# Patient Record
Sex: MALE | Race: WHITE | Employment: OTHER | URBAN - METROPOLITAN AREA
[De-identification: names, ages, dates, MRNs, and addresses within clinical notes are randomized per-mention and may not be internally consistent; named-entity substitution may affect disease eponyms.]

---

## 2017-01-20 DIAGNOSIS — B35.3 TINEA PEDIS OF BOTH FEET: Primary | ICD-10-CM

## 2017-01-20 DIAGNOSIS — B35.6 TINEA CRURIS: ICD-10-CM

## 2017-01-20 RX ORDER — CLOTRIMAZOLE 1 %
CREAM (GRAM) TOPICAL 2 TIMES DAILY
Qty: 60 G | Refills: 0 | Status: SHIPPED | OUTPATIENT
Start: 2017-01-20 | End: 2017-02-14

## 2017-01-20 NOTE — TELEPHONE ENCOUNTER
clotrimazole (LOTRIMIN) 1 % cream   Last Written Prescription Date: 12/26/2016  Last Fill Quantity: 60g,  # refills: 0   Last Office Visit with [facility], [facility] or [facility] prescribing provider: 10/29/2016

## 2017-01-20 NOTE — TELEPHONE ENCOUNTER
Prescription approved per [facility] Refill Protocol  Briseida Palacio RN BS

## 2017-02-06 ENCOUNTER — MYC MEDICAL ADVICE (OUTPATIENT)
Dept: FAMILY MEDICINE | Facility: CLINIC | Age: 52
End: 2017-02-06

## 2017-02-06 DIAGNOSIS — E11.8 CONTROLLED TYPE 2 DIABETES MELLITUS WITH COMPLICATION, WITHOUT LONG-TERM CURRENT USE OF INSULIN (H): ICD-10-CM

## 2017-02-06 DIAGNOSIS — Z11.59 NEED FOR HEPATITIS C SCREENING TEST: Primary | ICD-10-CM

## 2017-02-06 NOTE — TELEPHONE ENCOUNTER
Dr Marcial  Please order hepatitis screening lab that you prefer  Briseida Palacio RN, BS  Clinical Nurse Triage .

## 2017-02-10 DIAGNOSIS — E11.8 CONTROLLED TYPE 2 DIABETES MELLITUS WITH COMPLICATION, WITHOUT LONG-TERM CURRENT USE OF INSULIN (H): ICD-10-CM

## 2017-02-10 DIAGNOSIS — E78.5 HYPERLIPIDEMIA LDL GOAL <100: ICD-10-CM

## 2017-02-10 DIAGNOSIS — Z11.59 NEED FOR HEPATITIS C SCREENING TEST: ICD-10-CM

## 2017-02-10 LAB
HBA1C MFR BLD: 6.2 % (ref 4.3–6)
HCV AB SERPL QL IA: NORMAL

## 2017-02-10 PROCEDURE — 80061 LIPID PANEL: CPT | Performed by: FAMILY MEDICINE

## 2017-02-10 PROCEDURE — 86803 HEPATITIS C AB TEST: CPT | Performed by: FAMILY MEDICINE

## 2017-02-10 PROCEDURE — 36415 COLL VENOUS BLD VENIPUNCTURE: CPT | Performed by: FAMILY MEDICINE

## 2017-02-10 PROCEDURE — 83036 HEMOGLOBIN GLYCOSYLATED A1C: CPT | Performed by: FAMILY MEDICINE

## 2017-02-10 PROCEDURE — 80053 COMPREHEN METABOLIC PANEL: CPT | Performed by: FAMILY MEDICINE

## 2017-02-11 LAB
ALBUMIN SERPL-MCNC: 4.4 G/DL (ref 3.4–5)
ALP SERPL-CCNC: 58 U/L (ref 40–150)
ALT SERPL W P-5'-P-CCNC: 39 U/L (ref 0–70)
ANION GAP SERPL CALCULATED.3IONS-SCNC: 7 MMOL/L (ref 3–14)
AST SERPL W P-5'-P-CCNC: 25 U/L (ref 0–45)
BILIRUB SERPL-MCNC: 0.6 MG/DL (ref 0.2–1.3)
BUN SERPL-MCNC: 17 MG/DL (ref 7–30)
CALCIUM SERPL-MCNC: 9.1 MG/DL (ref 8.5–10.1)
CHLORIDE SERPL-SCNC: 104 MMOL/L (ref 94–109)
CHOLEST SERPL-MCNC: 178 MG/DL
CO2 SERPL-SCNC: 27 MMOL/L (ref 20–32)
CREAT SERPL-MCNC: 1.1 MG/DL (ref 0.66–1.25)
GFR SERPL CREATININE-BSD FRML MDRD: 70 ML/MIN/1.7M2
GLUCOSE SERPL-MCNC: 106 MG/DL (ref 70–99)
HDLC SERPL-MCNC: 35 MG/DL
LDLC SERPL CALC-MCNC: 119 MG/DL
NONHDLC SERPL-MCNC: 143 MG/DL
POTASSIUM SERPL-SCNC: 4.1 MMOL/L (ref 3.4–5.3)
PROT SERPL-MCNC: 7.3 G/DL (ref 6.8–8.8)
SODIUM SERPL-SCNC: 138 MMOL/L (ref 133–144)
TRIGL SERPL-MCNC: 122 MG/DL

## 2017-02-13 ENCOUNTER — TRANSFERRED RECORDS (OUTPATIENT)
Dept: HEALTH INFORMATION MANAGEMENT | Facility: CLINIC | Age: 52
End: 2017-02-13

## 2017-02-14 DIAGNOSIS — B35.6 TINEA CRURIS: ICD-10-CM

## 2017-02-14 DIAGNOSIS — B35.3 TINEA PEDIS OF BOTH FEET: ICD-10-CM

## 2017-02-14 RX ORDER — CLOTRIMAZOLE 1 %
CREAM (GRAM) TOPICAL 2 TIMES DAILY
Qty: 60 G | Refills: 1 | Status: SHIPPED | OUTPATIENT
Start: 2017-02-14 | End: 2017-04-06

## 2017-02-14 NOTE — TELEPHONE ENCOUNTER
Prescription approved per [facility] Refill Protocol.  Yissel Mcdonald RN

## 2017-02-14 NOTE — TELEPHONE ENCOUNTER
clotrimazole (LOTRIMIN) 1 % cream   Last Written Prescription Date: 01/20/17  Last Fill Quantity: 60g,  # refills: 0   Last Office Visit with [facility], [facility] or [facility] prescribing provider: 10/29/16

## 2017-02-24 DIAGNOSIS — B35.6 TINEA CRURIS: ICD-10-CM

## 2017-02-24 RX ORDER — NYSTATIN AND TRIAMCINOLONE ACETONIDE 100000; 1 [USP'U]/G; MG/G
CREAM TOPICAL 2 TIMES DAILY
Qty: 60 G | Refills: 1 | Status: SHIPPED | OUTPATIENT
Start: 2017-02-24 | End: 2017-04-12

## 2017-02-24 NOTE — TELEPHONE ENCOUNTER
nystatin-triamcinolone (MYCOLOG II) cream      Last Written Prescription Date: 12/30/16  Last Fill Quantity: 60g,  # refills: 1   Last Office Visit with [facility], [facility] or [facility] prescribing provider: 10/29/2016                                               LOLA Mathew

## 2017-02-24 NOTE — TELEPHONE ENCOUNTER
Prescription approved per [facility] Refill Protocol.  Yissel Mcdonald RN

## 2017-03-06 DIAGNOSIS — E78.5 HYPERLIPIDEMIA LDL GOAL <100: ICD-10-CM

## 2017-03-07 RX ORDER — FENOFIBRATE 160 MG/1
160 TABLET ORAL DAILY
Qty: 90 TABLET | Refills: 1 | Status: SHIPPED | OUTPATIENT
Start: 2017-03-07 | End: 2017-07-21

## 2017-03-07 NOTE — TELEPHONE ENCOUNTER
Prescription [place] per [facility] [place].  Yissel Mcdonald RN

## 2017-03-07 NOTE — TELEPHONE ENCOUNTER
fenofibrate 160 MG tablet       Last Written Prescription Date: 12/30/16  Last Fill Quantity: 90, # refills: 0    Last Office Visit with [facility], [facility] or [facility] prescribing provider:  10/29/2016     Future Office Visit:      Cholesterol   Date Value Ref Range Status   02/10/2017 178 <200 mg/dL Final     HDL Cholesterol   Date Value Ref Range Status   02/10/2017 35 (L) >39 mg/dL Final     LDL Cholesterol Calculated   Date Value Ref Range Status   02/10/2017 119 (H) <100 mg/dL Final     Comment:     Above desirable:  100-129 mg/dl   Borderline High:  130-159 mg/dL   High:             160-189 mg/dL   Very high:       >189 mg/dl       LDL Cholesterol Direct   Date Value Ref Range Status   09/19/2014 77 0 - 129 mg/dL Final     Comment:     Optimal:         <100 mg/dL   Near Optimal:     100-129 mg/dL   Borderline High:  130-159 mg/dL   High:             160-189 mg/dL   Very high:  greater than or equal to 190 mg/dL   Cannot estimate LDL when triglyceride exceeds 400 mg/dL       Triglycerides   Date Value Ref Range Status   02/10/2017 122 <150 mg/dL Final     Comment:     Fasting specimen     Cholesterol/HDL Ratio   Date Value Ref Range Status   09/10/2015 4.7 0.0 - 5.0 Final     ALT   Date Value Ref Range Status   02/10/2017 39 0 - 70 U/L Final      LOLA Mathew

## 2017-04-06 DIAGNOSIS — B35.3 TINEA PEDIS OF BOTH FEET: ICD-10-CM

## 2017-04-06 DIAGNOSIS — B35.6 TINEA CRURIS: ICD-10-CM

## 2017-04-06 RX ORDER — CLOTRIMAZOLE 1 %
CREAM (GRAM) TOPICAL
Qty: 60 G | Refills: 3 | Status: SHIPPED | OUTPATIENT
Start: 2017-04-06 | End: 2017-08-17

## 2017-04-06 NOTE — TELEPHONE ENCOUNTER
Prescription approved per [facility] Refill Protocol.  Yissel Mcdonald RN

## 2017-04-06 NOTE — TELEPHONE ENCOUNTER
clotrimazole (LOTRIMIN) 1 % cream  Sig: Apply topically 2 times daily      Last Written Prescription Date: 2/14/17  Last Fill Quantity: 60g ,  # refills: 1   Last Office Visit with G, P or [facility] prescribing provider:  10/29/2016                                              Associated Diagnoses   Tinea pedis of both feet [B35.3]       Tinea cruris [B35.6]           LOLA Mathew  April 6, 2017  3:46 PM

## 2017-04-12 DIAGNOSIS — B35.6 TINEA CRURIS: ICD-10-CM

## 2017-04-13 RX ORDER — NYSTATIN AND TRIAMCINOLONE ACETONIDE 100000; 1 [USP'U]/G; MG/G
CREAM TOPICAL
Qty: 60 G | Refills: 2 | Status: SHIPPED | OUTPATIENT
Start: 2017-04-13 | End: 2020-04-04

## 2017-04-13 NOTE — TELEPHONE ENCOUNTER
Nystatin-Triamcinolone Cream      Last Written Prescription Date: 2/24/2017  Last Fill Quantity: 60g,  # refills: 1   Last Office Visit with [facility], [facility] or [facility] prescribing provider: 10/29/2016    Prescription approved per [facility] Refill Protocol.    Sarah Moreno RN, BSN, PHN

## 2017-04-21 ENCOUNTER — MYC REFILL (OUTPATIENT)
Dept: FAMILY MEDICINE | Facility: CLINIC | Age: 52
End: 2017-04-21

## 2017-04-21 DIAGNOSIS — E78.5 HYPERLIPIDEMIA LDL GOAL <100: ICD-10-CM

## 2017-04-22 RX ORDER — ATORVASTATIN CALCIUM 80 MG/1
80 TABLET, FILM COATED ORAL DAILY
Qty: 90 TABLET | Refills: 1 | Status: SHIPPED | OUTPATIENT
Start: 2017-04-22 | End: 2017-08-17

## 2017-04-22 NOTE — TELEPHONE ENCOUNTER
Message from [employer]:  Original authorizing provider: MD Edd Jay would like a refill of the following medications:  atorvastatin (LIPITOR) 80 MG tablet [Janis Marcial MD]    Preferred pharmacy: [facility] - [place] [address]    Comment:

## 2017-04-22 NOTE — TELEPHONE ENCOUNTER
Prescription approved per [facility] [place]ill Protocol. See MyChart reply to patient.   Du Branham RN

## 2017-05-25 DIAGNOSIS — K21.9 GASTROESOPHAGEAL REFLUX DISEASE WITHOUT ESOPHAGITIS: ICD-10-CM

## 2017-05-25 RX ORDER — OMEPRAZOLE 40 MG/1
CAPSULE, DELAYED RELEASE ORAL
Qty: 90 CAPSULE | Refills: 1 | Status: SHIPPED | OUTPATIENT
Start: 2017-05-25 | End: 2017-08-17

## 2017-05-25 NOTE — TELEPHONE ENCOUNTER
omeprazole (PRILOSEC) 40 MG capsule  Last Written Prescription Date: 12/30/16  Last Fill Quantity: 90,  # refills: 1   Last Office Visit with [facility], [facility] or [facility] prescribing provider:  10/29/2016                                              LOLA Mathew  May 25, 2017  8:22 AM

## 2017-05-25 NOTE — TELEPHONE ENCOUNTER
Prescription approved per [facility] Refill Protocol.  Yissel Mcdonald RN

## 2017-05-26 ENCOUNTER — TRANSFERRED RECORDS (OUTPATIENT)
Dept: HEALTH INFORMATION MANAGEMENT | Facility: CLINIC | Age: 52
End: 2017-05-26

## 2017-06-03 DIAGNOSIS — I10 ESSENTIAL HYPERTENSION, BENIGN: ICD-10-CM

## 2017-06-05 NOTE — TELEPHONE ENCOUNTER
Please disregard, RX was sent with a [facility] refill request.    LOLA Mathew  June 5, 2017  11:44 AM

## 2017-06-06 RX ORDER — METOPROLOL SUCCINATE 100 MG/1
TABLET, EXTENDED RELEASE ORAL
Qty: 90 TABLET | Refills: 1 | OUTPATIENT
Start: 2017-06-06

## 2017-06-07 DIAGNOSIS — K57.30 DIVERTICULOSIS OF LARGE INTESTINE: ICD-10-CM

## 2017-06-07 NOTE — TELEPHONE ENCOUNTER
polyethylene glycol (MIRALAX/GLYCOLAX) powder  Last Written Prescription Date: 12/30/16  Last Fill Quantity: 527g  ,  # refills: 3   Last Office Visit with [facility], [facility] or [facility] prescribing provider:  10/29/2016                                              LOLA Mathew  June 7, 2017  3:53 PM

## 2017-06-08 RX ORDER — POLYETHYLENE GLYCOL 3350 17 G/17G
POWDER, FOR SOLUTION ORAL
Qty: 1054 G | Refills: 3 | Status: SHIPPED | OUTPATIENT
Start: 2017-06-08 | End: 2018-03-05

## 2017-06-08 NOTE — TELEPHONE ENCOUNTER
Prescription approved per [facility] Refill Protocol.  Yissel Mcdonald RN

## 2017-07-21 DIAGNOSIS — E78.5 HYPERLIPIDEMIA LDL GOAL <100: ICD-10-CM

## 2017-07-21 DIAGNOSIS — E11.9 TYPE 2 DIABETES MELLITUS WITHOUT COMPLICATION (H): ICD-10-CM

## 2017-07-21 NOTE — TELEPHONE ENCOUNTER
Request is from mail pharmacy    fenofibrate 160 MG tablet       Last Written Prescription Date: 3/7/17  Last Fill Quantity: 90, # refills: 1    Last Office Visit with [facility], [facility] or [facility] prescribing provider:  10/29/2016     Future Office Visit:      Cholesterol   Date Value Ref Range Status   02/10/2017 178 <200 mg/dL Final     HDL Cholesterol   Date Value Ref Range Status   02/10/2017 35 (L) >39 mg/dL Final     LDL Cholesterol Calculated   Date Value Ref Range Status   02/10/2017 119 (H) <100 mg/dL Final     Comment:     Above desirable:  100-129 mg/dl   Borderline High:  130-159 mg/dL   High:             160-189 mg/dL   Very high:       >189 mg/dl       Triglycerides   Date Value Ref Range Status   02/10/2017 122 <150 mg/dL Final     Comment:     Fasting specimen     Cholesterol/HDL Ratio   Date Value Ref Range Status   09/10/2015 4.7 0.0 - 5.0 Final     ALT   Date Value Ref Range Status   02/10/2017 39 0 - 70 U/L Final      LOLA Mathew  July 21, 2017  11:52 AM

## 2017-07-21 NOTE — TELEPHONE ENCOUNTER
ONE TOUCH ULTRA test strip  Last Written Prescription Date: 5/16/16  Last Fill Quantity: 1 box,  # refills: 11   Last Office Visit with G, P or [facility] prescribing provider:  10/29/2016                                              LOLA Mathew  July 21, 2017  2:30 PM

## 2017-07-21 NOTE — LETTER
[facility]  [address], [address]  [place] [address]  Phone: [phone]  Fax: [phone]    07/25/17    Edd Fong  [address]  [place] [address]      Dear Kim:     We recently received a call from your pharmacy requesting a refill of your medication.    A review of your chart indicates that an appointment is required with your provider for office visit and labs. Please call the clinic at [phone] to schedule your appointment.    We have authorized one refill of your medication to allow time for you to schedule your appointment.    Taking care of your health is important to us, and ongoing visits with your provider are vital to your care.  We look forward to seeing you in the near future.          Sincerely,      Janis Marcial MD/Briseida VILLATORO RN

## 2017-07-25 ENCOUNTER — MYC REFILL (OUTPATIENT)
Dept: FAMILY MEDICINE | Facility: CLINIC | Age: 52
End: 2017-07-25

## 2017-07-25 DIAGNOSIS — E78.5 HYPERLIPIDEMIA LDL GOAL <100: ICD-10-CM

## 2017-07-25 DIAGNOSIS — I10 ESSENTIAL HYPERTENSION, BENIGN: ICD-10-CM

## 2017-07-25 RX ORDER — FENOFIBRATE 160 MG/1
160 TABLET ORAL DAILY
Qty: 90 TABLET | Refills: 1 | Status: CANCELLED | OUTPATIENT
Start: 2017-07-25

## 2017-07-25 RX ORDER — METOPROLOL SUCCINATE 100 MG/1
100 TABLET, EXTENDED RELEASE ORAL DAILY
Qty: 90 TABLET | Refills: 1 | Status: CANCELLED | OUTPATIENT
Start: 2017-07-25

## 2017-07-25 RX ORDER — FENOFIBRATE 160 MG/1
160 TABLET ORAL DAILY
Qty: 90 TABLET | Refills: 0 | Status: SHIPPED | OUTPATIENT
Start: 2017-07-25 | End: 2017-08-17

## 2017-07-25 NOTE — TELEPHONE ENCOUNTER
Prescription approved per [facility] Refill Protocol  Briseida Palacio RN BS

## 2017-07-25 NOTE — TELEPHONE ENCOUNTER
Message from [employer]:  Original authorizing provider: MD Edd Jay would like a refill of the following medications:  fenofibrate 160 MG tablet [Janis Marcial MD]  metoprolol (TOPROL-XL) 100 MG 24 hr tablet [Janis Marcial MD]    Preferred pharmacy: [facility] - [facility] [address]    Comment:

## 2017-07-25 NOTE — TELEPHONE ENCOUNTER
Medication is being filled for 1 time refill only due to:  due for labsw and visit, letter sent to make appt     Briseida Palacio RN, BS  Clinical Nurse Triage.

## 2017-08-02 ENCOUNTER — DOCUMENTATION ONLY (OUTPATIENT)
Dept: LAB | Facility: CLINIC | Age: 52
End: 2017-08-02

## 2017-08-02 DIAGNOSIS — E11.8 CONTROLLED TYPE 2 DIABETES MELLITUS WITH COMPLICATION, WITHOUT LONG-TERM CURRENT USE OF INSULIN (H): ICD-10-CM

## 2017-08-02 DIAGNOSIS — E11.8 CONTROLLED TYPE 2 DIABETES MELLITUS WITH COMPLICATION, WITHOUT LONG-TERM CURRENT USE OF INSULIN (H): Primary | ICD-10-CM

## 2017-08-02 PROCEDURE — 80061 LIPID PANEL: CPT | Performed by: FAMILY MEDICINE

## 2017-08-02 PROCEDURE — 83036 HEMOGLOBIN GLYCOSYLATED A1C: CPT | Performed by: FAMILY MEDICINE

## 2017-08-02 PROCEDURE — 36415 COLL VENOUS BLD VENIPUNCTURE: CPT | Performed by: FAMILY MEDICINE

## 2017-08-03 LAB
CHOLEST SERPL-MCNC: 178 MG/DL
HBA1C MFR BLD: 6.3 % (ref 4.3–6)
HDLC SERPL-MCNC: 33 MG/DL
LDLC SERPL CALC-MCNC: 105 MG/DL
NONHDLC SERPL-MCNC: 145 MG/DL
TRIGL SERPL-MCNC: 199 MG/DL

## 2017-08-05 ENCOUNTER — MYC MEDICAL ADVICE (OUTPATIENT)
Dept: FAMILY MEDICINE | Facility: CLINIC | Age: 52
End: 2017-08-05

## 2017-08-17 ENCOUNTER — OFFICE VISIT (OUTPATIENT)
Dept: FAMILY MEDICINE | Facility: CLINIC | Age: 52
End: 2017-08-17
Payer: COMMERCIAL

## 2017-08-17 VITALS
OXYGEN SATURATION: 98 % | BODY MASS INDEX: 28.85 KG/M2 | HEART RATE: 52 BPM | DIASTOLIC BLOOD PRESSURE: 86 MMHG | HEIGHT: 76 IN | RESPIRATION RATE: 16 BRPM | TEMPERATURE: 98 F | WEIGHT: 236.9 LBS | SYSTOLIC BLOOD PRESSURE: 156 MMHG

## 2017-08-17 DIAGNOSIS — E78.5 HYPERLIPIDEMIA LDL GOAL <100: ICD-10-CM

## 2017-08-17 DIAGNOSIS — Z00.00 ROUTINE GENERAL MEDICAL EXAMINATION AT A HEALTH CARE FACILITY: Primary | ICD-10-CM

## 2017-08-17 DIAGNOSIS — Z13.89 SCREENING FOR DIABETIC PERIPHERAL NEUROPATHY: ICD-10-CM

## 2017-08-17 DIAGNOSIS — I10 ESSENTIAL HYPERTENSION, BENIGN: ICD-10-CM

## 2017-08-17 DIAGNOSIS — E11.8 CONTROLLED TYPE 2 DIABETES MELLITUS WITH COMPLICATION, WITHOUT LONG-TERM CURRENT USE OF INSULIN (H): ICD-10-CM

## 2017-08-17 DIAGNOSIS — K21.9 GASTROESOPHAGEAL REFLUX DISEASE WITHOUT ESOPHAGITIS: ICD-10-CM

## 2017-08-17 DIAGNOSIS — F33.42 MAJOR DEPRESSIVE DISORDER, RECURRENT EPISODE, IN FULL REMISSION (H): ICD-10-CM

## 2017-08-17 DIAGNOSIS — B35.3 TINEA PEDIS OF BOTH FEET: ICD-10-CM

## 2017-08-17 LAB
ABO + RH BLD: NORMAL
ABO + RH BLD: NORMAL
ERYTHROCYTE [DISTWIDTH] IN BLOOD BY AUTOMATED COUNT: 12.7 % (ref 10–15)
HCT VFR BLD AUTO: 39 % (ref 40–53)
HGB BLD-MCNC: 13.1 G/DL (ref 13.3–17.7)
MCH RBC QN AUTO: 30.7 PG (ref 26.5–33)
MCHC RBC AUTO-ENTMCNC: 33.6 G/DL (ref 31.5–36.5)
MCV RBC AUTO: 91 FL (ref 78–100)
PLATELET # BLD AUTO: 198 10E9/L (ref 150–450)
RBC # BLD AUTO: 4.27 10E12/L (ref 4.4–5.9)
SPECIMEN EXP DATE BLD: NORMAL
WBC # BLD AUTO: 4.6 10E9/L (ref 4–11)

## 2017-08-17 PROCEDURE — 36415 COLL VENOUS BLD VENIPUNCTURE: CPT | Performed by: FAMILY MEDICINE

## 2017-08-17 PROCEDURE — 86901 BLOOD TYPING SEROLOGIC RH(D): CPT | Performed by: FAMILY MEDICINE

## 2017-08-17 PROCEDURE — 99396 PREV VISIT EST AGE 40-64: CPT | Performed by: FAMILY MEDICINE

## 2017-08-17 PROCEDURE — 84443 ASSAY THYROID STIM HORMONE: CPT | Performed by: FAMILY MEDICINE

## 2017-08-17 PROCEDURE — 86900 BLOOD TYPING SEROLOGIC ABO: CPT | Performed by: FAMILY MEDICINE

## 2017-08-17 PROCEDURE — 82043 UR ALBUMIN QUANTITATIVE: CPT | Performed by: FAMILY MEDICINE

## 2017-08-17 PROCEDURE — 99213 OFFICE O/P EST LOW 20 MIN: CPT | Mod: 25 | Performed by: FAMILY MEDICINE

## 2017-08-17 PROCEDURE — 99207 C FOOT EXAM  NO CHARGE: CPT | Mod: 25 | Performed by: FAMILY MEDICINE

## 2017-08-17 PROCEDURE — 80053 COMPREHEN METABOLIC PANEL: CPT | Performed by: FAMILY MEDICINE

## 2017-08-17 PROCEDURE — 85027 COMPLETE CBC AUTOMATED: CPT | Performed by: FAMILY MEDICINE

## 2017-08-17 RX ORDER — PAROXETINE 20 MG/1
20 TABLET, FILM COATED ORAL AT BEDTIME
Qty: 90 TABLET | Refills: 1 | Status: SHIPPED | OUTPATIENT
Start: 2017-08-17 | End: 2018-03-05

## 2017-08-17 RX ORDER — CLOTRIMAZOLE 1 %
CREAM (GRAM) TOPICAL
Qty: 60 G | Refills: 3 | Status: SHIPPED | OUTPATIENT
Start: 2017-08-17 | End: 2018-04-28

## 2017-08-17 RX ORDER — OMEPRAZOLE 40 MG/1
CAPSULE, DELAYED RELEASE ORAL
Qty: 90 CAPSULE | Refills: 1 | Status: SHIPPED | OUTPATIENT
Start: 2017-08-17 | End: 2018-07-17

## 2017-08-17 RX ORDER — METOPROLOL SUCCINATE 100 MG/1
100 TABLET, EXTENDED RELEASE ORAL DAILY
Qty: 90 TABLET | Refills: 1 | Status: SHIPPED | OUTPATIENT
Start: 2017-08-17 | End: 2018-03-05

## 2017-08-17 RX ORDER — LISINOPRIL 5 MG/1
5 TABLET ORAL DAILY
Qty: 90 TABLET | Refills: 1 | Status: SHIPPED | OUTPATIENT
Start: 2017-08-17 | End: 2017-12-11 | Stop reason: DRUGHIGH

## 2017-08-17 RX ORDER — FENOFIBRATE 160 MG/1
160 TABLET ORAL DAILY
Qty: 90 TABLET | Refills: 1 | Status: SHIPPED | OUTPATIENT
Start: 2017-08-17 | End: 2018-03-05

## 2017-08-17 RX ORDER — ATORVASTATIN CALCIUM 80 MG/1
80 TABLET, FILM COATED ORAL DAILY
Qty: 90 TABLET | Refills: 1 | Status: SHIPPED | OUTPATIENT
Start: 2017-08-17 | End: 2018-07-18

## 2017-08-17 ASSESSMENT — ANXIETY QUESTIONNAIRES
7. FEELING AFRAID AS IF SOMETHING AWFUL MIGHT HAPPEN: NOT AT ALL
2. NOT BEING ABLE TO STOP OR CONTROL WORRYING: NOT AT ALL
5. BEING SO RESTLESS THAT IT IS HARD TO SIT STILL: NOT AT ALL
GAD7 TOTAL SCORE: 0
3. WORRYING TOO MUCH ABOUT DIFFERENT THINGS: NOT AT ALL
6. BECOMING EASILY ANNOYED OR IRRITABLE: NOT AT ALL
1. FEELING NERVOUS, ANXIOUS, OR ON EDGE: NOT AT ALL

## 2017-08-17 ASSESSMENT — PAIN SCALES - GENERAL: PAINLEVEL: NO PAIN (0)

## 2017-08-17 ASSESSMENT — PATIENT HEALTH QUESTIONNAIRE - PHQ9
5. POOR APPETITE OR OVEREATING: NOT AT ALL
SUM OF ALL RESPONSES TO PHQ QUESTIONS 1-9: 0

## 2017-08-17 NOTE — PATIENT INSTRUCTIONS
Preventive Health Recommendations  Male Ages 50   64    Yearly exam:             See your health care provider every year in order to  o   Review health changes.   o   Discuss preventive care.    o   Review your medicines if your doctor has prescribed any.     Have a cholesterol test every 5 years, or more frequently if you are at risk for high cholesterol/heart disease.     Have a diabetes test (fasting glucose) every three years. If you are at risk for diabetes, you should have this test more often.     Have a colonoscopy at age 50, or have a yearly FIT test (stool test). These exams will check for colon cancer.      Talk with your health care provider about whether or not a prostate cancer screening test (PSA) is right for you.    You should be tested each year for STDs (sexually transmitted diseases), if you re at risk.     Shots: Get a flu shot each year. Get a tetanus shot every 10 years.     Nutrition:    Eat at least 5 servings of fruits and vegetables daily.     Eat whole-grain bread, whole-wheat pasta and brown rice instead of white grains and rice.     Talk to your provider about Calcium and Vitamin D.     Lifestyle    Exercise for at least 150 minutes a week (30 minutes a day, 5 days a week). This will help you control your weight and prevent disease.     Limit alcohol to one drink per day.     No smoking.     Wear sunscreen to prevent skin cancer.     See your dentist every six months for an exam and cleaning.     See your eye doctor every 1 to 2 years.      A handful or two of raw unsalted almonds, walnuts, etc.    Eating plant fats such as avocados  Fish once a week  3 servings of fruits a day  2 servings of vegetables a day  More beans such as garbanzo beans   Less meat and dairy- recommend skim milk. A low sugar yogurt a day is fine

## 2017-08-17 NOTE — PROGRESS NOTES
SUBJECTIVE:   CC: Edd Fong is an 52 year old male who presents for preventative health visit.     Physical   Annual:     Getting at least 3 servings of Calcium per day::  Yes    Bi-annual eye exam::  Yes    Dental care twice a year::  Yes    Sleep apnea or symptoms of sleep apnea::  None    Diet::  Low salt, Low fat/cholesterol and Diabetic    Frequency of exercise::  4-5 days/week    Duration of exercise::  45-60 minutes    Taking medications regularly::  Yes    Medication side effects::  None    Additional concerns today::  No    Diabetes Follow-up    Patient is checking blood sugars: once daily.  Results are as follows:       am - 120        Diabetic concerns: None     Symptoms of hypoglycemia (low blood sugar): none     Paresthesias (numbness or burning in feet) or sores: No     Date of last diabetic eye exam: 5/26/2017, rosemount and normal    Patient's recent A1c was 6.3. He checks his blood glucose levels at home and states that they have been fine. His blood glucose levels this morning while fasting was 120 mg/dL. He had a diabetic eye exam 5/26/2017.      Hypertension Follow-up      [facility].  Results are 122/62.    Low Salt Diet: no added salt    Patient [facility] [place]. Readings average 122/62. He states that he hardly ever gets any high readings.     He has been exercising 3-4 times a week [facility]. He has been going here for the past 4 years. He will normally do 30 minutes on the elliptical, 15 minutes on the treadmill and will also do weight training and free weights. No chest pains, no concerns    Patient reports that the insomnia, constipation and energy are fine and controlled.     He thought he felt something pull in the lower right back region. Pain has gone away and he is not currently affected by it.      Today's PHQ-2 Score: PHQ-2 ( 1999 Pfizer) 8/12/2017   Q1:  Little interest or pleasure in doing things 0   Q2: Feeling down, depressed or hopeless 0   PHQ-2 Score 0   Q1: Little interest or pleasure in doing things Not at all   Q2: Feeling down, depressed or hopeless Not at all   PHQ-2 Score 0     Abuse: Current or Past(Physical, Sexual or Emotional)- NO  Do you feel safe in your environment - YES    Social History   Substance Use Topics     Smoking status: Never Smoker     Smokeless tobacco: Never Used     Alcohol use 0.0 oz/week      Comment: 1 beer a day     The patient does not drink >3 drinks per day nor >7 drinks per week.    Last PSA:   PSA   Date Value Ref Range Status   02/08/2011 0.38 0 - 4 ug/L Final     Reviewed orders with patient. Reviewed health maintenance and updated orders accordingly - Yes  Labs reviewed in EPIC  BP Readings from Last 3 Encounters:   08/17/17 150/80   10/29/16 138/72   08/29/16 126/72    Wt Readings from Last 3 Encounters:   08/17/17 107.5 kg (236 lb 14.4 oz)   10/29/16 108.9 kg (240 lb)   08/29/16 109.5 kg (241 lb 8 oz)          Recent Labs   Lab Test  08/02/17   0830  02/10/17   0839  08/17/16   0849  03/15/16   0931   09/28/15   1157   02/13/15   1053   A1C  6.3*  6.2*  7.2*   --    < >   --    < >  7.0*   LDL  105*  119*  117*  109*   --    --    < >   --    HDL  33*  35*  32*  33*   --    --    < >   --    TRIG  199*  122  147  195*   --    --    < >   --    ALT   --   39   --   34   --   37   --    --    CR   --   1.10   --   1.17   --   1.20   --   1.11   GFRESTIMATED   --   70   --   65   --   64   --   70   GFRESTBLACK   --   85   --   79   --   77   --   84   POTASSIUM   --   4.1   --   4.4   --   4.2   --   4.2   TSH   --    --    --   2.99   --    --    --   2.42    < > = values in this interval not displayed.      Reviewed and updated as needed this visit by clinical staffTobacco  Allergies  Meds  Problems  Med Hx  Surg Hx  Fam Hx  Soc Hx          Reviewed and updated as needed this visit by Provider       "      ROS:  C: NEGATIVE for fever, chills, change in weight  I: NEGATIVE for worrisome rashes, moles or lesions  E: NEGATIVE for vision changes or irritation  ENT: NEGATIVE for ear, mouth and throat problems  R: NEGATIVE for significant cough or SOB  CV: NEGATIVE for chest pain, palpitations or peripheral edema  GI: NEGATIVE for nausea, abdominal pain, heartburn, or change in bowel habits   male: negative for dysuria, hematuria, decreased urinary stream, erectile dysfunction, urethral discharge  M: NEGATIVE for significant arthralgias or myalgia  N: NEGATIVE for weakness, dizziness or paresthesias  P: NEGATIVE for changes in mood or affect    This document serves as a record of the services and decisions personally performed and made by Jansi Marcial MD. It was created on her behalf by Mireya Banks, a trained medical scribe. The creation of this document is based on the provider's statements to the medical scribe.  Mireya Banks August 17, 2017 10:01 AM       OBJECTIVE:   /80 (BP Location: Right arm, Patient Position: Chair, Cuff Size: Adult Regular)  Pulse 52  Temp 98  F (36.7  C) (Oral)  Resp 16  Ht 6' 4\" (1.93 m)  Wt 236 lb 14.4 oz (107.5 kg)  SpO2 98%  BMI 28.84 kg/m2    EXAM:  GENERAL: healthy, alert and no distress  EYES: Eyes grossly normal to inspection, PERRL and conjunctivae and sclerae normal  HENT: ear canals and TM's normal, nose and mouth without ulcers or lesions  NECK: no adenopathy, no asymmetry, masses, or scars and thyroid normal to palpation  RESP: lungs clear to auscultation - no rales, rhonchi or wheezes  CV: regular rate and rhythm, normal S1 S2, no S3 or S4, no murmur, click or rub, no peripheral edema and peripheral pulses strong  ABDOMEN: soft, nontender, no hepatosplenomegaly, no masses and bowel sounds normal  MS: no gross musculoskeletal defects noted, no edema  SKIN: no suspicious lesions or rashes. Slight athletes foot noted on the bottoms and sides of both feet, " mild   NEURO: Normal strength and tone, mentation intact and speech normal  PSYCH: mentation appears normal, affect normal/bright  Diabetic foot exam: normal DP and PT pulses, no trophic changes or ulcerative lesions and normal sensory examnormal DP and PT pulses, no trophic changes or ulcerative lesions and normal sensory exam    ASSESSMENT/PLAN:   (Z00.00) Routine general medical examination at a health care facility  (primary encounter diagnosis)  Comment: Patient is overall doing well. Normal exam, other then overweight-but stable    (E11.8) Controlled type 2 diabetes mellitus with complication, without long-term current use of insulin (H)  Comment: Patient's last A1c was within normal limits. Will preform labs today. I recommended improving diet and wrote down the recommendations for the patient to take home. Will recheck A1c in 6 months.   Plan: Albumin Random Urine Quantitative, TSH with         free T4 reflex, CBC with platelets, blood         glucose monitoring (ONE TOUCH ULTRA) test         strip, metFORMIN (GLUCOPHAGE) 500 MG tablet,         blood glucose monitoring (ONE TOUCH ULTRA) test        strip, DISCONTINUED: blood glucose monitoring         (ONE TOUCH ULTRA) test strip, CANCELED:         Comprehensive metabolic panel          (Z13.89) Screening for diabetic peripheral neuropathy  Comment: Normal.   Plan: FOOT EXAM  NO CHARGE [63584.114]          (E78.5) Hyperlipidemia LDL goal <100  Comment: LDL slightly above normal limits. I recommended improving diet and decreasing meat and dairy consumption. Continue current meds. No side effects to meds  Plan: fenofibrate 160 MG tablet, atorvastatin         (LIPITOR) 80 MG tablet          (I10) Essential hypertension, benign  Comment: Patient's blood pressure was slightly above normal limits today. Continue current meds. Will recheck blood pressure in 6 months.  Recheck was normal  Plan: metoprolol (TOPROL-XL) 100 MG 24 hr tablet,         lisinopril  "(PRINIVIL/ZESTRIL) 5 MG tablet          (K21.9) Gastroesophageal reflux disease without esophagitis  Comment: Controlled. Continue current meds.   Plan: omeprazole (PRILOSEC) 40 MG capsule          (B35.3) Tinea pedis of both feet  Comment: I advised him to make sure he uses the cream daily and that he spread the cream all over the foot and especially the back of the heel.   Plan: clotrimazole (LOTRIMIN) 1 % cream             (F33.42) Major depressive disorder, recurrent episode, in full remission (H)  Comment: Stable. Continue current meds.   Plan: PARoxetine (PAXIL) 20 MG tablet          COUNSELING:   Reviewed preventive health counseling, as reflected in patient instructions       Regular exercise       Healthy diet/nutrition     reports that he has never smoked. He has never used smokeless tobacco.  Estimated body mass index is 28.84 kg/(m^2) as calculated from the following:    Height as of this encounter: 6' 4\" (1.93 m).    Weight as of this encounter: 236 lb 14.4 oz (107.5 kg).   Weight management plan: Discussed healthy diet and exercise guidelines and patient will follow up in 12 months in clinic to re-evaluate.    Counseling Resources:  ATP IV Guidelines  Pooled Cohorts Equation Calculator  FRAX Risk Assessment  ICSI Preventive Guidelines  Dietary Guidelines for Americans, 2010  USDA's MyPlate  ASA Prophylaxis  Lung CA Screening    The information in this document, created by the medical scribe for me, accurately reflects the services I personally performed and the decisions made by me. I have reviewed and approved this document for accuracy prior to leaving the patient care area.  August 17, 2017 10:01 AM    Janis Marcial MD  [facility]  "

## 2017-08-17 NOTE — PROGRESS NOTES
"  SUBJECTIVE:   CC: Edd Fong is an 52 year old male who presents for preventative health visit.     Healthy Habits:    Do you get at least three servings of calcium containing foods daily (dairy, green leafy vegetables, etc.)? {YES/NO, DAIRY INTAKE:359413::\"yes\"}    Amount of exercise or daily activities, outside of work: {AMOUNT EXERCISE:119279}    Problems taking medications regularly {Yes /No default:344698::\"No\"}    Medication side effects: {Yes /No default.:494600::\"No\"}    Have you had an eye exam in the past two years? {YESNOBLANK:450761}    Do you see a dentist twice per year? {YESNOBLANK:907806}    Do you have sleep apnea, excessive snoring or daytime drowsiness?{YESNOBLANK:445556}    {Outside tests to abstract? :877142}    {additional problems to add (Optional):294180}    Today's PHQ-2 Score:   PHQ-2 ( 1999 Pfizer) 8/12/2017 8/29/2016   Q1: Little interest or pleasure in doing things 0 0   Q2: Feeling down, depressed or hopeless 0 0   PHQ-2 Score 0 0   Q1: Little interest or pleasure in doing things Not at all -   Q2: Feeling down, depressed or hopeless Not at all -   PHQ-2 Score 0 -     {PHQ-2 LOOK IN ASSESSMENTS :179567}  Abuse: Current or Past(Physical, Sexual or Emotional)- {YES/NO/NA:092282}  Do you feel safe in your environment - {YES/NO/NA:971407}    Social History   Substance Use Topics     Smoking status: Never Smoker     Smokeless tobacco: Never Used     Alcohol use 0.0 oz/week      Comment: 1 beer a day     {ETOH AUDIT:257008}    Last PSA:   PSA   Date Value Ref Range Status   02/08/2011 0.38 0 - 4 ug/L Final       Reviewed orders with patient. Reviewed health maintenance and updated orders accordingly - {Yes/No:406640::\"Yes\"}  {Chronicprobdata (Optional):668102}    Reviewed and updated as needed this visit by clinical staff  Tobacco  Allergies  Meds  Problems  Med Hx  Surg Hx  Fam Hx  Soc Hx          Reviewed and updated as needed this visit by Provider        {HISTORY OPTIONS " "(Optional):411413}    ROS:  {MALE ROS-adult preventive care package:270907::\"C: NEGATIVE for fever, chills, change in weight\",\"I: NEGATIVE for worrisome rashes, moles or lesions\",\"E: NEGATIVE for vision changes or irritation\",\"ENT: NEGATIVE for ear, mouth and throat problems\",\"R: NEGATIVE for significant cough or SOB\",\"CV: NEGATIVE for chest pain, palpitations or peripheral edema\",\"GI: NEGATIVE for nausea, abdominal pain, heartburn, or change in bowel habits\",\" male: negative for dysuria, hematuria, decreased urinary stream, erectile dysfunction, urethral discharge\",\"M: NEGATIVE for significant arthralgias or myalgia\",\"N: NEGATIVE for weakness, dizziness or paresthesias\",\"P: NEGATIVE for changes in mood or affect\"}    OBJECTIVE:   There were no vitals taken for this visit.  EXAM:  {Exam Choices:206728}    ASSESSMENT/PLAN:   {Diag Picklist:511263}    COUNSELING:  {MALE COUNSELING MESSAGES:597664::\"Reviewed preventive health counseling, as reflected in patient instructions\"}    {BP Counseling- Complete if BP >= 120/80  (Optional):201218}     reports that he has never smoked. He has never used smokeless tobacco.  {Tobacco Cessation -- Complete if patient is a smoker (Optional):257387}  Estimated body mass index is 29.6 kg/(m^2) as calculated from the following:    Height as of 10/29/16: 6' 3.5\" (1.918 m).    Weight as of 10/29/16: 240 lb (108.9 kg).   {Weight Management Plan (ACO) Complete if BMI is abnormal-  Ages 18-64  BMI >24.9.  Age 65+ with BMI <23 or >30 (Optional):083064}    Counseling Resources:  ATP IV Guidelines  Pooled Cohorts Equation Calculator  FRAX Risk Assessment  ICSI Preventive Guidelines  Dietary Guidelines for Americans, 2010  USDA's MyPlate  ASA Prophylaxis  Lung CA Screening    Janis Marcial MD  [facility]  "

## 2017-08-17 NOTE — MR AVS SNAPSHOT
After Visit Summary   8/17/2017    Edd Fong    MRN: [id number]           Patient Information     Date Of Birth          1965        Visit Information        Provider Department      8/17/2017 9:40 AM Janis Marcial MD [facility]        Today's Diagnoses     Routine general medical examination at a health care facility    -  1    Controlled type 2 diabetes mellitus with complication, without long-term current use of insulin (H)        Screening for diabetic peripheral neuropathy        Hyperlipidemia LDL goal <100        Essential hypertension, benign        Gastroesophageal reflux disease without esophagitis        Tinea pedis of both feet        Tinea cruris        Major depressive disorder, recurrent episode, in full remission (H)          Care Instructions      Preventive Health Recommendations  Male Ages 50 - 64    Yearly exam:             See your health care provider every year in order to  o   Review health changes.   o   Discuss preventive care.    o   Review your medicines if your doctor has prescribed any.     Have a cholesterol test every 5 years, or more frequently if you are at risk for high cholesterol/heart disease.     Have a diabetes test (fasting glucose) every three years. If you are at risk for diabetes, you should have this test more often.     Have a colonoscopy at age 50, or have a yearly FIT test (stool test). These exams will check for colon cancer.      Talk with your health care provider about whether or not a prostate cancer screening test (PSA) is right for you.    You should be tested each year for STDs (sexually transmitted diseases), if you re at risk.     Shots: Get a flu shot each year. Get a tetanus shot every 10 years.     Nutrition:    Eat at least 5 servings of fruits and vegetables daily.     Eat whole-grain bread, whole-wheat pasta and brown rice instead of white grains and rice.     Talk to your provider about Calcium and  Vitamin D.     Lifestyle    Exercise for at least 150 minutes a week (30 minutes a day, 5 days a week). This will help you control your weight and prevent disease.     Limit alcohol to one drink per day.     No smoking.     Wear sunscreen to prevent skin cancer.     See your dentist every six months for an exam and cleaning.     See your eye doctor every 1 to 2 years.      A handful or two of raw unsalted almonds, walnuts, etc.    Eating plant fats such as avocados  Fish once a week  3 servings of fruits a day  2 servings of vegetables a day  More beans such as garbanzo beans   Less meat and dairy- recommend skim milk. A low sugar yogurt a day is fine          Follow-ups after your visit        Follow-up notes from your care team     Return in about 6 months (around 2/17/2018).      Who to contact     If you have questions or need follow up information about today's clinic visit or your schedule please contact [facility] directly at [phone].  Normal or non-critical lab and imaging results will be communicated to you by Spokehart, letter or phone within 4 business days after the clinic has received the results. If you do not hear from us within 7 days, please contact the clinic through Bensata or phone. If you have a critical or abnormal lab result, we will notify you by phone as soon as possible.  Submit refill requests through Bensata or call your pharmacy and they will forward the refill request to us. Please allow 3 business days for your refill to be completed.          Additional Information About Your Visit        Bensata Information     Bensata gives you secure access to your electronic health record. If you see a primary care provider, you can also send messages to your care team and make appointments. If you have questions, please call your primary care clinic.  If you do not have a primary care provider, please call [phone] and they will assist you.        Care EveryWhere ID      "This is your Care EveryWhere ID. This could be used by other organizations to access your [place] medical records  [id number]        Your Vitals Were     Pulse Temperature Respirations Height Pulse Oximetry BMI (Body Mass Index)    52 98  F (36.7  C) (Oral) 16 6' 4\" (1.93 m) 98% 28.84 kg/m2       Blood Pressure from Last 3 Encounters:   08/17/17 150/80   10/29/16 138/72   08/29/16 126/72    Weight from Last 3 Encounters:   08/17/17 236 lb 14.4 oz (107.5 kg)   10/29/16 240 lb (108.9 kg)   08/29/16 241 lb 8 oz (109.5 kg)              We Performed the Following     **Comprehensive metabolic panel FUTURE 1yr     ABO and Rh     Albumin Random Urine Quantitative     CBC with platelets     Comprehensive metabolic panel     FOOT EXAM  NO CHARGE [[id number]]     TSH with free T4 reflex          Today's Medication Changes          These changes are accurate as of: 8/17/17 10:27 AM.  If you have any questions, ask your nurse or doctor.               These medicines have changed or have updated prescriptions.        Dose/Directions    * blood glucose monitoring test strip   Commonly known as:  ONE TOUCH ULTRA   This may have changed:  See the new instructions.   Used for:  Controlled type 2 diabetes mellitus with complication, without long-term current use of insulin (H)   Changed by:  Janis Marcial MD        USE TO TEST BLOOD SUGARS ONCE DAILY OR AS DIRECTED   Quantity:  50 each   Refills:  3       * blood glucose monitoring test strip   Commonly known as:  ONE TOUCH ULTRA   This may have changed:  Another medication with the same name was changed. Make sure you understand how and when to take each.   Used for:  Controlled type 2 diabetes mellitus with complication, without long-term current use of insulin (H)   Changed by:  Janis Marcial MD        Use to test blood sugars 1 times daily or as directed.   Quantity:  3 Box   Refills:  1       clotrimazole 1 % cream   Commonly known as:  LOTRIMIN   This may " have changed:  See the new instructions.   Used for:  Tinea pedis of both feet, Tinea cruris   Changed by:  Janis Marcial MD        APPLY TOPICALLY 2 TIMES DAILY   Quantity:  60 g   Refills:  3       omeprazole 40 MG capsule   Commonly known as:  priLOSEC   This may have changed:  See the new instructions.   Used for:  Gastroesophageal reflux disease without esophagitis   Changed by:  Janis Marcial MD        TAKE 1 CAPSULE EVERY DAY 30 - 60 MINUTES BEFORE A MEAL   Quantity:  90 capsule   Refills:  1       * Notice:  This list has 2 medication(s) that are the same as other medications prescribed for you. Read the directions carefully, and ask your doctor or other care provider to review them with you.         Where to get your medicines      These medications were sent to [facility] - [facility] [address]  [address], [facility] [address]     Phone:  [phone]     atorvastatin 80 MG tablet    blood glucose monitoring test strip    clotrimazole 1 % cream    fenofibrate 160 MG tablet    lisinopril 5 MG tablet    metFORMIN 500 MG tablet    metoprolol 100 MG 24 hr tablet    omeprazole 40 MG capsule    PARoxetine 20 MG tablet         Some of these will need a paper prescription and others can be bought over the counter.  Ask your nurse if you have questions.     Bring a paper prescription for each of these medications     blood glucose monitoring test strip                Primary Care Provider Office Phone # Fax #    Janis Marcial [phone]       [address]   [facility] [address]        Equal Access to Services     [facility] AH: Hadii jasbir servin hadasho Soomaali, waaxda luqadaha, qaybta kaalmada adeegyada, gray salas . So [facility] [phone].    ATENCIÓN: Si habla español, tiene a hough disposición servicios gratuitos de asistencia lingüística. Llame al [phone].    We comply with applicable  federal civil rights laws and [place] laws. We do not discriminate on the basis of race, color, national origin, age, disability sex, sexual orientation or gender identity.            Thank you!     Thank you for choosing [facility]  for your care. Our goal is always to provide you with excellent care. Hearing back from our patients is one way we can continue to improve our services. Please take a few minutes to complete the written survey that you may receive in the mail after your visit with us. Thank you!             Your Updated Medication List - Protect others around you: Learn how to safely use, store and throw away your medicines at www.disposemymeds.org.          This list is accurate as of: 8/17/17 10:27 AM.  Always use your most recent med list.                   Brand Name Dispense Instructions for use Diagnosis    aspirin 81 MG chewable tablet     180 tablet    Take 2 tablets (162 mg) by mouth daily    Ischemic heart disease due to coronary artery obstruction (H)       atorvastatin 80 MG tablet    LIPITOR    90 tablet    Take 1 tablet (80 mg) by mouth daily    Hyperlipidemia LDL goal <100       blood glucose monitoring lancets     1 Box    Use to test blood sugars 1 times daily or as directed.    Type 2 diabetes mellitus without complication (H)       blood glucose monitoring meter device kit    no brand specified    1 kit    1 kit continuous.    Type 2 diabetes, HbA1C goal < 8% (H)       * blood glucose monitoring test strip    ONE TOUCH ULTRA    50 each    USE TO TEST BLOOD SUGARS ONCE DAILY OR AS DIRECTED    Controlled type 2 diabetes mellitus with complication, without long-term current use of insulin (H)       * blood glucose monitoring test strip    ONE TOUCH ULTRA    3 Box    Use to test blood sugars 1 times daily or as directed.    Controlled type 2 diabetes mellitus with complication, without long-term current use of insulin (H)       clotrimazole 1 % cream    LOTRIMIN    60 g     APPLY TOPICALLY 2 TIMES DAILY    Tinea pedis of both feet, Tinea cruris       fenofibrate 160 MG tablet     90 tablet    Take 1 tablet (160 mg) by mouth daily    Hyperlipidemia LDL goal <100       lisinopril 5 MG tablet    PRINIVIL/ZESTRIL    90 tablet    Take 1 tablet (5 mg) by mouth daily    Essential hypertension, benign       metFORMIN 500 MG tablet    GLUCOPHAGE    180 tablet    Take 1 tablet (500 mg) by mouth 2 times daily (with meals)    Controlled type 2 diabetes mellitus with complication, without long-term current use of insulin (H)       metoprolol 100 MG 24 hr tablet    TOPROL-XL    90 tablet    Take 1 tablet (100 mg) by mouth daily    Essential hypertension, benign       nitroGLYcerin 0.4 MG sublingual tablet    NITROSTAT    25 tablet    For chest pain place 1 tablet under the tongue every 5 minutes for 3 doses. If symptoms persist 5 minutes after 1st dose call 911.    Ischemic heart disease due to coronary artery obstruction (H)       nystatin 813617 UNIT/GM Powd    MYCOSTATIN    60 g    Apply 1 g topically daily    Tinea cruris       nystatin-triamcinolone cream    MYCOLOG II    60 g    APPLY TOPICALLY TWO TIMES DAILY, DO NOT USE FOR MORE THAN 14 DAYS WITHOUT BREAK    Tinea cruris       omeprazole 40 MG capsule    priLOSEC    90 capsule    TAKE 1 CAPSULE EVERY DAY 30 - 60 MINUTES BEFORE A MEAL    Gastroesophageal reflux disease without esophagitis       PARoxetine 20 MG tablet    PAXIL    90 tablet    Take 1 tablet (20 mg) by mouth At Bedtime    Major depressive disorder, recurrent episode, in full remission (H)       polyethylene glycol powder    MIRALAX/GLYCOLAX    1054 g    MIX 1/2 CAPFUL IN 4 OUNCES OF LIQUID AND DRINK DAILY AS NEEDED    Diverticulosis of large intestine       * Notice:  This list has 2 medication(s) that are the same as other medications prescribed for you. Read the directions carefully, and ask your doctor or other care provider to review them with you.

## 2017-08-17 NOTE — NURSING NOTE
"Chief Complaint   Patient presents with     Physical     Initial /80 (BP Location: Right arm, Patient Position: Chair, Cuff Size: Adult Regular)  Pulse 52  Temp 98  F (36.7  C) (Oral)  Resp 16  Ht 6' 4\" (1.93 m)  Wt 236 lb 14.4 oz (107.5 kg)  SpO2 98%  BMI 28.84 kg/m2 Estimated body mass index is 28.84 kg/(m^2) as calculated from the following:    Height as of this encounter: 6' 4\" (1.93 m).    Weight as of this encounter: 236 lb 14.4 oz (107.5 kg).  BP completed using cuff size regular right arm.    Lisa Magill, CMA    "

## 2017-08-18 LAB
ALBUMIN SERPL-MCNC: 4.1 G/DL (ref 3.4–5)
ALP SERPL-CCNC: 60 U/L (ref 40–150)
ALT SERPL W P-5'-P-CCNC: 29 U/L (ref 0–70)
ANION GAP SERPL CALCULATED.3IONS-SCNC: 6 MMOL/L (ref 3–14)
AST SERPL W P-5'-P-CCNC: 21 U/L (ref 0–45)
BILIRUB SERPL-MCNC: 0.4 MG/DL (ref 0.2–1.3)
BUN SERPL-MCNC: 20 MG/DL (ref 7–30)
CALCIUM SERPL-MCNC: 9.1 MG/DL (ref 8.5–10.1)
CHLORIDE SERPL-SCNC: 108 MMOL/L (ref 94–109)
CO2 SERPL-SCNC: 27 MMOL/L (ref 20–32)
CREAT SERPL-MCNC: 1.08 MG/DL (ref 0.66–1.25)
CREAT UR-MCNC: 196 MG/DL
GFR SERPL CREATININE-BSD FRML MDRD: 71 ML/MIN/1.7M2
GLUCOSE SERPL-MCNC: 147 MG/DL (ref 70–99)
MICROALBUMIN UR-MCNC: 8 MG/L
MICROALBUMIN/CREAT UR: 3.84 MG/G CR (ref 0–17)
POTASSIUM SERPL-SCNC: 4.3 MMOL/L (ref 3.4–5.3)
PROT SERPL-MCNC: 6.7 G/DL (ref 6.8–8.8)
SODIUM SERPL-SCNC: 141 MMOL/L (ref 133–144)
TSH SERPL DL<=0.005 MIU/L-ACNC: 2.3 MU/L (ref 0.4–4)

## 2017-08-18 ASSESSMENT — ANXIETY QUESTIONNAIRES: GAD7 TOTAL SCORE: 0

## 2017-08-19 ENCOUNTER — MYC MEDICAL ADVICE (OUTPATIENT)
Dept: FAMILY MEDICINE | Facility: CLINIC | Age: 52
End: 2017-08-19

## 2017-08-19 ENCOUNTER — MYC REFILL (OUTPATIENT)
Dept: FAMILY MEDICINE | Facility: CLINIC | Age: 52
End: 2017-08-19

## 2017-08-19 DIAGNOSIS — E78.5 HYPERLIPIDEMIA LDL GOAL <100: ICD-10-CM

## 2017-08-19 DIAGNOSIS — I10 ESSENTIAL HYPERTENSION, BENIGN: ICD-10-CM

## 2017-08-19 RX ORDER — ATORVASTATIN CALCIUM 80 MG/1
80 TABLET, FILM COATED ORAL DAILY
Qty: 90 TABLET | Refills: 1 | Status: CANCELLED | OUTPATIENT
Start: 2017-08-19

## 2017-08-19 RX ORDER — METOPROLOL SUCCINATE 100 MG/1
100 TABLET, EXTENDED RELEASE ORAL DAILY
Qty: 90 TABLET | Refills: 1 | Status: CANCELLED | OUTPATIENT
Start: 2017-08-19

## 2017-08-21 NOTE — TELEPHONE ENCOUNTER
Message from Personal Web Systemshart:  Original authorizing provider: MD Edd Jay would like a refill of the following medications:  metoprolol (TOPROL-XL) 100 MG 24 hr tablet [Janis Marcial MD]  atorvastatin (LIPITOR) 80 MG tablet [Janis Marcial MD]    Preferred pharmacy: [facility] - [facility] [address]    Comment:

## 2017-08-31 ENCOUNTER — ALLIED HEALTH/NURSE VISIT (OUTPATIENT)
Dept: NURSING | Facility: CLINIC | Age: 52
End: 2017-08-31
Payer: COMMERCIAL

## 2017-08-31 VITALS — SYSTOLIC BLOOD PRESSURE: 140 MMHG | DIASTOLIC BLOOD PRESSURE: 80 MMHG

## 2017-08-31 DIAGNOSIS — I10 ESSENTIAL HYPERTENSION, BENIGN: Primary | ICD-10-CM

## 2017-08-31 PROCEDURE — 99207 ZZC NO CHARGE NURSE ONLY: CPT

## 2017-09-03 ENCOUNTER — MYC REFILL (OUTPATIENT)
Dept: FAMILY MEDICINE | Facility: CLINIC | Age: 52
End: 2017-09-03

## 2017-09-03 DIAGNOSIS — K57.30 DIVERTICULOSIS OF LARGE INTESTINE: ICD-10-CM

## 2017-09-03 DIAGNOSIS — E11.9 TYPE 2 DIABETES, HBA1C GOAL < 8% (H): ICD-10-CM

## 2017-09-03 DIAGNOSIS — K21.9 GASTROESOPHAGEAL REFLUX DISEASE WITHOUT ESOPHAGITIS: ICD-10-CM

## 2017-09-03 DIAGNOSIS — F33.42 MAJOR DEPRESSIVE DISORDER, RECURRENT EPISODE, IN FULL REMISSION (H): ICD-10-CM

## 2017-09-03 DIAGNOSIS — B35.6 TINEA CRURIS: ICD-10-CM

## 2017-09-03 DIAGNOSIS — E11.8 CONTROLLED TYPE 2 DIABETES MELLITUS WITH COMPLICATION, WITHOUT LONG-TERM CURRENT USE OF INSULIN (H): ICD-10-CM

## 2017-09-03 DIAGNOSIS — I25.9 ISCHEMIC HEART DISEASE DUE TO CORONARY ARTERY OBSTRUCTION (H): ICD-10-CM

## 2017-09-03 DIAGNOSIS — E78.5 HYPERLIPIDEMIA LDL GOAL <100: ICD-10-CM

## 2017-09-03 DIAGNOSIS — I24.0 ISCHEMIC HEART DISEASE DUE TO CORONARY ARTERY OBSTRUCTION (H): ICD-10-CM

## 2017-09-03 DIAGNOSIS — B35.3 TINEA PEDIS OF BOTH FEET: ICD-10-CM

## 2017-09-03 DIAGNOSIS — I10 ESSENTIAL HYPERTENSION, BENIGN: ICD-10-CM

## 2017-09-03 RX ORDER — OMEPRAZOLE 40 MG/1
CAPSULE, DELAYED RELEASE ORAL
Qty: 90 CAPSULE | Refills: 1 | Status: CANCELLED | OUTPATIENT
Start: 2017-09-03

## 2017-09-03 RX ORDER — METOPROLOL SUCCINATE 100 MG/1
100 TABLET, EXTENDED RELEASE ORAL DAILY
Qty: 90 TABLET | Refills: 1 | Status: CANCELLED | OUTPATIENT
Start: 2017-09-03

## 2017-09-03 RX ORDER — NITROGLYCERIN 0.4 MG/1
TABLET SUBLINGUAL
Qty: 25 TABLET | Refills: 0 | Status: CANCELLED | OUTPATIENT
Start: 2017-09-03

## 2017-09-03 RX ORDER — CLOTRIMAZOLE 1 %
CREAM (GRAM) TOPICAL
Qty: 60 G | Refills: 3 | Status: CANCELLED | OUTPATIENT
Start: 2017-09-03

## 2017-09-03 RX ORDER — ASPIRIN 81 MG/1
162 TABLET, CHEWABLE ORAL DAILY
Qty: 180 TABLET | Refills: 3 | Status: CANCELLED | OUTPATIENT
Start: 2017-09-03

## 2017-09-03 RX ORDER — NYSTATIN AND TRIAMCINOLONE ACETONIDE 100000; 1 [USP'U]/G; MG/G
CREAM TOPICAL
Qty: 60 G | Refills: 2 | Status: CANCELLED | OUTPATIENT
Start: 2017-09-03

## 2017-09-03 RX ORDER — POLYETHYLENE GLYCOL 3350 17 G/17G
POWDER, FOR SOLUTION ORAL
Qty: 1054 G | Refills: 3 | Status: CANCELLED | OUTPATIENT
Start: 2017-09-03

## 2017-09-03 RX ORDER — PAROXETINE 20 MG/1
20 TABLET, FILM COATED ORAL AT BEDTIME
Qty: 90 TABLET | Refills: 1 | Status: CANCELLED | OUTPATIENT
Start: 2017-09-03

## 2017-09-03 RX ORDER — FENOFIBRATE 160 MG/1
160 TABLET ORAL DAILY
Qty: 90 TABLET | Refills: 1 | Status: CANCELLED | OUTPATIENT
Start: 2017-09-03

## 2017-09-03 RX ORDER — ATORVASTATIN CALCIUM 80 MG/1
80 TABLET, FILM COATED ORAL DAILY
Qty: 90 TABLET | Refills: 1 | Status: CANCELLED | OUTPATIENT
Start: 2017-09-03

## 2017-09-03 RX ORDER — LISINOPRIL 5 MG/1
5 TABLET ORAL DAILY
Qty: 90 TABLET | Refills: 1 | Status: CANCELLED | OUTPATIENT
Start: 2017-09-03

## 2017-09-05 NOTE — TELEPHONE ENCOUNTER
Message from Jorget:  Original authorizing provider: MD Edd Jayon would like a refill of the following medications:  Blood Glucose Monitoring Suppl (BLOOD GLUCOSE METER) KIT [Janis Marcial MD]  nitroglycerin (NITROSTAT) 0.4 MG SL tablet [Janis Marcial MD]  aspirin 81 MG chewable tablet [Janis Marcial MD]  nystatin-triamcinolone (MYCOLOG II) cream [Janis Marcial MD]  polyethylene glycol (MIRALAX/GLYCOLAX) powder [Janis Marcial MD]  fenofibrate 160 MG tablet [Janis Marcial MD]  blood glucose monitoring (ONE TOUCH ULTRA) test strip [Janis Marcial MD]  metoprolol (TOPROL-XL) 100 MG 24 hr tablet [Janis Marcial MD]  omeprazole (PRILOSEC) 40 MG capsule [Janis Marcial MD]  atorvastatin (LIPITOR) 80 MG tablet [Janis Marcial MD]  clotrimazole (LOTRIMIN) 1 % cream [Janis Marcial MD]  PARoxetine (PAXIL) 20 MG tablet [Janis Marcial MD]  lisinopril (PRINIVIL/ZESTRIL) 5 MG tablet [Janis Marcial MD]  metFORMIN (GLUCOPHAGE) 500 MG tablet [Janis Marcial MD]  blood glucose monitoring (ONE TOUCH ULTRA) test strip [Janis Marcial MD]    Preferred pharmacy: [facility] - [facility] [address]    Comment:

## 2017-11-16 ENCOUNTER — TELEPHONE (OUTPATIENT)
Dept: FAMILY MEDICINE | Facility: CLINIC | Age: 52
End: 2017-11-16

## 2017-11-16 DIAGNOSIS — F33.42 MAJOR DEPRESSIVE DISORDER, RECURRENT EPISODE, IN FULL REMISSION (H): Primary | ICD-10-CM

## 2017-11-16 NOTE — TELEPHONE ENCOUNTER
Panel Management Review      Patient has the following on his problem list:     Depression / Dysthymia review    Measure:  Needs PHQ-9 score of 4 or less during index window.  Administer PHQ-9 and if score is 5 or more, send encounter to provider for next steps.    5 - 7 month window range:     PHQ-9 SCORE 5/25/2016 10/31/2016 8/17/2017   Total Score - - -   Total Score MyChart - - -   Total Score 0 0 0       If PHQ-9 recheck is 5 or more, route to provider for next steps.    Patient is due for:  DAP    Diabetes    ASA: Passed    Last A1C  Lab Results   Component Value Date    A1C 6.3 08/02/2017    A1C 6.2 02/10/2017    A1C 7.2 08/17/2016    A1C 6.9 03/15/2016    A1C 6.5 09/10/2015     A1C tested: Passed    Last LDL:    Lab Results   Component Value Date    CHOL 178 08/02/2017     Lab Results   Component Value Date    HDL 33 08/02/2017     Lab Results   Component Value Date     08/02/2017     Lab Results   Component Value Date    TRIG 199 08/02/2017     Lab Results   Component Value Date    CHOLHDLRATIO 4.7 09/10/2015     Lab Results   Component Value Date    NHDL 145 08/02/2017       Is the patient on a Statin? YES             Is the patient on Aspirin? YES    Medications     HMG CoA Reductase Inhibitors    atorvastatin (LIPITOR) 80 MG tablet    Salicylates    aspirin 81 MG chewable tablet          Last three blood pressure readings:  BP Readings from Last 3 Encounters:   08/31/17 140/80   08/17/17 156/86   10/29/16 138/72       Date of last diabetes office visit: 8/17/17     Tobacco History:     History   Smoking Status     Never Smoker   Smokeless Tobacco     Never Used           IVD   ASA: Passed    Last LDL:    Lab Results   Component Value Date    CHOL 178 08/02/2017     Lab Results   Component Value Date    HDL 33 08/02/2017     Lab Results   Component Value Date     08/02/2017     Lab Results   Component Value Date    TRIG 199 08/02/2017        Lab Results   Component Value Date     DAVYHDCINTHYA 4.7 09/10/2015        Is the patient on a Statin? YES   Is the patient on Aspirin? YES                  Medications     HMG CoA Reductase Inhibitors    atorvastatin (LIPITOR) 80 MG tablet    Salicylates    aspirin 81 MG chewable tablet          Last three blood pressure readings:  BP Readings from Last 3 Encounters:   08/31/17 140/80   08/17/17 156/86   10/29/16 138/72        Tobacco History:     History   Smoking Status     Never Smoker   Smokeless Tobacco     Never Used               Composite cancer screening  Chart review shows that this patient is due/due soon for the following None  Summary:    Patient is due/failing the following:   BP CHECK and DAP    Action needed:   Patient needs nurse only appointment, DAP    Type of outreach:    Sent Blinpick message.    Questions for provider review:    None                                                                                                                                    Reshma Waite MA       Chart routed to Care Team .

## 2017-11-16 NOTE — LETTER
My Depression Action Plan  Name: Edd Fong   Date of Birth 1965  Date: 11/16/2017    My doctor: Janis Marcial   My clinic: [address], [address]  [facility] [address] [phone]          GREEN    ZONE   Good Control    What it looks like:     Things are going generally well. You have normal up s and down s. You may even feel depressed from time to time, but bad moods usually last less than a day.   What you need to do:  1. Continue to care for yourself (see self care plan)  2. Check your depression survival kit and update it as needed  3. Follow your physician s recommendations including any medication.  4. Do not stop taking medication unless you consult with your physician first.           YELLOW         ZONE Getting Worse    What it looks like:     Depression is starting to interfere with your life.     It may be hard to get out of bed; you may be starting to isolate yourself from others.    Symptoms of depression are starting to last most all day and this has happened for several days.     You may have suicidal thoughts but they are not constant.   What you need to do:     1. Call your care team, your response to treatment will improve if you keep your care team informed of your progress. Yellow periods are signs an adjustment may need to be made.     2. Continue your self-care, even if you have to fake it!    3. Talk to someone in your support network    4. Open up your depression survival kit           RED    ZONE Medical Alert - Get Help    What it looks like:     Depression is seriously interfering with your life.     You may experience these or other symptoms: You can t get out of bed most days, can t work or engage in other necessary activities, you have trouble taking care of basic hygiene, or basic responsibilities, thoughts of suicide or death that will not go away, self-injurious behavior.     What you need to do:  1. Call  your care team and request a same-day appointment. If they are not available (weekends or after hours) call your local crisis line, emergency room or 911.      Electronically signed by: Reshma Waite, November 16, 2017    Depression Self Care Plan / Survival Kit    Self-Care for Depression  Here s the deal. Your body and mind are really not as separate as most people think.  What you do and think affects how you feel and how you feel influences what you do and think. This means if you do things that people who feel good do, it will help you feel better.  Sometimes this is all it takes.  There is also a place for medication and therapy depending on how severe your depression is, so be sure to consult with your medical provider and/ or Behavioral Health Consultant if your symptoms are worsening or not improving.     In order to better manage my stress, I will:    Exercise  Get some form of exercise, every day. This will help reduce pain and release endorphins, the  feel good  chemicals in your brain. This is almost as good as taking antidepressants!  This is not the same as joining a gym and then never going! (they count on that by the way ) It can be as simple as just going for a walk or doing some gardening, anything that will get you moving.      Hygiene   Maintain good hygiene (Get out of bed in the morning, Make your bed, Brush your teeth, Take a shower, and Get dressed like you were going to work, even if you are unemployed).  If your clothes don't fit try to get ones that do.    Diet  I will strive to eat foods that are good for me, drink plenty of water, and avoid excessive sugar, caffeine, alcohol, and other mood-altering substances.  Some foods that are helpful in depression are: complex carbohydrates, B vitamins, flaxseed, fish or fish oil, fresh fruits and vegetables.    Psychotherapy  I agree to participate in Individual Therapy (if recommended).    Medication  If prescribed medications, I agree to take  them.  Missing doses can result in serious side effects.  I understand that drinking alcohol, or other illicit drug use, may cause potential side effects.  I will not stop my medication abruptly without first discussing it with my provider.    Staying Connected With Others  I will stay in touch with my friends, family members, and my primary care provider/team.    Use your imagination  Be creative.  We all have a creative side; it doesn t matter if it s oil painting, sand castles, or mud pies! This will also kick up the endorphins.    Witness Beauty  (AKA stop and smell the roses) Take a look outside, even in mid-winter. Notice colors, textures. Watch the squirrels and birds.     Service to others  Be of service to others.  There is always someone else in need.  By helping others we can  get out of ourselves  and remember the really important things.  This also provides opportunities for practicing all the other parts of the program.    Humor  Laugh and be silly!  Adjust your TV habits for less news and crime-drama and more comedy.    Control your stress  Try breathing deep, massage therapy, biofeedback, and meditation. Find time to relax each day.     My support system    Clinic Contact:  Phone number:    Contact 1:  Phone number:    Contact 2:  Phone number:    Druze/:  Phone number:    Therapist:  Phone number:    Local crisis center:    Phone number:    Other community support:  Phone number:

## 2017-11-27 ENCOUNTER — ALLIED HEALTH/NURSE VISIT (OUTPATIENT)
Dept: NURSING | Facility: CLINIC | Age: 52
End: 2017-11-27
Payer: COMMERCIAL

## 2017-11-27 VITALS — DIASTOLIC BLOOD PRESSURE: 80 MMHG | SYSTOLIC BLOOD PRESSURE: 165 MMHG

## 2017-11-27 DIAGNOSIS — Z01.30 BLOOD PRESSURE CHECK: Primary | ICD-10-CM

## 2017-11-27 DIAGNOSIS — I10 HTN, GOAL BELOW 140/90: ICD-10-CM

## 2017-11-27 PROCEDURE — 99207 ZZC NO CHARGE NURSE ONLY: CPT

## 2017-11-27 RX ORDER — LISINOPRIL 20 MG/1
20 TABLET ORAL DAILY
Qty: 30 TABLET | Refills: 1 | Status: SHIPPED | OUTPATIENT
Start: 2017-11-27 | End: 2017-12-11

## 2017-11-27 NOTE — PROGRESS NOTES
Edd Fong is a 52 year old male who comes in today for a Blood Pressure check because of ongoing blood pressure monitoring.    *Document pulse and BP  *Use new set of vitals button for multiple readings.  *Use extended vitals for orthostatic    Vitals as recorded, a large cuff was used.    Patient is taking medication as prescribed  Patient is tolerating medications well.  Patient is monitoring Blood Pressure at home.  Average readings if yes are 140/71    Current complaints: none    Disposition: MD/AP notified while patient in the clinic      Reshma Waite MA    Per Dr. Marcial, she increased his Lisinopril to 20 mg daily, then to follow up within 2 weeks with her for recheck. Patient verbalized understanding.    Reshma Waite MA

## 2017-11-27 NOTE — MR AVS SNAPSHOT
After Visit Summary   11/27/2017    Edd Fong    MRN: [id number]           Patient Information     Date Of Birth          1965        Visit Information        Provider Department      11/27/2017 9:30 AM FM NURSE [facility]        Today's Diagnoses     Blood pressure check    -  1    HTN, goal below 140/90           Follow-ups after your visit        Who to contact     If you have questions or need follow up information about today's clinic visit or your schedule please contact [facility] directly at [phone].  Normal or non-critical lab and imaging results will be communicated to you by Pogoseathart, letter or phone within 4 business days after the clinic has received the results. If you do not hear from us within 7 days, please contact the clinic through MoVox[employer] or phone. If you have a critical or abnormal lab result, we will notify you by phone as soon as possible.  Submit refill requests through [employer] or call your pharmacy and they will forward the refill request to us. Please allow 3 business days for your refill to be completed.          Additional Information About Your Visit        MyChart Information     [employer] gives you secure access to your electronic health record. If you see a primary care provider, you can also send messages to your care team and make appointments. If you have questions, please call your primary care clinic.  If you do not have a primary care provider, please call [phone] and they will assist you.        Care EveryWhere ID     This is your Care EveryWhere ID. This could be used by other organizations to access your [facility] medical records  [id number]         Blood Pressure from Last 3 Encounters:   11/27/17 165/80   08/31/17 140/80   08/17/17 156/86    Weight from Last 3 Encounters:   08/17/17 236 lb 14.4 oz (107.5 kg)   10/29/16 240 lb (108.9 kg)   08/29/16 241 lb 8 oz (109.5 kg)              Today, you had the  following     No orders found for display         Today's Medication Changes          These changes are accurate as of: 11/27/17 10:02 AM.  If you have any questions, ask your nurse or doctor.               These medicines have changed or have updated prescriptions.        Dose/Directions    * lisinopril 5 MG tablet   Commonly known as:  PRINIVIL/ZESTRIL   This may have changed:  Another medication with the same name was added. Make sure you understand how and when to take each.   Used for:  Essential hypertension, benign        Dose:  5 mg   Take 1 tablet (5 mg) by mouth daily   Quantity:  90 tablet   Refills:  1       * lisinopril 20 MG tablet   Commonly known as:  PRINIVIL/ZESTRIL   This may have changed:  You were already taking a medication with the same name, and this prescription was added. Make sure you understand how and when to take each.   Used for:  HTN, goal below 140/90        Dose:  20 mg   Take 1 tablet (20 mg) by mouth daily   Quantity:  30 tablet   Refills:  1       * Notice:  This list has 2 medication(s) that are the same as other medications prescribed for you. Read the directions carefully, and ask your doctor or other care provider to review them with you.         Where to get your medicines      These medications were sent to [facility] [id number] [place] [address]  [address] [facility] [address]     Phone:  [phone]     lisinopril 20 MG tablet                Primary Care Provider Office Phone # Fax #    Janis Beatrice Marcial [phone]       [id number]  [address]   [facility] [address]        Equal Access to Services     [facility] AH: Hadii aad ku hadasho Soomaali, waaxda luqadaha, qaybta kaalmada adeegyada, gray plasencia. So [facility] [phone].    ATENCIÓN: Si habla español, tiene a hough disposición servicios gratuitos de asistencia lingüística. Llame al [phone].    We comply with applicable federal  civil rights laws and [place] laws. We do not discriminate on the basis of race, color, national origin, age, disability, sex, sexual orientation, or gender identity.            Thank you!     Thank you for choosing [facility]  for your care. Our goal is always to provide you with excellent care. Hearing back from our patients is one way we can continue to improve our services. Please take a few minutes to complete the written survey that you may receive in the mail after your visit with us. Thank you!             Your Updated Medication List - Protect others around you: Learn how to safely use, store and throw away your medicines at www.disposemymeds.org.          This list is accurate as of: 11/27/17 10:02 AM.  Always use your most recent med list.                   Brand Name Dispense Instructions for use Diagnosis    aspirin 81 MG chewable tablet     180 tablet    Take 2 tablets (162 mg) by mouth daily    Ischemic heart disease due to coronary artery obstruction (H)       atorvastatin 80 MG tablet    LIPITOR    90 tablet    Take 1 tablet (80 mg) by mouth daily    Hyperlipidemia LDL goal <100       blood glucose monitoring lancets     1 Box    Use to test blood sugars 1 times daily or as directed.    Type 2 diabetes mellitus without complication (H)       blood glucose monitoring meter device kit    no brand specified    1 kit    1 kit continuous.    Type 2 diabetes, HbA1C goal < 8% (H)       * blood glucose monitoring test strip    ONETOUCH ULTRA    50 each    USE TO TEST BLOOD SUGARS ONCE DAILY OR AS DIRECTED    Controlled type 2 diabetes mellitus with complication, without long-term current use of insulin (H)       * blood glucose monitoring test strip    ONETOUCH ULTRA    3 Box    Use to test blood sugars 1 times daily or as directed.    Controlled type 2 diabetes mellitus with complication, without long-term current use of insulin (H)       clotrimazole 1 % cream    LOTRIMIN    60 g     APPLY TOPICALLY 2 TIMES DAILY    Tinea pedis of both feet       fenofibrate 160 MG tablet     90 tablet    Take 1 tablet (160 mg) by mouth daily    Hyperlipidemia LDL goal <100       * lisinopril 5 MG tablet    PRINIVIL/ZESTRIL    90 tablet    Take 1 tablet (5 mg) by mouth daily    Essential hypertension, benign       * lisinopril 20 MG tablet    PRINIVIL/ZESTRIL    30 tablet    Take 1 tablet (20 mg) by mouth daily    HTN, goal below 140/90       metFORMIN 500 MG tablet    GLUCOPHAGE    180 tablet    Take 1 tablet (500 mg) by mouth 2 times daily (with meals)    Controlled type 2 diabetes mellitus with complication, without long-term current use of insulin (H)       metoprolol 100 MG 24 hr tablet    TOPROL-XL    90 tablet    Take 1 tablet (100 mg) by mouth daily    Essential hypertension, benign       nitroGLYcerin 0.4 MG sublingual tablet    NITROSTAT    25 tablet    For chest pain place 1 tablet under the tongue every 5 minutes for 3 doses. If symptoms persist 5 minutes after 1st dose call 911.    Ischemic heart disease due to coronary artery obstruction (H)       nystatin 274806 UNIT/GM Powd    MYCOSTATIN    60 g    Apply 1 g topically daily    Tinea cruris       nystatin-triamcinolone cream    MYCOLOG II    60 g    APPLY TOPICALLY TWO TIMES DAILY, DO NOT USE FOR MORE THAN 14 DAYS WITHOUT BREAK    Tinea cruris       omeprazole 40 MG capsule    priLOSEC    90 capsule    TAKE 1 CAPSULE EVERY DAY 30 - 60 MINUTES BEFORE A MEAL    Gastroesophageal reflux disease without esophagitis       PARoxetine 20 MG tablet    PAXIL    90 tablet    Take 1 tablet (20 mg) by mouth At Bedtime    Major depressive disorder, recurrent episode, in full remission (H)       polyethylene glycol powder    MIRALAX/GLYCOLAX    1054 g    MIX 1/2 CAPFUL IN 4 OUNCES OF LIQUID AND DRINK DAILY AS NEEDED    Diverticulosis of large intestine       * Notice:  This list has 4 medication(s) that are the same as other medications prescribed for you. Read  the directions carefully, and ask your doctor or other care provider to review them with you.

## 2017-12-03 ENCOUNTER — MYC REFILL (OUTPATIENT)
Dept: FAMILY MEDICINE | Facility: CLINIC | Age: 52
End: 2017-12-03

## 2017-12-03 DIAGNOSIS — F33.42 MAJOR DEPRESSIVE DISORDER, RECURRENT EPISODE, IN FULL REMISSION (H): ICD-10-CM

## 2017-12-03 DIAGNOSIS — K57.30 DIVERTICULOSIS OF LARGE INTESTINE: ICD-10-CM

## 2017-12-03 DIAGNOSIS — B35.3 TINEA PEDIS OF BOTH FEET: ICD-10-CM

## 2017-12-03 DIAGNOSIS — I10 HTN, GOAL BELOW 140/90: ICD-10-CM

## 2017-12-03 DIAGNOSIS — I10 ESSENTIAL HYPERTENSION, BENIGN: ICD-10-CM

## 2017-12-03 RX ORDER — CLOTRIMAZOLE 1 %
CREAM (GRAM) TOPICAL
Qty: 60 G | Refills: 3 | Status: CANCELLED | OUTPATIENT
Start: 2017-12-03

## 2017-12-03 RX ORDER — PAROXETINE 20 MG/1
20 TABLET, FILM COATED ORAL AT BEDTIME
Qty: 90 TABLET | Refills: 1 | Status: CANCELLED | OUTPATIENT
Start: 2017-12-03

## 2017-12-03 RX ORDER — POLYETHYLENE GLYCOL 3350 17 G/17G
POWDER, FOR SOLUTION ORAL
Qty: 1054 G | Refills: 3 | Status: CANCELLED | OUTPATIENT
Start: 2017-12-03

## 2017-12-03 RX ORDER — METOPROLOL SUCCINATE 100 MG/1
100 TABLET, EXTENDED RELEASE ORAL DAILY
Qty: 90 TABLET | Refills: 1 | Status: CANCELLED | OUTPATIENT
Start: 2017-12-03

## 2017-12-03 RX ORDER — LISINOPRIL 20 MG/1
20 TABLET ORAL DAILY
Qty: 30 TABLET | Refills: 1 | Status: CANCELLED | OUTPATIENT
Start: 2017-12-03

## 2017-12-04 NOTE — TELEPHONE ENCOUNTER
Message from MyChart:  Original authorizing provider: MD Edd Jay would like a refill of the following medications:  polyethylene glycol (MIRALAX/GLYCOLAX) powder [Janis Marcial MD]  metoprolol (TOPROL-XL) 100 MG 24 hr tablet [Janis Marcial MD]  clotrimazole (LOTRIMIN) 1 % cream [Janis Marcial MD]  PARoxetine (PAXIL) 20 MG tablet [Janis Marcial MD]  lisinopril (PRINIVIL/ZESTRIL) 20 MG tablet [Janis Marcial MD]    Preferred pharmacy: [facility] [address]    Comment:

## 2017-12-11 ENCOUNTER — OFFICE VISIT (OUTPATIENT)
Dept: FAMILY MEDICINE | Facility: CLINIC | Age: 52
End: 2017-12-11
Payer: COMMERCIAL

## 2017-12-11 VITALS
WEIGHT: 235.3 LBS | BODY MASS INDEX: 28.64 KG/M2 | SYSTOLIC BLOOD PRESSURE: 170 MMHG | RESPIRATION RATE: 20 BRPM | OXYGEN SATURATION: 98 % | HEART RATE: 58 BPM | TEMPERATURE: 98.1 F | DIASTOLIC BLOOD PRESSURE: 80 MMHG

## 2017-12-11 DIAGNOSIS — I10 HTN, GOAL BELOW 140/90: ICD-10-CM

## 2017-12-11 PROCEDURE — 99213 OFFICE O/P EST LOW 20 MIN: CPT | Performed by: FAMILY MEDICINE

## 2017-12-11 RX ORDER — LISINOPRIL 40 MG/1
40 TABLET ORAL DAILY
Qty: 90 TABLET | Refills: 1 | Status: SHIPPED | OUTPATIENT
Start: 2017-12-11 | End: 2017-12-11

## 2017-12-11 RX ORDER — LISINOPRIL 40 MG/1
40 TABLET ORAL DAILY
Qty: 30 TABLET | Refills: 1 | Status: SHIPPED | OUTPATIENT
Start: 2017-12-11 | End: 2018-01-16

## 2017-12-11 ASSESSMENT — PAIN SCALES - GENERAL: PAINLEVEL: NO PAIN (0)

## 2017-12-11 NOTE — PROGRESS NOTES
HPI   SUBJECTIVE:   Edd Fong is a 52 year old male who presents to clinic today for the following health issues:    Hypertension Follow-up      Outpatient blood pressures are being checked at home and at fitness center. Results are 122/72 at the fitness center .    Low Salt Diet: no added salt    Amount of exercise or physical activity: 4-5 days/week for an average of 30-45 minutes    Problems taking medications regularly: No    Medication side effects: none    Diet: regular (no restrictions)    Blood pressure is high again, although weight is largely unchanged. Lisinopril does not cause him to cough     Cold  Edd is coming off of a cold right now, with mucus, a sore throat, dry cough, and runny nose. Denies SOB, wheezing, or heavy breathing. He has had this illness for 2-3 weeks at this point.     Diet  Pt is avoiding snacking at night. Besides this, he says that his diet is largely unchanged. He does try to manage salt intake. Eats fruits and vegetables every day (especially lettuce and pineapples). He occasionally eats bananas as well.     Exercise  Patient is exercising.     PROBLEMS TO ADD ON...    Problem list and histories reviewed & adjusted, as indicated.  Additional history: as documented    Lab Results   Component Value Date    A1C 6.3 08/02/2017    A1C 6.2 02/10/2017    A1C 7.2 08/17/2016    A1C 6.9 03/15/2016    A1C 6.5 09/10/2015       BP Readings from Last 3 Encounters:   12/11/17 170/80   11/27/17 165/80   08/31/17 140/80    Wt Readings from Last 3 Encounters:   12/11/17 106.7 kg (235 lb 4.8 oz)   08/17/17 107.5 kg (236 lb 14.4 oz)   10/29/16 108.9 kg (240 lb)         Labs reviewed in EPIC    Reviewed and updated as needed this visit by clinical staffTobacco  Allergies  Meds  Problems  Med Hx  Surg Hx  Fam Hx  Soc Hx        Reviewed and updated as needed this visit by Provider       ROS:  Constitutional, HEENT, cardiovascular, pulmonary, gi and gu systems are negative, except as  otherwise noted.    This document serves as a record of the services and decisions personally performed and made by Janis Marcial MD. It was created on his/her behalf by Taiwo Roland, a trained medical scribe. The creation of this document is based the provider's statements to the medical scribe.  Ulisesibcat Roland 10:49 AM, December 11, 2017  OBJECTIVE:     /80 (BP Location: Right arm, Patient Position: Chair, Cuff Size: Adult Large)  Pulse 58  Temp 98.1  F (36.7  C) (Oral)  Resp 20  Wt 106.7 kg (235 lb 4.8 oz)  SpO2 98%  BMI 28.64 kg/m2  Body mass index is 28.64 kg/(m^2).  GENERAL: healthy, alert and no distress  HENT: ear canals and TM's normal, nose and mouth without ulcers or lesions  MS: no gross musculoskeletal defects noted, no edema  SKIN: no suspicious lesions or rashes  NEURO: Normal strength and tone, mentation intact and speech normal  PSYCH: mentation appears normal, affect normal/bright    Diagnostic Test Results:  none     ASSESSMENT/PLAN:     (I10) HTN, goal below 140/90  Comment: Patient's BP is largely uncontrolled on 20 mg lisinopril over the past 2 weeks; recommend raising dose to 40 mg lisinopril, also recommended healthy diet and exercise (fruits, vegetables, high potassium diet with beans, bananas, potatoes w/o dressing). Cont toprol  Plan: lisinopril (PRINIVIL/ZESTRIL) 40 MG tablet,         DISCONTINUED: lisinopril (PRINIVIL/ZESTRIL) 40         MG tablet          RTC in 2 weeks for BP check and in 1 month for diabetes check.     The information in this document, created by the medical scribe for me, accurately reflects the services I personally performed and the decisions made by me. I have reviewed and approved this document for accuracy prior to leaving the patient care area.  Janis Marcial MD  10:49 AM, 12/11/17    Janis Marcial MD  [facility]    Physical Exam

## 2017-12-11 NOTE — PATIENT INSTRUCTIONS
Blood pressure check in 1-2 weeks, nurse only    Increase potassium in diet, like bananas, baked or roasted potatoes, beans,

## 2017-12-11 NOTE — NURSING NOTE
"Chief Complaint   Patient presents with     Hypertension     Initial /80 (BP Location: Right arm, Patient Position: Chair, Cuff Size: Adult Large)  Pulse 58  Temp 98.1  F (36.7  C) (Oral)  Resp 20  Wt 235 lb 4.8 oz (106.7 kg)  SpO2 98%  BMI 28.64 kg/m2 Estimated body mass index is 28.64 kg/(m^2) as calculated from the following:    Height as of 8/17/17: 6' 4\" (1.93 m).    Weight as of this encounter: 235 lb 4.8 oz (106.7 kg).  BP completed using cuff size large right arm.    Lisa Magill, CMA    "

## 2017-12-11 NOTE — MR AVS SNAPSHOT
After Visit Summary   12/11/2017    Edd Fong    MRN: [id number]           Patient Information     Date Of Birth          1965        Visit Information        Provider Department      12/11/2017 10:20 AM Janis Marcial MD [facility]        Today's Diagnoses     HTN, goal below 140/90          Care Instructions    Blood pressure check in 1-2 weeks, nurse only    Increase potassium in diet, like bananas, baked or roasted potatoes, beans,          Follow-ups after your visit        Follow-up notes from your care team     Return in about 4 weeks (around 1/8/2018), or for diabetes check.      Who to contact     If you have questions or need follow up information about today's clinic visit or your schedule please contact [facility] directly at [phone].  Normal or non-critical lab and imaging results will be communicated to you by MyChart, letter or phone within 4 business days after the clinic has received the results. If you do not hear from us within 7 days, please contact the clinic through [employer] or phone. If you have a critical or abnormal lab result, we will notify you by phone as soon as possible.  Submit refill requests through [employer] or call your pharmacy and they will forward the refill request to us. Please allow 3 business days for your refill to be completed.          Additional Information About Your Visit        MyChart Information     [employer] gives you secure access to your electronic health record. If you see a primary care provider, you can also send messages to your care team and make appointments. If you have questions, please call your primary care clinic.  If you do not have a primary care provider, please call [phone] and they will assist you.        Care EveryWhere ID     This is your Care EveryWhere ID. This could be used by other organizations to access your [facility] medical records  [id number]        Your Vitals  Were     Pulse Temperature Respirations Pulse Oximetry BMI (Body Mass Index)       58 98.1  F (36.7  C) (Oral) 20 98% 28.64 kg/m2        Blood Pressure from Last 3 Encounters:   12/11/17 170/80   11/27/17 165/80   08/31/17 140/80    Weight from Last 3 Encounters:   12/11/17 235 lb 4.8 oz (106.7 kg)   08/17/17 236 lb 14.4 oz (107.5 kg)   10/29/16 240 lb (108.9 kg)              Today, you had the following     No orders found for display         Today's Medication Changes          These changes are accurate as of: 12/11/17 10:59 AM.  If you have any questions, ask your nurse or doctor.               These medicines have changed or have updated prescriptions.        Dose/Directions    lisinopril 40 MG tablet   Commonly known as:  PRINIVIL/ZESTRIL   This may have changed:    - medication strength  - how much to take  - Another medication with the same name was removed. Continue taking this medication, and follow the directions you see here.   Used for:  HTN, goal below 140/90   Changed by:  Janis Marcial MD        Dose:  40 mg   Take 1 tablet (40 mg) by mouth daily   Quantity:  90 tablet   Refills:  1         Stop taking these medicines if you haven't already. Please contact your care team if you have questions.     nystatin [id number] UNIT/GM Powd   Commonly known as:  MYCOSTATIN   Stopped by:  Janis Marcial MD                Where to get your medicines      These medications were sent to [facility] - [facility] [address] [facility] [address] [facility]  [address], [facility] [address]     Phone:  [phone]     lisinopril 40 MG tablet                Primary Care Provider Office Phone # Fax #    Janis Marcial [phone] [id number]   [facility] [address]        Equal Access to Services     [facility] DELISA AH: Joe alcantarao Soomaali, waaxda luqadaha, qaybta kaalmada adeegyada, waxay teri plasencia. So [facility]  [phone].    ATENCIÓN: Si juanpablo lao, tiene a hough disposición servicios gratuitos de asistencia lingüística. Mray perales [phone].    We comply with applicable federal civil rights laws and [place] laws. We do not discriminate on the basis of race, color, national origin, age, disability, sex, sexual orientation, or gender identity.            Thank you!     Thank you for choosing [facility]  for your care. Our goal is always to provide you with excellent care. Hearing back from our patients is one way we can continue to improve our services. Please take a few minutes to complete the written survey that you may receive in the mail after your visit with us. Thank you!             Your Updated Medication List - Protect others around you: Learn how to safely use, store and throw away your medicines at www.disposemymeds.org.          This list is accurate as of: 12/11/17 10:59 AM.  Always use your most recent med list.                   Brand Name Dispense Instructions for use Diagnosis    aspirin 81 MG chewable tablet     180 tablet    Take 2 tablets (162 mg) by mouth daily    Ischemic heart disease due to coronary artery obstruction (H)       atorvastatin 80 MG tablet    LIPITOR    90 tablet    Take 1 tablet (80 mg) by mouth daily    Hyperlipidemia LDL goal <100       blood glucose monitoring lancets     1 Box    Use to test blood sugars 1 times daily or as directed.    Type 2 diabetes mellitus without complication (H)       blood glucose monitoring meter device kit    no brand specified    1 kit    1 kit continuous.    Type 2 diabetes, HbA1C goal < 8% (H)       * blood glucose monitoring test strip    ONETOUCH ULTRA    50 each    USE TO TEST BLOOD SUGARS ONCE DAILY OR AS DIRECTED    Controlled type 2 diabetes mellitus with complication, without long-term current use of insulin (H)       * blood glucose monitoring test strip    ONETOUCH ULTRA    3 Box    Use to test blood sugars 1 times daily  or as directed.    Controlled type 2 diabetes mellitus with complication, without long-term current use of insulin (H)       clotrimazole 1 % cream    LOTRIMIN    60 g    APPLY TOPICALLY 2 TIMES DAILY    Tinea pedis of both feet       fenofibrate 160 MG tablet     90 tablet    Take 1 tablet (160 mg) by mouth daily    Hyperlipidemia LDL goal <100       lisinopril 40 MG tablet    PRINIVIL/ZESTRIL    90 tablet    Take 1 tablet (40 mg) by mouth daily    HTN, goal below 140/90       metFORMIN 500 MG tablet    GLUCOPHAGE    180 tablet    Take 1 tablet (500 mg) by mouth 2 times daily (with meals)    Controlled type 2 diabetes mellitus with complication, without long-term current use of insulin (H)       metoprolol 100 MG 24 hr tablet    TOPROL-XL    90 tablet    Take 1 tablet (100 mg) by mouth daily    Essential hypertension, benign       nitroGLYcerin 0.4 MG sublingual tablet    NITROSTAT    25 tablet    For chest pain place 1 tablet under the tongue every 5 minutes for 3 doses. If symptoms persist 5 minutes after 1st dose call 911.    Ischemic heart disease due to coronary artery obstruction (H)       nystatin-triamcinolone cream    MYCOLOG II    60 g    APPLY TOPICALLY TWO TIMES DAILY, DO NOT USE FOR MORE THAN 14 DAYS WITHOUT BREAK    Tinea cruris       omeprazole 40 MG capsule    priLOSEC    90 capsule    TAKE 1 CAPSULE EVERY DAY 30 - 60 MINUTES BEFORE A MEAL    Gastroesophageal reflux disease without esophagitis       PARoxetine 20 MG tablet    PAXIL    90 tablet    Take 1 tablet (20 mg) by mouth At Bedtime    Major depressive disorder, recurrent episode, in full remission (H)       polyethylene glycol powder    MIRALAX/GLYCOLAX    1054 g    MIX 1/2 CAPFUL IN 4 OUNCES OF LIQUID AND DRINK DAILY AS NEEDED    Diverticulosis of large intestine       * Notice:  This list has 2 medication(s) that are the same as other medications prescribed for you. Read the directions carefully, and ask your doctor or other care provider  to review them with you.

## 2018-01-03 ENCOUNTER — MYC MEDICAL ADVICE (OUTPATIENT)
Dept: FAMILY MEDICINE | Facility: CLINIC | Age: 53
End: 2018-01-03

## 2018-01-16 ENCOUNTER — OFFICE VISIT (OUTPATIENT)
Dept: FAMILY MEDICINE | Facility: CLINIC | Age: 53
End: 2018-01-16
Payer: COMMERCIAL

## 2018-01-16 VITALS
SYSTOLIC BLOOD PRESSURE: 144 MMHG | HEIGHT: 76 IN | RESPIRATION RATE: 16 BRPM | TEMPERATURE: 97.9 F | BODY MASS INDEX: 28.87 KG/M2 | DIASTOLIC BLOOD PRESSURE: 80 MMHG | WEIGHT: 237.1 LBS | HEART RATE: 64 BPM

## 2018-01-16 DIAGNOSIS — E11.8 CONTROLLED TYPE 2 DIABETES MELLITUS WITH COMPLICATION, WITHOUT LONG-TERM CURRENT USE OF INSULIN (H): Primary | ICD-10-CM

## 2018-01-16 DIAGNOSIS — I10 HTN, GOAL BELOW 140/90: ICD-10-CM

## 2018-01-16 LAB — HBA1C MFR BLD: 6.6 % (ref 4.3–6)

## 2018-01-16 PROCEDURE — 80053 COMPREHEN METABOLIC PANEL: CPT | Performed by: FAMILY MEDICINE

## 2018-01-16 PROCEDURE — 83036 HEMOGLOBIN GLYCOSYLATED A1C: CPT | Performed by: FAMILY MEDICINE

## 2018-01-16 PROCEDURE — 36415 COLL VENOUS BLD VENIPUNCTURE: CPT | Performed by: FAMILY MEDICINE

## 2018-01-16 PROCEDURE — 99214 OFFICE O/P EST MOD 30 MIN: CPT | Performed by: FAMILY MEDICINE

## 2018-01-16 RX ORDER — HYDROCHLOROTHIAZIDE 12.5 MG/1
12.5 CAPSULE ORAL DAILY
Qty: 90 CAPSULE | Refills: 1 | Status: SHIPPED | OUTPATIENT
Start: 2018-01-16 | End: 2018-06-03

## 2018-01-16 RX ORDER — LISINOPRIL 40 MG/1
40 TABLET ORAL DAILY
Qty: 90 TABLET | Refills: 1 | Status: SHIPPED | OUTPATIENT
Start: 2018-01-16 | End: 2018-06-23

## 2018-01-16 ASSESSMENT — PAIN SCALES - GENERAL: PAINLEVEL: NO PAIN (0)

## 2018-01-16 NOTE — NURSING NOTE
"Chief Complaint   Patient presents with     Hypertension     Initial /86 (BP Location: Right arm, Patient Position: Chair, Cuff Size: Adult Regular)  Pulse 64  Temp 97.9  F (36.6  C) (Oral)  Resp 16  Ht 6' 4\" (1.93 m)  Wt 237 lb 1.6 oz (107.5 kg)  BMI 28.86 kg/m2 Estimated body mass index is 28.86 kg/(m^2) as calculated from the following:    Height as of this encounter: 6' 4\" (1.93 m).    Weight as of this encounter: 237 lb 1.6 oz (107.5 kg).  BP completed using cuff size regular right arm.    Lisa Magill, CMA    "

## 2018-01-16 NOTE — MR AVS SNAPSHOT
After Visit Summary   1/16/2018    Edd Fong    MRN: [id number]           Patient Information     Date Of Birth          1965        Visit Information        Provider Department      1/16/2018 10:00 AM Janis Marcial MD [facility]        Today's Diagnoses     Controlled type 2 diabetes mellitus with complication, without long-term current use of insulin (H)    -  1    HTN, goal below 140/90          Care Instructions    Nurse only 2 wk BP check          Follow-ups after your visit        Follow-up notes from your care team     Return in about 2 weeks (around 1/30/2018).      Who to contact     If you have questions or need follow up information about today's clinic visit or your schedule please contact [facility] directly at [phone].  Normal or non-critical lab and imaging results will be communicated to you by MyChart, letter or phone within 4 business days after the clinic has received the results. If you do not hear from us within 7 days, please contact the clinic through MyChart or phone. If you have a critical or abnormal lab result, we will notify you by phone as soon as possible.  Submit refill requests through [employer] or call your pharmacy and they will forward the refill request to us. Please allow 3 business days for your refill to be completed.          Additional Information About Your Visit        MyChart Information     [employer] gives you secure access to your electronic health record. If you see a primary care provider, you can also send messages to your care team and make appointments. If you have questions, please call your primary care clinic.  If you do not have a primary care provider, please call [phone] and they will assist you.        Care EveryWhere ID     This is your Care EveryWhere ID. This could be used by other organizations to access your [facility] medical records  [id number]        Your Vitals Were     Pulse  "Temperature Respirations Height BMI (Body Mass Index)       64 97.9  F (36.6  C) (Oral) 16 6' 4\" (1.93 m) 28.86 kg/m2        Blood Pressure from Last 3 Encounters:   01/16/18 160/86   12/11/17 170/80   11/27/17 165/80    Weight from Last 3 Encounters:   01/16/18 237 lb 1.6 oz (107.5 kg)   12/11/17 235 lb 4.8 oz (106.7 kg)   08/17/17 236 lb 14.4 oz (107.5 kg)              We Performed the Following     Comprehensive metabolic panel     HEMOGLOBIN A1C          Today's Medication Changes          These changes are accurate as of: 1/16/18 10:36 AM.  If you have any questions, ask your nurse or doctor.               Start taking these medicines.        Dose/Directions    hydrochlorothiazide 12.5 MG capsule   Commonly known as:  MICROZIDE   Used for:  HTN, goal below 140/90   Started by:  Janis Marcial MD        Dose:  12.5 mg   Take 1 capsule (12.5 mg) by mouth daily   Quantity:  90 capsule   Refills:  1            Where to get your medicines      These medications were sent to [facility] [address]  [address] [facility] [address]     Phone:  [phone]     hydrochlorothiazide 12.5 MG capsule    lisinopril 40 MG tablet                Primary Care Provider Office Phone # Fax #    Janis Marcial [phone]       [id number]  [address]   [facility] [address]        Equal Access to Services     [facility] AH: Hadii aad ku hadasho Soomaali, waaxda luqadaha, qaybta kaalmada adeegyada, waxay teri plasencia. So [facility] [phone].    ATENCIÓN: Si habla español, tiene a hough disposición servicios gratuitos de asistencia lingüística. Llame al [phone].    We comply with applicable federal civil rights laws and [place] laws. We do not discriminate on the basis of race, color, national origin, age, disability, sex, sexual orientation, or gender identity.            Thank you!     Thank you for choosing [facility] " [facility]  for your care. Our goal is always to provide you with excellent care. Hearing back from our patients is one way we can continue to improve our services. Please take a few minutes to complete the written survey that you may receive in the mail after your visit with us. Thank you!             Your Updated Medication List - Protect others around you: Learn how to safely use, store and throw away your medicines at www.disposemymeds.org.          This list is accurate as of: 1/16/18 10:36 AM.  Always use your most recent med list.                   Brand Name Dispense Instructions for use Diagnosis    aspirin 81 MG chewable tablet     180 tablet    Take 2 tablets (162 mg) by mouth daily    Ischemic heart disease due to coronary artery obstruction (H)       atorvastatin 80 MG tablet    LIPITOR    90 tablet    Take 1 tablet (80 mg) by mouth daily    Hyperlipidemia LDL goal <100       blood glucose monitoring lancets     1 Box    Use to test blood sugars 1 times daily or as directed.    Type 2 diabetes mellitus without complication (H)       blood glucose monitoring meter device kit    no brand specified    1 kit    1 kit continuous.    Type 2 diabetes, HbA1C goal < 8% (H)       * blood glucose monitoring test strip    ONETOUCH ULTRA    50 each    USE TO TEST BLOOD SUGARS ONCE DAILY OR AS DIRECTED    Controlled type 2 diabetes mellitus with complication, without long-term current use of insulin (H)       * blood glucose monitoring test strip    ONETOUCH ULTRA    3 Box    Use to test blood sugars 1 times daily or as directed.    Controlled type 2 diabetes mellitus with complication, without long-term current use of insulin (H)       clotrimazole 1 % cream    LOTRIMIN    60 g    APPLY TOPICALLY 2 TIMES DAILY    Tinea pedis of both feet       fenofibrate 160 MG tablet     90 tablet    Take 1 tablet (160 mg) by mouth daily    Hyperlipidemia LDL goal <100       hydrochlorothiazide 12.5 MG capsule    MICROZIDE    90  capsule    Take 1 capsule (12.5 mg) by mouth daily    HTN, goal below 140/90       lisinopril 40 MG tablet    PRINIVIL/ZESTRIL    90 tablet    Take 1 tablet (40 mg) by mouth daily    HTN, goal below 140/90       metFORMIN 500 MG tablet    GLUCOPHAGE    180 tablet    Take 1 tablet (500 mg) by mouth 2 times daily (with meals)    Controlled type 2 diabetes mellitus with complication, without long-term current use of insulin (H)       metoprolol succinate 100 MG 24 hr tablet    TOPROL-XL    90 tablet    Take 1 tablet (100 mg) by mouth daily    Essential hypertension, benign       nitroGLYcerin 0.4 MG sublingual tablet    NITROSTAT    25 tablet    For chest pain place 1 tablet under the tongue every 5 minutes for 3 doses. If symptoms persist 5 minutes after 1st dose call 911.    Ischemic heart disease due to coronary artery obstruction (H)       nystatin-triamcinolone cream    MYCOLOG II    60 g    APPLY TOPICALLY TWO TIMES DAILY, DO NOT USE FOR MORE THAN 14 DAYS WITHOUT BREAK    Tinea cruris       omeprazole 40 MG capsule    priLOSEC    90 capsule    TAKE 1 CAPSULE EVERY DAY 30 - 60 MINUTES BEFORE A MEAL    Gastroesophageal reflux disease without esophagitis       PARoxetine 20 MG tablet    PAXIL    90 tablet    Take 1 tablet (20 mg) by mouth At Bedtime    Major depressive disorder, recurrent episode, in full remission (H)       polyethylene glycol powder    MIRALAX/GLYCOLAX    1054 g    MIX 1/2 CAPFUL IN 4 OUNCES OF LIQUID AND DRINK DAILY AS NEEDED    Diverticulosis of large intestine       * Notice:  This list has 2 medication(s) that are the same as other medications prescribed for you. Read the directions carefully, and ask your doctor or other care provider to review them with you.

## 2018-01-16 NOTE — PROGRESS NOTES
HPI   SUBJECTIVE:   Edd Fong is a 52 year old male who presents to clinic today for the following health issues:    Hypertension Follow-up      Outpatient blood pressures are being checked at [facility].  Results are 116/70.    Low Salt Diet: no added salt    Amount of exercise or physical activity: 4-5 days/week for an average of 45-60 minutes    Problems taking medications regularly: No    Medication side effects: none    Diet: carbohydrate counting and high protein     Whenever he goes to the [facility] to exercise, they check his BP after a workout. He reports that his last BP reading there was 116/70. Patient's BP today is elevated, 160/86. He notes that it may be because of white coat syndrome, although past BP readings at the clinic have been within normal. He is currently on lisinopril 40 mg and 100 mg of metoprolol. He used to be on lisinopril and HCTZ a few years ago, but switched medications. He was on lasix in the past after heart surgery and due to fluid in the lungs (2014). He did faint while on the medication combination of lasix, metoprolol, HCTZ and lisinopril, hence the medication switch. He denies chest pain, SOB or unexpected weight gain.    He exercises every day. He took his BP reading today around 8:30 am.     Diabetes  His blood glucose levels have been between 113-130 mg/dL. Eating well. Taking meds without fail.    Other problems to add on...  He has been taking a digestive advantage probiotic. Has been taking it for a couple of weeks.     Problem list and histories reviewed & adjusted, as indicated.  Additional history: as documented    Recent Labs   Lab Test  08/17/17   1034  08/02/17   0830  02/10/17   0839  08/17/16   0849  03/15/16   0931   A1C   --   6.3*  6.2*  7.2*   --    LDL   --   105*  119*  117*  109*   HDL   --   33*  35*  32*  33*   TRIG   --   199*  122  147  195*   ALT  29   --   39   --   34   CR  1.08   --   1.10   --   1.17   GFRESTIMATED  71   --   70   --  "  65   GFRESTBLACK  86   --   85   --   79   POTASSIUM  4.3   --   4.1   --   4.4   TSH  2.30   --    --    --   2.99      BP Readings from Last 3 Encounters:   01/16/18 160/86   12/11/17 170/80   11/27/17 165/80    Wt Readings from Last 3 Encounters:   01/16/18 107.5 kg (237 lb 1.6 oz)   12/11/17 106.7 kg (235 lb 4.8 oz)   08/17/17 107.5 kg (236 lb 14.4 oz)         Labs reviewed in EPIC    Reviewed and updated as needed this visit by clinical staffTobacco  Allergies  Meds  Problems       Reviewed and updated as needed this visit by Provider        ROS:  Constitutional, HEENT, cardiovascular, pulmonary, gi and gu systems are negative, except as otherwise noted.    This document serves as a record of the services and decisions personally performed and made by Janis Marcial MD. It was created on her behalf by Mireya Banks, a trained medical scribe. The creation of this document is based on the provider's statements to the medical scribe.  Mireya Bakns January 16, 2018 10:23 AM     OBJECTIVE:     /86 (BP Location: Right arm, Patient Position: Chair, Cuff Size: Adult Regular)  Pulse 64  Temp 97.9  F (36.6  C) (Oral)  Resp 16  Ht 1.93 m (6' 4\")  Wt 107.5 kg (237 lb 1.6 oz)  BMI 28.86 kg/m2  Body mass index is 28.86 kg/(m^2).    GENERAL: healthy, alert and no distress  EYES: Eyes grossly normal to inspection,  and conjunctivae and sclerae normal  RESP: lungs clear to auscultation - no rales, rhonchi or wheezes  CV: regular rate and rhythm, normal S1 S2, no S3 or S4, no murmur, click or rub, no peripheral edema and peripheral pulses strong  MS: no gross musculoskeletal defects noted, no edema  SKIN: no suspicious lesions or rashes  NEURO: Normal strength and tone, mentation intact and speech normal  PSYCH: mentation appears normal, affect normal/bright    ASSESSMENT/PLAN:   (E11.8) Controlled type 2 diabetes mellitus with complication, without long-term current use of insulin (H)  (primary encounter " diagnosis)  Comment: controlled, Labs performed today, diet discussed and exercise, cont same meds  Plan: HEMOGLOBIN A1C, Comprehensive metabolic panel          (I10) HTN, goal below 140/90  Comment: BP elevated, 160/86. Discussed adding a low dose diuretic. Patient is in agreement. Will start HCTZ 12.5 mg, discussed possible side effects.  Refilled lisinopril. Recheck BP in 2 weeks with nurse only appointment.   Plan: lisinopril (PRINIVIL/ZESTRIL) 40 MG tablet,         hydrochlorothiazide (MICROZIDE) 12.5 MG         capsule, Comprehensive metabolic panel          Work on weight loss  Regular exercise    The information in this document, created by the medical scribe for me, accurately reflects the services I personally performed and the decisions made by me. I have reviewed and approved this document for accuracy prior to leaving the patient care area.  January 16, 2018 10:23 AM    Janis Marcial MD  [facility]      Physical Exam

## 2018-01-17 LAB
ALBUMIN SERPL-MCNC: 4.3 G/DL (ref 3.4–5)
ALP SERPL-CCNC: 57 U/L (ref 40–150)
ALT SERPL W P-5'-P-CCNC: 28 U/L (ref 0–70)
ANION GAP SERPL CALCULATED.3IONS-SCNC: 7 MMOL/L (ref 3–14)
AST SERPL W P-5'-P-CCNC: 23 U/L (ref 0–45)
BILIRUB SERPL-MCNC: 0.4 MG/DL (ref 0.2–1.3)
BUN SERPL-MCNC: 29 MG/DL (ref 7–30)
CALCIUM SERPL-MCNC: 9 MG/DL (ref 8.5–10.1)
CHLORIDE SERPL-SCNC: 107 MMOL/L (ref 94–109)
CO2 SERPL-SCNC: 26 MMOL/L (ref 20–32)
CREAT SERPL-MCNC: 1.03 MG/DL (ref 0.66–1.25)
GFR SERPL CREATININE-BSD FRML MDRD: 75 ML/MIN/1.7M2
GLUCOSE SERPL-MCNC: 155 MG/DL (ref 70–99)
POTASSIUM SERPL-SCNC: 4.4 MMOL/L (ref 3.4–5.3)
PROT SERPL-MCNC: 6.8 G/DL (ref 6.8–8.8)
SODIUM SERPL-SCNC: 140 MMOL/L (ref 133–144)

## 2018-01-29 ENCOUNTER — ALLIED HEALTH/NURSE VISIT (OUTPATIENT)
Dept: NURSING | Facility: CLINIC | Age: 53
End: 2018-01-29
Payer: COMMERCIAL

## 2018-01-29 VITALS — DIASTOLIC BLOOD PRESSURE: 80 MMHG | SYSTOLIC BLOOD PRESSURE: 138 MMHG

## 2018-01-29 DIAGNOSIS — Z01.30 BP CHECK: Primary | ICD-10-CM

## 2018-01-29 PROCEDURE — 99207 ZZC NO CHARGE NURSE ONLY: CPT

## 2018-03-05 DIAGNOSIS — E11.8 CONTROLLED TYPE 2 DIABETES MELLITUS WITH COMPLICATION, WITHOUT LONG-TERM CURRENT USE OF INSULIN (H): ICD-10-CM

## 2018-03-05 DIAGNOSIS — I10 ESSENTIAL HYPERTENSION, BENIGN: ICD-10-CM

## 2018-03-05 DIAGNOSIS — E78.5 HYPERLIPIDEMIA LDL GOAL <100: ICD-10-CM

## 2018-03-05 DIAGNOSIS — K57.30 DIVERTICULOSIS OF LARGE INTESTINE: ICD-10-CM

## 2018-03-05 DIAGNOSIS — F33.42 MAJOR DEPRESSIVE DISORDER, RECURRENT EPISODE, IN FULL REMISSION (H): ICD-10-CM

## 2018-03-06 ASSESSMENT — ANXIETY QUESTIONNAIRES
2. NOT BEING ABLE TO STOP OR CONTROL WORRYING: NOT AT ALL
5. BEING SO RESTLESS THAT IT IS HARD TO SIT STILL: NOT AT ALL
6. BECOMING EASILY ANNOYED OR IRRITABLE: NOT AT ALL
7. FEELING AFRAID AS IF SOMETHING AWFUL MIGHT HAPPEN: NOT AT ALL
IF YOU CHECKED OFF ANY PROBLEMS ON THIS QUESTIONNAIRE, HOW DIFFICULT HAVE THESE PROBLEMS MADE IT FOR YOU TO DO YOUR WORK, TAKE CARE OF THINGS AT HOME, OR GET ALONG WITH OTHER PEOPLE: NOT DIFFICULT AT ALL
1. FEELING NERVOUS, ANXIOUS, OR ON EDGE: NOT AT ALL
3. WORRYING TOO MUCH ABOUT DIFFERENT THINGS: NOT AT ALL
GAD7 TOTAL SCORE: 0

## 2018-03-06 ASSESSMENT — PATIENT HEALTH QUESTIONNAIRE - PHQ9: 5. POOR APPETITE OR OVEREATING: NOT AT ALL

## 2018-03-06 NOTE — TELEPHONE ENCOUNTER
"Requested Prescriptions   Pending Prescriptions Disp Refills     PARoxetine (PAXIL) 20 MG tablet [Pharmacy Med Name: PAROXETINE HCL 20 MG Tablet] 90 tablet 1    Last Written Prescription Date:  8/17/17  Last Fill Quantity: 90,  # refills: 1   Last Office Visit: 1/16/2018   Future Office Visit:      Sig: TAKE 1 TABLET AT BEDTIME    SSRIs Protocol Failed    3/5/2018 10:07 PM       Failed - PHQ-9 score less than 5 in past 6 months    PHQ-9 SCORE 5/25/2016 10/31/2016 8/17/2017   Total Score - - -   Total Score MyChart - - -   Total Score 0 0 0     MCKENNA-7 SCORE 5/25/2016 10/31/2016 8/17/2017   Total Score - - -   Total Score 0 0 0            Passed - Patient is age 18 or older       Passed - Recent (6 mo) or future (30 days) visit within the authorizing provider's specialty    Patient had office visit in the last 6 months or has a visit in the next 30 days with authorizing provider.  See \"Patient Info\" tab in [employer], or \"Choose Columns\" in Meds & Orders section of the refill encounter.       ________________________________________________________________________________       metoprolol succinate (TOPROL-XL) 100 MG 24 hr tablet [Pharmacy Med Name: METOPROLOL SUCCINATE  MG Tablet Extended Release 24 Hour] 90 tablet 1    Last Written Prescription Date:  8/17/17  Last Fill Quantity: 90,  # refills: 1   Last Office Visit: 1/16/2018   Future Office Visit:      Sig: TAKE 1 TABLET EVERY DAY    Beta-Blockers Protocol Passed    3/5/2018 10:07 PM       Passed - Blood pressure under 140/90 in past 12 months    BP Readings from Last 3 Encounters:   01/29/18 138/80   01/16/18 144/80   12/11/17 170/80          Passed - Patient is age 6 or older       Passed - Recent (12 mo) or future (30 days) visit within the authorizing provider's specialty    Patient had office visit in the last year or has a visit in the next 30 days with authorizing provider.  See \"Patient Info\" tab in inbasket, or \"Choose Columns\" in Meds & Orders " "section of the refill encounter.        ________________________________________________________________________________       metFORMIN (GLUCOPHAGE) 500 MG tablet [Pharmacy Med Name: METFORMIN  MG Tablet] 180 tablet 1    Last Written Prescription Date:  8/17/17  Last Fill Quantity: 180,  # refills: 1   Last Office Visit: 1/16/2018   Future Office Visit:      Sig: TAKE 1 TABLET TWICE DAILY WITH MEALS    Biguanide Agents Passed    3/5/2018 10:07 PM       Passed - Blood pressure less than 140/90 in past 6 months    BP Readings from Last 3 Encounters:   01/29/18 138/80   01/16/18 144/80   12/11/17 170/80          Passed - Patient has documented LDL within the past 12 mos.    Recent Labs   Lab Test  08/02/17   0830   LDL  105*          Passed - Patient has had a Microalbumin in the past 12 mos.    Recent Labs   Lab Test  08/17/17   1057   MICROL  8   UMALCR  3.84          Passed - Patient is age 10 or older       Passed - Patient has documented A1c within the specified period of time.    Recent Labs   Lab Test  01/16/18   1039   A1C  6.6*          Passed - Patient's CR is NOT>1.4 OR Patient's EGFR is NOT<45 within past 12 mos.    Recent Labs   Lab Test  01/16/18   1039   GFRESTIMATED  75   GFRESTBLACK  >90     Recent Labs   Lab Test  01/16/18   1039   CR  1.03          Passed - Patient does NOT have a diagnosis of CHF.       Passed - Recent (6 mo) or future (30 days) visit within the authorizing provider's specialty    Patient had office visit in the last 6 months or has a visit in the next 30 days with authorizing provider.  See \"Patient Info\" tab in inbasket, or \"Choose Columns\" in Meds & Orders section of the refill encounter.       ________________________________________________________________________________       polyethylene glycol (MIRALAX/GLYCOLAX) powder [Pharmacy Med Name: POLYETHYLENE GLYCOL 3350   Powder] 1054 g 3    Last Written Prescription Date:  6/8/17  Last Fill Quantity: 1054g  ,  # refills: " "3   Last Office Visit: 1/16/2018   Future Office Visit:      Sig: MIX 1/2 CAPFUL IN 4 OUNCES OF LIQUID AND DRINK DAILY AS NEEDED    Laxatives Protocol Passed    3/5/2018 10:07 PM       Passed - Patient is age 6 or older       Passed - Recent (12 mo) or future (30 days) visit within the authorizing provider's specialty    Patient had office visit in the last year or has a visit in the next 30 days with authorizing provider.  See \"Patient Info\" tab in inbasket, or \"Choose Columns\" in Meds & Orders section of the refill encounter.    ________________________________________________________________________________       fenofibrate 160 MG tablet [Pharmacy Med Name: FENOFIBRATE 160 MG Tablet] 90 tablet 1    Last Written Prescription Date:  8/17/17  Last Fill Quantity: 90,  # refills: 1   Last Office Visit: 1/16/2018   Future Office Visit:      Sig: TAKE 1 TABLET EVERY DAY    Fibrates Passed    3/5/2018 10:07 PM       Passed - Lipid panel on file in past 12 months    Recent Labs   Lab Test  08/02/17   0830   09/10/15   0814   CHOL  178   < >  155   TRIG  199*   < >  168*   HDL  33*   < >  33*   LDL  105*   < >  88   NHDL  145*   < >   --    VLDL   --    --   34*   CHOLHDLRATIO   --    --   4.7    < > = values in this interval not displayed.          Passed - No abnormal creatine kinase in past 12 months    No lab results found.         Passed - Recent (12 mo) or future (30 days) visit within the authorizing provider's specialty    Patient had office visit in the last year or has a visit in the next 30 days with authorizing provider.  See \"Patient Info\" tab in inbasket, or \"Choose Columns\" in Meds & Orders section of the refill encounter.        Passed - Patient is age 18 or older          "

## 2018-03-06 NOTE — TELEPHONE ENCOUNTER
OV 1.16.18  (I10) HTN, goal below 140/90  Comment: BP elevated, 160/86. Discussed adding a low dose diuretic. Patient is in agreement. Will start HCTZ 12.5 mg, discussed possible side effects.  Refilled lisinopril. Recheck BP in 2 weeks with nurse only appointment.   Plan: lisinopril (PRINIVIL/ZESTRIL) 40 MG tablet,         hydrochlorothiazide (MICROZIDE) 12.5 MG         capsule, Comprehensive metabolic panel    BP Readings from Last 3 Encounters:   01/29/18 138/80   01/16/18 144/80   12/11/17 170/80     LM to CB, needs updated PHQ 9  Also sent PHQ 9, [facility]NA [facility]    Briseida Palacio RN, BS  Clinical Nurse Triage.

## 2018-03-06 NOTE — TELEPHONE ENCOUNTER
Pt returned call, given message below. PHQ & MCKENNA completed Score of 0 for both.    Nolan Fernandez   03/06/18 4:02 PM

## 2018-03-06 NOTE — TELEPHONE ENCOUNTER
PHQ-9 SCORE 10/31/2016 8/17/2017 3/6/2018   Total Score - - -   Total Score MyChart - - -   Total Score 0 0 0     MCKENNA-7 SCORE 10/31/2016 8/17/2017 3/6/2018   Total Score - - -   Total Score 0 0 0       Routing refill request to provider for review/approval because:  Drug not on the [facility] refill protocol, Miralax  BP rechecked after last OV, /80 (down slightly)    Briseida Palacio RN, BS  Clinical Nurse Triage.

## 2018-03-07 RX ORDER — POLYETHYLENE GLYCOL 3350 17 G/17G
POWDER, FOR SOLUTION ORAL
Qty: 1054 G | Refills: 3 | Status: SHIPPED | OUTPATIENT
Start: 2018-03-07 | End: 2018-07-17

## 2018-03-07 RX ORDER — METOPROLOL SUCCINATE 100 MG/1
TABLET, EXTENDED RELEASE ORAL
Qty: 90 TABLET | Refills: 1 | Status: SHIPPED | OUTPATIENT
Start: 2018-03-07 | End: 2018-07-17

## 2018-03-07 RX ORDER — PAROXETINE 20 MG/1
TABLET, FILM COATED ORAL
Qty: 90 TABLET | Refills: 1 | Status: SHIPPED | OUTPATIENT
Start: 2018-03-07 | End: 2018-07-17

## 2018-03-07 RX ORDER — FENOFIBRATE 160 MG/1
TABLET ORAL
Qty: 90 TABLET | Refills: 1 | Status: SHIPPED | OUTPATIENT
Start: 2018-03-07 | End: 2018-07-17

## 2018-03-07 ASSESSMENT — PATIENT HEALTH QUESTIONNAIRE - PHQ9: SUM OF ALL RESPONSES TO PHQ QUESTIONS 1-9: 0

## 2018-03-07 ASSESSMENT — ANXIETY QUESTIONNAIRES: GAD7 TOTAL SCORE: 0

## 2018-03-19 ENCOUNTER — DOCUMENTATION ONLY (OUTPATIENT)
Dept: LAB | Facility: CLINIC | Age: 53
End: 2018-03-19

## 2018-03-19 DIAGNOSIS — E11.8 CONTROLLED TYPE 2 DIABETES MELLITUS WITH COMPLICATION, WITHOUT LONG-TERM CURRENT USE OF INSULIN (H): ICD-10-CM

## 2018-03-19 DIAGNOSIS — I10 HTN, GOAL BELOW 140/90: ICD-10-CM

## 2018-03-19 DIAGNOSIS — E11.8 CONTROLLED TYPE 2 DIABETES MELLITUS WITH COMPLICATION, WITHOUT LONG-TERM CURRENT USE OF INSULIN (H): Primary | ICD-10-CM

## 2018-03-19 LAB
ALBUMIN SERPL-MCNC: 4.1 G/DL (ref 3.4–5)
ALP SERPL-CCNC: 57 U/L (ref 40–150)
ALT SERPL W P-5'-P-CCNC: 32 U/L (ref 0–70)
ANION GAP SERPL CALCULATED.3IONS-SCNC: 4 MMOL/L (ref 3–14)
AST SERPL W P-5'-P-CCNC: 27 U/L (ref 0–45)
BILIRUB SERPL-MCNC: 0.4 MG/DL (ref 0.2–1.3)
BUN SERPL-MCNC: 25 MG/DL (ref 7–30)
CALCIUM SERPL-MCNC: 9 MG/DL (ref 8.5–10.1)
CHLORIDE SERPL-SCNC: 107 MMOL/L (ref 94–109)
CO2 SERPL-SCNC: 28 MMOL/L (ref 20–32)
CREAT SERPL-MCNC: 1.17 MG/DL (ref 0.66–1.25)
ERYTHROCYTE [DISTWIDTH] IN BLOOD BY AUTOMATED COUNT: 12.8 % (ref 10–15)
GFR SERPL CREATININE-BSD FRML MDRD: 65 ML/MIN/1.7M2
GLUCOSE SERPL-MCNC: 116 MG/DL (ref 70–99)
HBA1C MFR BLD: 6.6 % (ref 4.3–6)
HCT VFR BLD AUTO: 38.6 % (ref 40–53)
HGB BLD-MCNC: 12.8 G/DL (ref 13.3–17.7)
MCH RBC QN AUTO: 30.5 PG (ref 26.5–33)
MCHC RBC AUTO-ENTMCNC: 33.2 G/DL (ref 31.5–36.5)
MCV RBC AUTO: 92 FL (ref 78–100)
PLATELET # BLD AUTO: 197 10E9/L (ref 150–450)
POTASSIUM SERPL-SCNC: 4.2 MMOL/L (ref 3.4–5.3)
PROT SERPL-MCNC: 6.8 G/DL (ref 6.8–8.8)
RBC # BLD AUTO: 4.2 10E12/L (ref 4.4–5.9)
SODIUM SERPL-SCNC: 139 MMOL/L (ref 133–144)
WBC # BLD AUTO: 5.6 10E9/L (ref 4–11)

## 2018-03-19 PROCEDURE — 80053 COMPREHEN METABOLIC PANEL: CPT | Performed by: FAMILY MEDICINE

## 2018-03-19 PROCEDURE — 85027 COMPLETE CBC AUTOMATED: CPT | Performed by: FAMILY MEDICINE

## 2018-03-19 PROCEDURE — 36415 COLL VENOUS BLD VENIPUNCTURE: CPT | Performed by: FAMILY MEDICINE

## 2018-03-19 PROCEDURE — 83036 HEMOGLOBIN GLYCOSYLATED A1C: CPT | Performed by: FAMILY MEDICINE

## 2018-03-20 ENCOUNTER — TRANSFERRED RECORDS (OUTPATIENT)
Dept: HEALTH INFORMATION MANAGEMENT | Facility: CLINIC | Age: 53
End: 2018-03-20

## 2018-04-23 ENCOUNTER — TRANSFERRED RECORDS (OUTPATIENT)
Dept: HEALTH INFORMATION MANAGEMENT | Facility: CLINIC | Age: 53
End: 2018-04-23

## 2018-04-28 DIAGNOSIS — B35.3 TINEA PEDIS OF BOTH FEET: ICD-10-CM

## 2018-04-30 RX ORDER — CLOTRIMAZOLE 1 %
CREAM (GRAM) TOPICAL
Qty: 60 G | Refills: 3 | Status: SHIPPED | OUTPATIENT
Start: 2018-04-30 | End: 2018-07-17

## 2018-04-30 NOTE — TELEPHONE ENCOUNTER
Routing refill request to provider for review/approval because:  Drug not on the [facility] refill protocol     Briseida Palacio RN, BS  Clinical Nurse Triage.

## 2018-04-30 NOTE — TELEPHONE ENCOUNTER
Requested Prescriptions   Pending Prescriptions Disp Refills     clotrimazole (LOTRIMIN) 1 % cream [Pharmacy Med Name: CLOTRIMAZOLE 1 % Cream] 60 g 3     Sig: APPLY TOPICALLY 2 TIMES DAILY    There is no refill protocol information for this order        Last Written Prescription Date:  8/17/17  Last Fill Quantity: 60g,  # refills: 3   Last Office Visit: 1/16/2018   Future Office Visit:

## 2018-05-11 ENCOUNTER — TRANSFERRED RECORDS (OUTPATIENT)
Dept: HEALTH INFORMATION MANAGEMENT | Facility: CLINIC | Age: 53
End: 2018-05-11

## 2018-06-15 ENCOUNTER — TRANSFERRED RECORDS (OUTPATIENT)
Dept: HEALTH INFORMATION MANAGEMENT | Facility: CLINIC | Age: 53
End: 2018-06-15

## 2018-06-23 DIAGNOSIS — I10 HTN, GOAL BELOW 140/90: ICD-10-CM

## 2018-06-25 RX ORDER — LISINOPRIL 40 MG/1
TABLET ORAL
Qty: 90 TABLET | Refills: 1 | Status: SHIPPED | OUTPATIENT
Start: 2018-06-25 | End: 2018-12-09

## 2018-06-25 NOTE — TELEPHONE ENCOUNTER
Prescription approved per [facility] Refill Protocol  Briseida Palacio RN BS

## 2018-06-25 NOTE — TELEPHONE ENCOUNTER
"Requested Prescriptions   Pending Prescriptions Disp Refills     lisinopril (PRINIVIL/ZESTRIL) 40 MG tablet [Pharmacy Med Name: LISINOPRIL 40 MG Tablet] 90 tablet 1    Last Written Prescription Date:  1/16/18  Last Fill Quantity: 90,  # refills: 1   Last Office Visit: 1/16/2018   Future Office Visit:      Sig: TAKE 1 TABLET EVERY DAY    ACE Inhibitors (Including Combos) Protocol Passed    6/23/2018  4:06 PM       Passed - Blood pressure under 140/90 in past 12 months    BP Readings from Last 3 Encounters:   01/29/18 138/80   01/16/18 144/80   12/11/17 170/80                Passed - Recent (12 mo) or future (30 days) visit within the authorizing provider's specialty    Patient had office visit in the last 12 months or has a visit in the next 30 days with authorizing provider or within the authorizing provider's specialty.  See \"Patient Info\" tab in inbasket, or \"Choose Columns\" in Meds & Orders section of the refill encounter.           Passed - Patient is age 18 or older       Passed - Normal serum creatinine on file in past 12 months    Recent Labs   Lab Test  03/19/18   0812   CR  1.17            Passed - Normal serum potassium on file in past 12 months    Recent Labs   Lab Test  03/19/18   0812   POTASSIUM  4.2               "

## 2018-07-17 ENCOUNTER — MYC REFILL (OUTPATIENT)
Dept: FAMILY MEDICINE | Facility: CLINIC | Age: 53
End: 2018-07-17

## 2018-07-17 DIAGNOSIS — E78.5 HYPERLIPIDEMIA LDL GOAL <100: ICD-10-CM

## 2018-07-17 DIAGNOSIS — E11.9 TYPE 2 DIABETES, HBA1C GOAL < 8% (H): ICD-10-CM

## 2018-07-17 DIAGNOSIS — B35.3 TINEA PEDIS OF BOTH FEET: ICD-10-CM

## 2018-07-17 DIAGNOSIS — F33.42 MAJOR DEPRESSIVE DISORDER, RECURRENT EPISODE, IN FULL REMISSION (H): ICD-10-CM

## 2018-07-17 DIAGNOSIS — E11.8 CONTROLLED TYPE 2 DIABETES MELLITUS WITH COMPLICATION, WITHOUT LONG-TERM CURRENT USE OF INSULIN (H): ICD-10-CM

## 2018-07-17 DIAGNOSIS — K21.9 GASTROESOPHAGEAL REFLUX DISEASE WITHOUT ESOPHAGITIS: ICD-10-CM

## 2018-07-17 DIAGNOSIS — I10 HTN, GOAL BELOW 140/90: ICD-10-CM

## 2018-07-17 DIAGNOSIS — I10 ESSENTIAL HYPERTENSION, BENIGN: ICD-10-CM

## 2018-07-17 RX ORDER — LISINOPRIL 40 MG/1
TABLET ORAL
Qty: 90 TABLET | Refills: 1 | Status: CANCELLED | OUTPATIENT
Start: 2018-07-17

## 2018-07-18 DIAGNOSIS — E78.5 HYPERLIPIDEMIA LDL GOAL <100: ICD-10-CM

## 2018-07-18 DIAGNOSIS — E11.8 CONTROLLED TYPE 2 DIABETES MELLITUS WITH COMPLICATION, WITHOUT LONG-TERM CURRENT USE OF INSULIN (H): Primary | ICD-10-CM

## 2018-07-18 NOTE — TELEPHONE ENCOUNTER
Message from [facility]:  Original authorizing provider: MD Edd Jay would like a refill of the following medications:  Blood Glucose Monitoring Suppl (BLOOD GLUCOSE METER) KIT [Janis Marcial MD]  blood glucose monitoring (ONE TOUCH ULTRA) test strip [Janis Marcial MD]    Preferred pharmacy: [facility] [address]    Comment:

## 2018-07-18 NOTE — TELEPHONE ENCOUNTER
Message from MyChart:  Original authorizing provider: MD Edd Jay would like a refill of the following medications:  omeprazole (PRILOSEC) 40 MG capsule [Janis Marcial MD]  atorvastatin (LIPITOR) 80 MG tablet [Janis Marcial MD]  PARoxetine (PAXIL) 20 MG tablet [Janis Marcial MD]  metoprolol succinate (TOPROL-XL) 100 MG 24 hr tablet [Janis Marcial MD]  polyethylene glycol (MIRALAX/GLYCOLAX) powder [Janis Marcial MD]  fenofibrate 160 MG tablet [Janis Marcial MD]  clotrimazole (LOTRIMIN) 1 % cream [Janis Marcial MD]  hydrochlorothiazide (MICROZIDE) 12.5 MG capsule [Janis Marcial MD]  metFORMIN (GLUCOPHAGE) 500 MG tablet [Janis Marcial MD]  lisinopril (PRINIVIL/ZESTRIL) 40 MG tablet [Janis Marcial MD]    Preferred pharmacy: [facility] [address]    Comment:

## 2018-07-18 NOTE — TELEPHONE ENCOUNTER
"Requested Prescriptions   Pending Prescriptions Disp Refills     atorvastatin (LIPITOR) 80 MG tablet [Pharmacy Med Name: ATORVASTATIN CALCIUM 80 MG Tablet] 90 tablet 1    Last Written Prescription Date:  8/17/17  Last Fill Quantity: 90,  # refills: 1   Last Office Visit: 1/16/2018   Future Office Visit:      Sig: TAKE 1 TABLET EVERY DAY    Statins Protocol Passed    7/18/2018  2:44 AM       Passed - LDL on file in past 12 months    Recent Labs   Lab Test  08/02/17   0830   LDL  105*            Passed - No abnormal creatine kinase in past 12 months    No lab results found.            Passed - Recent (12 mo) or future (30 days) visit within the authorizing provider's specialty    Patient had office visit in the last 12 months or has a visit in the next 30 days with authorizing provider or within the authorizing provider's specialty.  See \"Patient Info\" tab in inbasket, or \"Choose Columns\" in Meds & Orders section of the refill encounter.           Passed - Patient is age 18 or older          "

## 2018-07-18 NOTE — TELEPHONE ENCOUNTER
"Requested Prescriptions   Pending Prescriptions Disp Refills     blood glucose monitoring (NO BRAND SPECIFIED) meter device kit 1 kit 0    Last Written Prescription Date:  3/7/12  Last Fill Quantity: 1 kit,  # refills: 0   Last Office Visit: 2018   Future Office Visit:      Si kit continuous    Diabetic Supplies Protocol Failed    2018 10:12 AM       Failed - Recent (6 mo) or future (30 days) visit within the authorizing provider's specialty    Patient had office visit in the last 6 months or has a visit in the next 30 days with authorizing provider.  See \"Patient Info\" tab in inbasket, or \"Choose Columns\" in Meds & Orders section of the refill encounter.           Passed - Patient is 18 years of age or older   _________________________________________________________________________________________________________________________       blood glucose monitoring (ONETOUCH ULTRA) test strip 3 Box 1    Last Written Prescription Date:  18  Last Fill Quantity: 50,  # refills: 3   Last Office Visit: 2018   Future Office Visit:      Sig: Use to test blood sugars 1 times daily or as directed.    Diabetic Supplies Protocol Failed    2018 10:12 AM       Failed - Recent (6 mo) or future (30 days) visit within the authorizing provider's specialty    Patient had office visit in the last 6 months or has a visit in the next 30 days with authorizing provider.  See \"Patient Info\" tab in inbasket, or \"Choose Columns\" in Meds & Orders section of the refill encounter.           Passed - Patient is 18 years of age or older          "

## 2018-07-18 NOTE — TELEPHONE ENCOUNTER
"Requested Prescriptions   Pending Prescriptions Disp Refills     omeprazole (PRILOSEC) 40 MG capsule 90 capsule 1    Last Written Prescription Date:  8/17/17  Last Fill Quantity: 90,  # refills: 1   Last Office Visit: 1/16/2018   Future Office Visit:      Sig: TAKE 1 CAPSULE EVERY DAY 30 - 60 MINUTES BEFORE A MEAL    PPI Protocol Passed    7/18/2018 10:12 AM       Passed - Not on Clopidogrel (unless Pantoprazole ordered)       Passed - No diagnosis of osteoporosis on record       Passed - Recent (12 mo) or future (30 days) visit within the authorizing provider's specialty    Patient had office visit in the last 12 months or has a visit in the next 30 days with authorizing provider or within the authorizing provider's specialty.  See \"Patient Info\" tab in [employer], or \"Choose Columns\" in Meds & Orders section of the refill encounter.           Passed - Patient is age 18 or older   _________________________________________________________________________________________________________________________       atorvastatin (LIPITOR) 80 MG tablet 90 tablet 1    Last Written Prescription Date:  8/17/17  Last Fill Quantity: 90,  # refills: 1   Last Office Visit: 1/16/2018   Future Office Visit:      Sig: Take 1 tablet (80 mg) by mouth daily    Statins Protocol Passed    7/18/2018 10:12 AM       Passed - LDL on file in past 12 months    Recent Labs   Lab Test  08/02/17   0830   LDL  105*            Passed - No abnormal creatine kinase in past 12 months    No lab results found.            Passed - Recent (12 mo) or future (30 days) visit within the authorizing provider's specialty    Patient had office visit in the last 12 months or has a visit in the next 30 days with authorizing provider or within the authorizing provider's specialty.  See \"Patient Info\" tab in [employer], or \"Choose Columns\" in Meds & Orders section of the refill encounter.           Passed - Patient is age 18 or older " "  _________________________________________________________________________________________________________________________       PARoxetine (PAXIL) 20 MG tablet 90 tablet 1    Last Written Prescription Date:  3/7/18  Last Fill Quantity: 90,  # refills: 1   Last Office Visit: 1/16/2018   Future Office Visit:      Sig: Take 1 tablet (20 mg) by mouth At Bedtime    SSRIs Protocol Failed    7/18/2018 10:12 AM       Failed - Recent (6 mo) or future (30 days) visit within the authorizing provider's specialty    Patient had office visit in the last 6 months or has a visit in the next 30 days with authorizing provider or within the authorizing provider's specialty.  See \"Patient Info\" tab in inbasket, or \"Choose Columns\" in Meds & Orders section of the refill encounter.           Passed - PHQ-9 score less than 5 in past 6 months    PHQ-9 SCORE 8/17/2017 3/6/2018 3/17/2018   Total Score - - -   Total Score MyChart - - 0   Total Score 0 0 0     MCKENNA-7 SCORE 8/17/2017 3/6/2018 3/17/2018   Total Score - - -   Total Score - - 0 (minimal anxiety)   Total Score 0 0 0              Passed - Patient is age 18 or older   _________________________________________________________________________________________________________________________       metoprolol succinate (TOPROL-XL) 100 MG 24 hr tablet  Last Written Prescription Date:  3/7/18  Last Fill Quantity: 90,  # refills: 1   Last Office Visit: 1/16/2018   Future Office Visit:      90 tablet 1    Beta-Blockers Protocol Passed    7/18/2018 10:12 AM       Passed - Blood pressure under 140/90 in past 12 months    BP Readings from Last 3 Encounters:   01/29/18 138/80   01/16/18 144/80   12/11/17 170/80                Passed - Patient is age 6 or older       Passed - Recent (12 mo) or future (30 days) visit within the authorizing provider's specialty    Patient had office visit in the last 12 months or has a visit in the next 30 days with authorizing provider or within the authorizing " "provider's specialty.  See \"Patient Info\" tab in inbasket, or \"Choose Columns\" in Meds & Orders section of the refill encounter.       _________________________________________________________________________________________________________________________       polyethylene glycol (MIRALAX/GLYCOLAX) powder  Last Written Prescription Date:  3/7/18  Last Fill Quantity: 1054 g   ,  # refills: 3   Last Office Visit: 1/16/2018   Future Office Visit:      1054 g 3    Laxatives Protocol Passed    7/18/2018 10:12 AM       Passed - Patient is age 6 or older       Passed - Recent (12 mo) or future (30 days) visit within the authorizing provider's specialty    Patient had office visit in the last 12 months or has a visit in the next 30 days with authorizing provider or within the authorizing provider's specialty.  See \"Patient Info\" tab in inbasket, or \"Choose Columns\" in Meds & Orders section of the refill encounter.       _________________________________________________________________________________________________________________________       fenofibrate 160 MG tablet  Last Written Prescription Date:  3/7/18  Last Fill Quantity: 90,  # refills: 1   Last Office Visit: 1/16/2018   Future Office Visit:      90 tablet 1    Fibrates Passed    7/18/2018 10:12 AM       Passed - Lipid panel on file in past 12 months    Recent Labs   Lab Test  08/02/17   0830   09/10/15   0814   CHOL  178   < >  155   TRIG  199*   < >  168*   HDL  33*   < >  33*   LDL  105*   < >  88   NHDL  145*   < >   --    VLDL   --    --   34*   CHOLHDLRATIO   --    --   4.7    < > = values in this interval not displayed.              Passed - No abnormal creatine kinase in past 12 months    No lab results found.            Passed - Recent (12 mo) or future (30 days) visit within the authorizing provider's specialty    Patient had office visit in the last 12 months or has a visit in the next 30 days with authorizing provider or within the authorizing " "provider's specialty.  See \"Patient Info\" tab in inbasket, or \"Choose Columns\" in Meds & Orders section of the refill encounter.           Passed - Patient is age 18 or older   _________________________________________________________________________________________________________________________       clotrimazole (LOTRIMIN) 1 % cream 60 g 3    There is no refill protocol information for this order   Last Written Prescription Date:  4/30/18  Last Fill Quantity: 60g   ,  # refills: 3   Last Office Visit: 1/16/2018   Future Office Visit:       _________________________________________________________________________________________________________________________       hydrochlorothiazide (MICROZIDE) 12.5 MG capsule 90 capsule 1    Last Written Prescription Date:  6/4/18  Last Fill Quantity: 90,  # refills: 1   Last Office Visit: 1/16/2018   Future Office Visit:      Sig: Take 1 capsule (12.5 mg) by mouth daily    Diuretics (Including Combos) Protocol Passed    7/18/2018 10:12 AM       Passed - Blood pressure under 140/90 in past 12 months    BP Readings from Last 3 Encounters:   01/29/18 138/80   01/16/18 144/80   12/11/17 170/80                Passed - Recent (12 mo) or future (30 days) visit within the authorizing provider's specialty    Patient had office visit in the last 12 months or has a visit in the next 30 days with authorizing provider or within the authorizing provider's specialty.  See \"Patient Info\" tab in inbasket, or \"Choose Columns\" in Meds & Orders section of the refill encounter.           Passed - Patient is age 18 or older       Passed - Normal serum creatinine on file in past 12 months    Recent Labs   Lab Test  03/19/18   0812   CR  1.17             Passed - Normal serum potassium on file in past 12 months    Recent Labs   Lab Test  03/19/18   0812   POTASSIUM  4.2                   Passed - Normal serum sodium on file in past 12 months    Recent Labs   Lab Test  03/19/18   0812   NA  139    " "     _________________________________________________________________________________________________________________________       metFORMIN (GLUCOPHAGE) 500 MG tablet  Last Written Prescription Date:  6/15/18  Last Fill Quantity: 180,  # refills: 0   Last Office Visit: 1/16/2018   Future Office Visit:      180 tablet 0    Biguanide Agents Failed    7/18/2018 10:12 AM       Failed - Recent (6 mo) or future (30 days) visit within the authorizing provider's specialty    Patient had office visit in the last 6 months or has a visit in the next 30 days with authorizing provider or within the authorizing provider's specialty.  See \"Patient Info\" tab in inbasket, or \"Choose Columns\" in Meds & Orders section of the refill encounter.           Passed - Blood pressure less than 140/90 in past 6 months    BP Readings from Last 3 Encounters:   01/29/18 138/80   01/16/18 144/80   12/11/17 170/80                Passed - Patient has documented LDL within the past 12 mos.    Recent Labs   Lab Test  08/02/17   0830   LDL  105*            Passed - Patient has had a Microalbumin in the past 12 mos.    Recent Labs   Lab Test  08/17/17   1057   MICROL  8   UMALCR  3.84            Passed - Patient is age 10 or older       Passed - Patient has documented A1c within the specified period of time.    If HgbA1C is 8 or greater, it needs to be on file within the past 3 months.  If less than 8, must be on file within the past 6 months.     Recent Labs   Lab Test  03/19/18   0812   A1C  6.6*            Passed - Patient's CR is NOT>1.4 OR Patient's EGFR is NOT<45 within past 12 mos.    Recent Labs   Lab Test  03/19/18   0812   GFRESTIMATED  65   GFRESTBLACK  78       Recent Labs   Lab Test  03/19/18   0812   CR  1.17            Passed - Patient does NOT have a diagnosis of CHF.   _________________________________________________________________________________________________________________________       lisinopril (PRINIVIL/ZESTRIL) 40 MG " "tablet  Last Written Prescription Date:  6/25/18  Last Fill Quantity: 90,  # refills: 1   Last Office Visit: 1/16/2018   Future Office Visit:      90 tablet 1    ACE Inhibitors (Including Combos) Protocol Passed    7/18/2018 10:12 AM       Passed - Blood pressure under 140/90 in past 12 months    BP Readings from Last 3 Encounters:   01/29/18 138/80   01/16/18 144/80   12/11/17 170/80                Passed - Recent (12 mo) or future (30 days) visit within the authorizing provider's specialty    Patient had office visit in the last 12 months or has a visit in the next 30 days with authorizing provider or within the authorizing provider's specialty.  See \"Patient Info\" tab in inbasket, or \"Choose Columns\" in Meds & Orders section of the refill encounter.           Passed - Patient is age 18 or older       Passed - Normal serum creatinine on file in past 12 months    Recent Labs   Lab Test  03/19/18   0812   CR  1.17            Passed - Normal serum potassium on file in past 12 months    Recent Labs   Lab Test  03/19/18   0812   POTASSIUM  4.2               "

## 2018-07-18 NOTE — LETTER
[facility]-[address]  July 19, 2018       [place] [address]           Phone :  [phone]          Fax:  [phone]  Edd Fong  [address] [address]      Dear Edd,    We recently received a call from your pharmacy requesting a refill of your medication - LIPITOR.    A review of your chart indicates that an appointment is required with the lab for your yearly fasting cholesterol blood work. Please call the clinic at [phone] to schedule your fasting lab only appointment.    We have authorized one refill of your medication to allow time for you to schedule your appointment.    Taking care of your health is important to us, and ongoing visits with your provider are vital to your care.  We look forward to seeing you in the near future.        Sincerely,        Janis Marcial MD / Yissel Mcdonald RN

## 2018-07-19 RX ORDER — ATORVASTATIN CALCIUM 80 MG/1
TABLET, FILM COATED ORAL
Qty: 90 TABLET | Refills: 0 | Status: SHIPPED | OUTPATIENT
Start: 2018-07-19 | End: 2019-09-17

## 2018-07-19 NOTE — TELEPHONE ENCOUNTER
Many medications were prescribed for 6 months back in August 2017 so unsure if pt is taking them as directed.  Pt is also due for Dm appt.    [employer]t sent    Noah Oropeza RN, BSN

## 2018-07-19 NOTE — TELEPHONE ENCOUNTER
Medication is being filled for 1 time refill only due to:  Patient needs labs yearly fasting cholesterol. Future labs ordered lipid panel. Letter sent to patient.   Yissel Mcdonald RN

## 2018-07-24 RX ORDER — PAROXETINE 20 MG/1
20 TABLET, FILM COATED ORAL AT BEDTIME
Qty: 90 TABLET | Refills: 1 | Status: SHIPPED | OUTPATIENT
Start: 2018-07-24 | End: 2019-03-08

## 2018-07-24 RX ORDER — HYDROCHLOROTHIAZIDE 12.5 MG/1
12.5 CAPSULE ORAL DAILY
Qty: 90 CAPSULE | Refills: 1 | Status: SHIPPED | OUTPATIENT
Start: 2018-07-24 | End: 2018-12-19

## 2018-07-24 RX ORDER — OMEPRAZOLE 40 MG/1
CAPSULE, DELAYED RELEASE ORAL
Qty: 90 CAPSULE | Refills: 1 | Status: SHIPPED | OUTPATIENT
Start: 2018-07-24 | End: 2019-03-15

## 2018-07-24 RX ORDER — FENOFIBRATE 160 MG/1
160 TABLET ORAL DAILY
Qty: 90 TABLET | Refills: 1 | Status: SHIPPED | OUTPATIENT
Start: 2018-07-24 | End: 2019-03-15

## 2018-07-24 RX ORDER — METOPROLOL SUCCINATE 100 MG/1
100 TABLET, EXTENDED RELEASE ORAL DAILY
Qty: 90 TABLET | Refills: 1 | Status: SHIPPED | OUTPATIENT
Start: 2018-07-24 | End: 2019-03-15

## 2018-07-24 RX ORDER — ATORVASTATIN CALCIUM 80 MG/1
80 TABLET, FILM COATED ORAL DAILY
Qty: 90 TABLET | Refills: 1 | Status: SHIPPED | OUTPATIENT
Start: 2018-07-24 | End: 2018-10-20

## 2018-07-24 RX ORDER — POLYETHYLENE GLYCOL 3350 17 G/17G
1 POWDER, FOR SOLUTION ORAL DAILY
Qty: 1054 G | Refills: 3 | Status: SHIPPED | OUTPATIENT
Start: 2018-07-24 | End: 2018-07-27

## 2018-07-24 RX ORDER — CLOTRIMAZOLE 1 %
CREAM (GRAM) TOPICAL 2 TIMES DAILY
Qty: 60 G | Refills: 3 | Status: SHIPPED | OUTPATIENT
Start: 2018-07-24 | End: 2019-03-19

## 2018-07-27 RX ORDER — POLYETHYLENE GLYCOL 3350 17 G/17G
POWDER, FOR SOLUTION ORAL
Qty: 1054 G | Refills: 3 | Status: SHIPPED | OUTPATIENT
Start: 2018-07-27 | End: 2018-12-11

## 2018-08-01 DIAGNOSIS — E78.5 HYPERLIPIDEMIA LDL GOAL <100: ICD-10-CM

## 2018-08-01 DIAGNOSIS — E11.8 CONTROLLED TYPE 2 DIABETES MELLITUS WITH COMPLICATION, WITHOUT LONG-TERM CURRENT USE OF INSULIN (H): ICD-10-CM

## 2018-08-01 PROCEDURE — 80061 LIPID PANEL: CPT | Performed by: FAMILY MEDICINE

## 2018-08-01 PROCEDURE — 82043 UR ALBUMIN QUANTITATIVE: CPT | Performed by: FAMILY MEDICINE

## 2018-08-01 PROCEDURE — 36415 COLL VENOUS BLD VENIPUNCTURE: CPT | Performed by: FAMILY MEDICINE

## 2018-08-02 LAB
CHOLEST SERPL-MCNC: 191 MG/DL
CREAT UR-MCNC: 156 MG/DL
HDLC SERPL-MCNC: 31 MG/DL
LDLC SERPL CALC-MCNC: 126 MG/DL
MICROALBUMIN UR-MCNC: 6 MG/L
MICROALBUMIN/CREAT UR: 3.71 MG/G CR (ref 0–17)
NONHDLC SERPL-MCNC: 160 MG/DL
TRIGL SERPL-MCNC: 168 MG/DL

## 2018-08-06 ENCOUNTER — TRANSFERRED RECORDS (OUTPATIENT)
Dept: HEALTH INFORMATION MANAGEMENT | Facility: CLINIC | Age: 53
End: 2018-08-06

## 2018-08-15 ENCOUNTER — TRANSFERRED RECORDS (OUTPATIENT)
Dept: HEALTH INFORMATION MANAGEMENT | Facility: CLINIC | Age: 53
End: 2018-08-15

## 2018-08-27 ENCOUNTER — OFFICE VISIT (OUTPATIENT)
Dept: FAMILY MEDICINE | Facility: CLINIC | Age: 53
End: 2018-08-27
Payer: COMMERCIAL

## 2018-08-27 VITALS
BODY MASS INDEX: 28.79 KG/M2 | OXYGEN SATURATION: 97 % | RESPIRATION RATE: 18 BRPM | TEMPERATURE: 97.9 F | SYSTOLIC BLOOD PRESSURE: 132 MMHG | WEIGHT: 236.4 LBS | HEART RATE: 62 BPM | DIASTOLIC BLOOD PRESSURE: 72 MMHG | HEIGHT: 76 IN

## 2018-08-27 DIAGNOSIS — E78.5 HYPERLIPIDEMIA LDL GOAL <100: ICD-10-CM

## 2018-08-27 DIAGNOSIS — I10 HTN, GOAL BELOW 140/90: ICD-10-CM

## 2018-08-27 DIAGNOSIS — K64.4 EXTERNAL HEMORRHOIDS: ICD-10-CM

## 2018-08-27 DIAGNOSIS — E11.8 CONTROLLED TYPE 2 DIABETES MELLITUS WITH COMPLICATION, WITHOUT LONG-TERM CURRENT USE OF INSULIN (H): ICD-10-CM

## 2018-08-27 DIAGNOSIS — F33.42 MAJOR DEPRESSIVE DISORDER, RECURRENT EPISODE, IN FULL REMISSION (H): ICD-10-CM

## 2018-08-27 DIAGNOSIS — K21.9 GASTROESOPHAGEAL REFLUX DISEASE WITHOUT ESOPHAGITIS: ICD-10-CM

## 2018-08-27 DIAGNOSIS — Z13.89 SCREENING FOR DIABETIC PERIPHERAL NEUROPATHY: ICD-10-CM

## 2018-08-27 DIAGNOSIS — D64.9 ANEMIA, UNSPECIFIED TYPE: ICD-10-CM

## 2018-08-27 DIAGNOSIS — G47.33 OSA (OBSTRUCTIVE SLEEP APNEA): ICD-10-CM

## 2018-08-27 DIAGNOSIS — Z00.00 ROUTINE GENERAL MEDICAL EXAMINATION AT A HEALTH CARE FACILITY: Primary | ICD-10-CM

## 2018-08-27 LAB
ERYTHROCYTE [DISTWIDTH] IN BLOOD BY AUTOMATED COUNT: 12.5 % (ref 10–15)
HBA1C MFR BLD: 6.5 % (ref 0–5.6)
HCT VFR BLD AUTO: 38.5 % (ref 40–53)
HGB BLD-MCNC: 12.7 G/DL (ref 13.3–17.7)
MCH RBC QN AUTO: 30.5 PG (ref 26.5–33)
MCHC RBC AUTO-ENTMCNC: 33 G/DL (ref 31.5–36.5)
MCV RBC AUTO: 92 FL (ref 78–100)
PLATELET # BLD AUTO: 186 10E9/L (ref 150–450)
RBC # BLD AUTO: 4.17 10E12/L (ref 4.4–5.9)
WBC # BLD AUTO: 4.8 10E9/L (ref 4–11)

## 2018-08-27 PROCEDURE — 36415 COLL VENOUS BLD VENIPUNCTURE: CPT | Performed by: FAMILY MEDICINE

## 2018-08-27 PROCEDURE — 80053 COMPREHEN METABOLIC PANEL: CPT | Performed by: FAMILY MEDICINE

## 2018-08-27 PROCEDURE — 99396 PREV VISIT EST AGE 40-64: CPT | Performed by: FAMILY MEDICINE

## 2018-08-27 PROCEDURE — 83036 HEMOGLOBIN GLYCOSYLATED A1C: CPT | Performed by: FAMILY MEDICINE

## 2018-08-27 PROCEDURE — 99207 C FOOT EXAM  NO CHARGE: CPT | Mod: 25 | Performed by: FAMILY MEDICINE

## 2018-08-27 PROCEDURE — 85027 COMPLETE CBC AUTOMATED: CPT | Performed by: FAMILY MEDICINE

## 2018-08-27 RX ORDER — HYDROCORTISONE ACETATE 25 MG/1
25 SUPPOSITORY RECTAL 2 TIMES DAILY
Qty: 12 SUPPOSITORY | Refills: 1 | Status: SHIPPED | OUTPATIENT
Start: 2018-08-27 | End: 2019-09-17

## 2018-08-27 NOTE — PROGRESS NOTES
SUBJECTIVE:   CC: Edd Fong is an 53 year old male who presents for preventative health visit.     Physical   Annual:     Getting at least 3 servings of Calcium per day:  Yes    Bi-annual eye exam:  Yes    Dental care twice a year:  Yes    Sleep apnea or symptoms of sleep apnea:  Sleep apnea    Diet:  Low salt, Low fat/cholesterol and Diabetic    Frequency of exercise:  2-3 days/week    Duration of exercise:  45-60 minutes    Taking medications regularly:  Yes    Medication side effects:  None    Additional concerns today:  No      Hypertension    BP Readings from Last 3 Encounters:   08/27/18 132/72   01/29/18 138/80   01/16/18 144/80     He is exercising 3x a week at the gym and checks his BP after exercising which is fine. Currently on metoprolol 100 mg and lisinopril 40 mg daily.     Hyperlipidemia    Recent Labs   Lab Test  08/01/18   0803  08/02/17   0830   09/10/15   0814  04/20/15   0804   CHOL  191  178   < >  155  167   HDL  31*  33*   < >  33*  34*   LDL  126*  105*   < >  88  103   TRIG  168*  199*   < >  168*  151*   CHOLHDLRATIO   --    --    --   4.7  4.9    < > = values in this interval not displayed.     Currently on fenofibrate 160 mg and lipitor 80 mg daily. Noted that HDL is low and LDL is elevated, worse from previous lab work up. He has been trying to switch from eating eggs to eating Cheerios for breakfast. He eats a sandwhich with cheese almost daily.     Hemorrhoids  He takes miralax daily for constipation. He does strain a bit with bowel movements. Notes that he has hemorrhoids which bother him, particularly when he is exercising. Notes some blood on tissue when wiping after a bowel movement.     Colon cancer screening  Last colonoscopy was 11/14/2013. Normal exam other than diverticulosis noted.     TERESSA  He is seen by ENT who recommended being seen by the Sleep [facility]. He had a sleep study performed on 5/2018. He is now wearing a CPAP at night and has noticed a difference and  improvement in energy levels.     Diabetes    Lab Results   Component Value Date    A1C 6.6 03/19/2018    A1C 6.6 01/16/2018    A1C 6.3 08/02/2017    A1C 6.2 02/10/2017    A1C 7.2 08/17/2016     Patient admits that he has been over eating a bit lately--he was eating out a lot when they had family from out of town come to visit. Taking metformin 500 mg twice daily.     Depression    PHQ-9 SCORE 8/17/2017 3/6/2018 3/17/2018   Total Score - - -   Total Score MyChart - - 0   Total Score 0 0 0     Doing well with paxil 20 mg daily.       Today's PHQ-2 Score:   PHQ-2 ( 1999 [employer]) 8/25/2018   Q1: Little interest or pleasure in doing things 0   Q2: Feeling down, depressed or hopeless 0   PHQ-2 Score 0   Q1: Little interest or pleasure in doing things Not at all   Q2: Feeling down, depressed or hopeless Not at all   PHQ-2 Score 0     Abuse: Current or Past(Physical, Sexual or Emotional)- No  Do you feel safe in your environment - Yes    Social History   Substance Use Topics     Smoking status: Never Smoker     Smokeless tobacco: Never Used     Alcohol use 0.0 oz/week      Comment: 1 beer a day     Alcohol Use 8/25/2018   If you drink alcohol do you typically have greater than 3 drinks per day OR greater than 7 drinks per week? No     Last PSA:   PSA   Date Value Ref Range Status   02/08/2011 0.38 0 - 4 ug/L Final     Reviewed orders with patient. Reviewed health maintenance and updated orders accordingly - Yes  Labs reviewed in EPIC  BP Readings from Last 3 Encounters:   08/27/18 132/72   01/29/18 138/80   01/16/18 144/80    Wt Readings from Last 3 Encounters:   08/27/18 107.2 kg (236 lb 6.4 oz)   01/16/18 107.5 kg (237 lb 1.6 oz)   12/11/17 106.7 kg (235 lb 4.8 oz)         Recent Labs   Lab Test  08/01/18   0803  03/19/18   0812  01/16/18   1039  08/17/17   1034  08/02/17   0830  02/10/17   0839   03/15/16   0931   A1C   --   6.6*  6.6*   --   6.3*  6.2*   < >   --    LDL  126*   --    --    --   105*  119*   < >  109*  "  HDL  31*   --    --    --   33*  35*   < >  33*   TRIG  168*   --    --    --   199*  122   < >  195*   ALT   --   32  28  29   --   39   --   34   CR   --   1.17  1.03  1.08   --   1.10   --   1.17   GFRESTIMATED   --   65  75  71   --   70   --   65   GFRESTBLACK   --   78  >90  86   --   85   --   79   POTASSIUM   --   4.2  4.4  4.3   --   4.1   --   4.4   TSH   --    --    --   2.30   --    --    --   2.99    < > = values in this interval not displayed.        Reviewed and updated as needed this visit by clinical staff  Tobacco  Allergies  Meds  Med Hx  Surg Hx  Fam Hx  Soc Hx        Reviewed and updated as needed this visit by Provider        Review of Systems  CONSTITUTIONAL: NEGATIVE for fever, chills, change in weight  INTEGUMENTARY/SKIN: NEGATIVE for worrisome rashes, moles or lesions  EYES: NEGATIVE for vision changes or irritation  ENT: NEGATIVE for ear, mouth and throat problems  RESP: NEGATIVE for significant cough or SOB  CV: NEGATIVE for chest pain, palpitations or peripheral edema  GI: NEGATIVE for nausea, abdominal pain, heartburn, or change in bowel habits   male: negative for dysuria, hematuria, decreased urinary stream, erectile dysfunction, urethral discharge  MUSCULOSKELETAL: NEGATIVE for significant arthralgias or myalgia  NEURO: NEGATIVE for weakness, dizziness or paresthesias  PSYCHIATRIC: NEGATIVE for changes in mood or affect    This document serves as a record of the services and decisions personally performed and made by Jansi Marcial MD. It was created on her behalf by Mireya Banks, a trained medical scribe. The creation of this document is based on the provider's statements to the medical scribe.  Mireya Banks August 27, 2018 10:23 AM     OBJECTIVE:   /72 (BP Location: Right arm, Patient Position: Chair, Cuff Size: Adult Large)  Pulse 62  Temp 97.9  F (36.6  C) (Oral)  Resp 18  Ht 1.93 m (6' 4\")  Wt 107.2 kg (236 lb 6.4 oz)  SpO2 97%  BMI 28.78 " kg/m2    Physical Exam  GENERAL: healthy, alert and no distress  EYES: Eyes grossly normal to inspection, PERRL and conjunctivae and sclerae normal  HENT: ear canals and TM's normal, nose and mouth without ulcers or lesions  NECK: no adenopathy, no asymmetry, masses, or scars and thyroid normal to palpation  RESP: lungs clear to auscultation - no rales, rhonchi or wheezes  CV: regular rate and rhythm, normal S1 S2, no S3 or S4, no murmur, click or rub, no peripheral edema and peripheral pulses strong  ABDOMEN: soft, nontender, no hepatosplenomegaly, no masses and bowel sounds normal  MS: no gross musculoskeletal defects noted, no edema  SKIN: no suspicious lesions or rashes  NEURO: Normal strength and tone, mentation intact and speech normal  PSYCH: mentation appears normal, affect normal/bright    Diagnostic Test Results:  No results found for this or any previous visit (from the past 24 hour(s)).    ASSESSMENT/PLAN:   (Z00.00) Routine general medical examination at a health care facility  (primary encounter diagnosis)  Comment: Normal physical exam. Lab work up today  Plan: Comprehensive metabolic panel, CBC with         platelets          (E78.5) Hyperlipidemia LDL goal <100  Comment: Noted increase in LDL and decrease in HDL. Discussed diet changes for weight loss--aim for 10 lb weight loss as a goal. Suggested switching to egg whites for breakfast or Cheerio's, or switching to PB&J or taking out cheese on sandwhiches. DASH diet recommended.   Plan: Comprehensive metabolic panel          (F33.42) Major depressive disorder, recurrent episode, in full remission (H)  Comment: Stable. Continue paxil     (I10) HTN, goal below 140/90  Comment: BP slightly elevated today. Will continue to monitor. Suggested DASH diet  Plan: Comprehensive metabolic panel          (E11.8) Controlled type 2 diabetes mellitus with complication, without long-term current use of insulin (H)  Comment: Checking A1c today. Discussed diet  "changes for weight loss. Continue working on regular exercise.   Plan: HEMOGLOBIN A1C          (K21.9) Gastroesophageal reflux disease without esophagitis  Comment: Stable. Continue omeprazole daily    (K64.4) External hemorrhoids  Comment: Prescribed suppositories. Patient will let me know if hemorrhoids continue to be bothersome. Recommend a high fiber diet to help prevent diverticulitis, suggested a daily fiber supplement.   Plan: hydrocortisone (ANUSOL-HC) 25 MG Suppository,         psyllium 0.52 g capsule          (G47.33) TERESSA (obstructive sleep apnea)  Comment: Now using CPAP. Continue nightly use    Follow up in 6 months    COUNSELING:   Reviewed preventive health counseling, as reflected in patient instructions       Regular exercise       Healthy diet/nutrition       Immunizations       Colon cancer screening    BP Readings from Last 1 Encounters:   08/27/18 132/72     Estimated body mass index is 28.78 kg/(m^2) as calculated from the following:    Height as of this encounter: 1.93 m (6' 4\").    Weight as of this encounter: 107.2 kg (236 lb 6.4 oz).  Weight management plan: Discussed healthy diet and exercise guidelines and patient will follow up in 12 months in clinic to re-evaluate.   reports that he has never smoked. He has never used smokeless tobacco.    Counseling Resources:  ATP IV Guidelines  Pooled Cohorts Equation Calculator  FRAX Risk Assessment  ICSI Preventive Guidelines  Dietary Guidelines for Americans, 2010  USDA's MyPlate  ASA Prophylaxis  Lung CA Screening    The information in this document, created by the medical scribe for me, accurately reflects the services I personally performed and the decisions made by me. I have reviewed and approved this document for accuracy prior to leaving the patient care area.  August 27, 2018 10:23 AM    Janis Marcial MD  [facility]  "

## 2018-08-27 NOTE — PATIENT INSTRUCTIONS
Preventive Health Recommendations  Male Ages 50 - 64    Yearly exam:             See your health care provider every year in order to  o   Review health changes.   o   Discuss preventive care.    o   Review your medicines if your doctor has prescribed any.     Have a cholesterol test every 5 years, or more frequently if you are at risk for high cholesterol/heart disease.     Have a diabetes test (fasting glucose) every three years. If you are at risk for diabetes, you should have this test more often.     Have a colonoscopy at age 50, or have a yearly FIT test (stool test). These exams will check for colon cancer.      Talk with your health care provider about whether or not a prostate cancer screening test (PSA) is right for you.    You should be tested each year for STDs (sexually transmitted diseases), if you re at risk.     Shots: Get a flu shot each year. Get a tetanus shot every 10 years.     Nutrition:    Eat at least 5 servings of fruits and vegetables daily.     Eat whole-grain bread, whole-wheat pasta and brown rice instead of white grains and rice.     Get adequate Calcium and Vitamin D.     Lifestyle    Exercise for at least 150 minutes a week (30 minutes a day, 5 days a week). This will help you control your weight and prevent disease.     Limit alcohol to one drink per day.     No smoking.     Wear sunscreen to prevent skin cancer.     See your dentist every six months for an exam and cleaning.     See your eye doctor every 1 to 2 years.    Eating Heart-Healthy Food: Using the DASH Plan    Eating for your heart doesn t have to be hard or boring. You just need to know how to make healthier choices. The DASH eating plan has been developed to help you do just that. DASH stands for Dietary Approaches to Stop Hypertension. It is a plan that has been proven to be healthier for your heart and to lower your risk for high blood pressure. It can also help lower your risk for cancer, heart disease,  osteoporosis, and diabetes.  Choosing from each food group  Choose foods from each of the food groups below each day. Try to get the recommended number of servings for each food group. The serving numbers are based on a diet of 2,000 calories a day. Talk to your doctor if you re unsure about your calorie needs. Along with getting the correct servings, the DASH plan also recommends a sodium intake less than 2,300 mg per day.        Grains  Servings: 6 to 8 a day  A serving is:    1 slice bread    1 ounce dry cereal    Half a cup cooked rice, pasta or cereal  Best choices: Whole grains and any grains high in fiber. Vegetables  Servings: 4 to 5 a day  A serving is:    1 cup raw leafy vegetable    Half a cup cut-up raw or cooked vegetable    Half a cup vegetable juice  Best choices: Fresh or frozen vegetables prepared without added salt or fat.   Fruits  Servings: 4 to 5 a day  A serving is:    1 medium fruit    One-quarter cup dried fruit    Half a cup fresh, frozen, or canned fruit    Half a cup of 100% fruit juices  Best choices: A variety of fresh fruits of different colors. Whole fruits are a better choice than fruit juices. Low-fat or fat-free dairy  Servings: 2 to 3 a day  A serving is:    1 cup milk    1 cup yogurt    One and a half ounces cheese  Best choices: Skim or 1% milk, low-fat or fat-free yogurt or buttermilk, and low-fat cheeses.         Lean meats, poultry, fish  Servings: 6 or fewer a day  A serving is:    1 ounce cooked meats, poultry, or fish    1 egg  Best choices: Lean poultry and fish. Trim away visible fat. Broil, grill, roast, or boil instead of frying. Remove skin from poultry before eating. Limit how much red meat you eat.  Nuts, seeds, beans  Servings: 4 to 5 a week  A serving is:    One-third cup nuts (one and a half ounces)    2 tablespoons nut butter or seeds    Half a cup cooked dry beans or legumes  Best choices: Dry roasted nuts with no salt added, lentils, kidney beans, garbanzo  beans, and whole carter beans.   Fats and oils  Servings: 2 to 3 a day  A serving is:    1 teaspoon vegetable oil    1 teaspoon soft margarine    1 tablespoon mayonnaise    2 tablespoons salad dressing  Best choices: Nut and vegetable oils (nontropical vegetable oils), such as olive and canola oil. Sweets  Servings: 5 a week or fewer  A serving is:    1 tablespoon sugar, maple syrup, or honey    1 tablespoon jam or jelly    1 half-ounce jelly beans (about 15)    1 cup lemonade  Best choices: Dried fruit can be a satisfying sweet. Choose low-fat sweets. And watch your serving sizes!      For more on the DASH eating plan, visit:  www.nhlbi.nih.gov/health/health-topics/topics/dash   Date Last Reviewed: 6/1/2016 2000-2017 [employer]. [address] [place] [address], [place] [id number]. All rights reserved. This information is not intended as a substitute for professional medical care. Always follow your healthcare professional's instructions.

## 2018-08-27 NOTE — MR AVS SNAPSHOT
After Visit Summary   8/27/2018    Edd Fong    MRN: [id number]           Patient Information     Date Of Birth          1965        Visit Information        Provider Department      8/27/2018 10:00 AM Janis Marcial MD [facility]        Today's Diagnoses     Routine general medical examination at a health care facility    -  1    Screening for diabetic peripheral neuropathy        Hyperlipidemia LDL goal <100        Major depressive disorder, recurrent episode, in full remission (H)        HTN, goal below 140/90        Controlled type 2 diabetes mellitus with complication, without long-term current use of insulin (H)        Gastroesophageal reflux disease without esophagitis        External hemorrhoids        TERESSA (obstructive sleep apnea)          Care Instructions      Preventive Health Recommendations  Male Ages 50 - 64    Yearly exam:             See your health care provider every year in order to  o   Review health changes.   o   Discuss preventive care.    o   Review your medicines if your doctor has prescribed any.     Have a cholesterol test every 5 years, or more frequently if you are at risk for high cholesterol/heart disease.     Have a diabetes test (fasting glucose) every three years. If you are at risk for diabetes, you should have this test more often.     Have a colonoscopy at age 50, or have a yearly FIT test (stool test). These exams will check for colon cancer.      Talk with your health care provider about whether or not a prostate cancer screening test (PSA) is right for you.    You should be tested each year for STDs (sexually transmitted diseases), if you re at risk.     Shots: Get a flu shot each year. Get a tetanus shot every 10 years.     Nutrition:    Eat at least 5 servings of fruits and vegetables daily.     Eat whole-grain bread, whole-wheat pasta and brown rice instead of white grains and rice.     Get adequate Calcium and Vitamin D.      Lifestyle    Exercise for at least 150 minutes a week (30 minutes a day, 5 days a week). This will help you control your weight and prevent disease.     Limit alcohol to one drink per day.     No smoking.     Wear sunscreen to prevent skin cancer.     See your dentist every six months for an exam and cleaning.     See your eye doctor every 1 to 2 years.    Eating Heart-Healthy Food: Using the DASH Plan    Eating for your heart doesn t have to be hard or boring. You just need to know how to make healthier choices. The DASH eating plan has been developed to help you do just that. DASH stands for Dietary Approaches to Stop Hypertension. It is a plan that has been proven to be healthier for your heart and to lower your risk for high blood pressure. It can also help lower your risk for cancer, heart disease, osteoporosis, and diabetes.  Choosing from each food group  Choose foods from each of the food groups below each day. Try to get the recommended number of servings for each food group. The serving numbers are based on a diet of 2,000 calories a day. Talk to your doctor if you re unsure about your calorie needs. Along with getting the correct servings, the DASH plan also recommends a sodium intake less than 2,300 mg per day.        Grains  Servings: 6 to 8 a day  A serving is:    1 slice bread    1 ounce dry cereal    Half a cup cooked rice, pasta or cereal  Best choices: Whole grains and any grains high in fiber. Vegetables  Servings: 4 to 5 a day  A serving is:    1 cup raw leafy vegetable    Half a cup cut-up raw or cooked vegetable    Half a cup vegetable juice  Best choices: Fresh or frozen vegetables prepared without added salt or fat.   Fruits  Servings: 4 to 5 a day  A serving is:    1 medium fruit    One-quarter cup dried fruit    Half a cup fresh, frozen, or canned fruit    Half a cup of 100% fruit juices  Best choices: A variety of fresh fruits of different colors. Whole fruits are a better choice than  fruit juices. Low-fat or fat-free dairy  Servings: 2 to 3 a day  A serving is:    1 cup milk    1 cup yogurt    One and a half ounces cheese  Best choices: Skim or 1% milk, low-fat or fat-free yogurt or buttermilk, and low-fat cheeses.         Lean meats, poultry, fish  Servings: 6 or fewer a day  A serving is:    1 ounce cooked meats, poultry, or fish    1 egg  Best choices: Lean poultry and fish. Trim away visible fat. Broil, grill, roast, or boil instead of frying. Remove skin from poultry before eating. Limit how much red meat you eat.  Nuts, seeds, beans  Servings: 4 to 5 a week  A serving is:    One-third cup nuts (one and a half ounces)    2 tablespoons nut butter or seeds    Half a cup cooked dry beans or legumes  Best choices: Dry roasted nuts with no salt added, lentils, kidney beans, garbanzo beans, and whole carter beans.   Fats and oils  Servings: 2 to 3 a day  A serving is:    1 teaspoon vegetable oil    1 teaspoon soft margarine    1 tablespoon mayonnaise    2 tablespoons salad dressing  Best choices: Nut and vegetable oils (nontropical vegetable oils), such as olive and canola oil. Sweets  Servings: 5 a week or fewer  A serving is:    1 tablespoon sugar, maple syrup, or honey    1 tablespoon jam or jelly    1 half-ounce jelly beans (about 15)    1 cup lemonade  Best choices: Dried fruit can be a satisfying sweet. Choose low-fat sweets. And watch your serving sizes!      For more on the DASH eating plan, visit:  www.nhlbi.nih.gov/health/health-topics/topics/dash   Date Last Reviewed: 6/1/2016 2000-2017 [employer]. [address] [place] [address], [place] [address]. All rights reserved. This information is not intended as a substitute for professional medical care. Always follow your healthcare professional's instructions.                Follow-ups after your visit        Follow-up notes from your care team     Return in about 6 months (around 2/27/2019) for diabetes check, weight check,  "fasting labs.      Who to contact     If you have questions or need follow up information about today's clinic visit or your schedule please contact [facility] directly at [phone].  Normal or non-critical lab and imaging results will be communicated to you by MyChart, letter or phone within 4 business days after the clinic has received the results. If you do not hear from us within 7 days, please contact the clinic through [employer] or phone. If you have a critical or abnormal lab result, we will notify you by phone as soon as possible.  Submit refill requests through [employer] or call your pharmacy and they will forward the refill request to us. Please allow 3 business days for your refill to be completed.          Additional Information About Your Visit        [facility] Health Information     [employer] gives you secure access to your electronic health record. If you see a primary care provider, you can also send messages to your care team and make appointments. If you have questions, please call your primary care clinic.  If you do not have a primary care provider, please call [phone] and they will assist you.        Care EveryWhere ID     This is your Care EveryWhere ID. This could be used by other organizations to access your [facility] medical records  [id number]        Your Vitals Were     Pulse Temperature Respirations Height Pulse Oximetry BMI (Body Mass Index)    62 97.9  F (36.6  C) (Oral) 18 6' 4\" (1.93 m) 97% 28.78 kg/m2       Blood Pressure from Last 3 Encounters:   08/27/18 140/74   01/29/18 138/80   01/16/18 144/80    Weight from Last 3 Encounters:   08/27/18 236 lb 6.4 oz (107.2 kg)   01/16/18 237 lb 1.6 oz (107.5 kg)   12/11/17 235 lb 4.8 oz (106.7 kg)              We Performed the Following     CBC with platelets     Comprehensive metabolic panel     FOOT EXAM  NO CHARGE [10577.114]     HEMOGLOBIN A1C          Today's Medication Changes          These changes are accurate as of 8/27/18 " 10:45 AM.  If you have any questions, ask your nurse or doctor.               Start taking these medicines.        Dose/Directions    hydrocortisone 25 MG Suppository   Commonly known as:  ANUSOL-HC   Used for:  External hemorrhoids   Started by:  Janis Marcial MD        Dose:  25 mg   Place 1 suppository (25 mg) rectally 2 times daily   Quantity:  12 suppository   Refills:  1       psyllium 0.52 g capsule   Used for:  External hemorrhoids   Started by:  Janis Marcial MD        Dose:  1 capsule   Take 1 capsule (0.52 g) by mouth daily   Quantity:  100 capsule   Refills:  3            Where to get your medicines      These medications were sent to [facility] - [facility] [address] [facility]  [address] [facility], [facility] [address]     Phone:  [phone]     hydrocortisone 25 MG Suppository    psyllium 0.52 g capsule                Primary Care Provider Office Phone # Fax #    Janis Marcial [phone] [address]   [facility] [address]        Equal Access to Services     [facility]: Hadii aad ku hadasho Soomaali, waaxda luqadaha, qaybta kaalmada adeegyada, waxay teri salas . So [facility] [phone].    ATENCIÓN: Si habla español, tiene a hough disposición servicios gratuitos de asistencia lingüística. Llame al [phone].    We comply with applicable federal civil rights laws and [place] laws. We do not discriminate on the basis of race, color, national origin, age, disability, sex, sexual orientation, or gender identity.            Thank you!     Thank you for choosing [facility]  for your care. Our goal is always to provide you with excellent care. Hearing back from our patients is one way we can continue to improve our services. Please take a few minutes to complete the written survey that you may receive in the mail after your visit with us. Thank you!             Your Updated  Medication List - Protect others around you: Learn how to safely use, store and throw away your medicines at www.disposemymeds.org.          This list is accurate as of 8/27/18 10:45 AM.  Always use your most recent med list.                   Brand Name Dispense Instructions for use Diagnosis    aspirin 81 MG chewable tablet     180 tablet    Take 2 tablets (162 mg) by mouth daily    Ischemic heart disease due to coronary artery obstruction (H)       * atorvastatin 80 MG tablet    LIPITOR    90 tablet    TAKE 1 TABLET EVERY DAY    Hyperlipidemia LDL goal <100       * atorvastatin 80 MG tablet    LIPITOR    90 tablet    Take 1 tablet (80 mg) by mouth daily    Hyperlipidemia LDL goal <100       blood glucose monitoring lancets     1 Box    Use to test blood sugars 1 times daily or as directed.    Type 2 diabetes mellitus without complication (H)       * blood glucose monitoring meter device kit    no brand specified    1 kit    1 kit continuous.    Type 2 diabetes, HbA1C goal < 8% (H)       * blood glucose monitoring meter device kit    no brand specified    1 kit    Use to test blood sugar 1 times daily or as directed.    Type 2 diabetes, HbA1C goal < 8% (H), Controlled type 2 diabetes mellitus with complication, without long-term current use of insulin (H)       * blood glucose monitoring test strip    ONETOUCH ULTRA    50 each    USE TO TEST BLOOD SUGARS ONCE DAILY OR AS DIRECTED    Controlled type 2 diabetes mellitus with complication, without long-term current use of insulin (H)       * blood glucose monitoring test strip    ONETOUCH ULTRA    3 Box    Use to test blood sugars 1 times daily or as directed.    Controlled type 2 diabetes mellitus with complication, without long-term current use of insulin (H), Type 2 diabetes, HbA1C goal < 8% (H)       clotrimazole 1 % cream    LOTRIMIN    60 g    Apply topically 2 times daily APPLY TOPICALLY 2 TIMES DAILY    Tinea pedis of both feet       fenofibrate 160 MG tablet      90 tablet    Take 1 tablet (160 mg) by mouth daily TAKE 1 TABLET EVERY DAY    Hyperlipidemia LDL goal <100       hydrochlorothiazide 12.5 MG capsule    MICROZIDE    90 capsule    Take 1 capsule (12.5 mg) by mouth daily    HTN, goal below 140/90       hydrocortisone 25 MG Suppository    ANUSOL-HC    12 suppository    Place 1 suppository (25 mg) rectally 2 times daily    External hemorrhoids       lisinopril 40 MG tablet    PRINIVIL/ZESTRIL    90 tablet    TAKE 1 TABLET EVERY DAY    HTN, goal below 140/90       metFORMIN 500 MG tablet    GLUCOPHAGE    180 tablet    Take 1 tablet (500 mg) by mouth 2 times daily (with meals) TAKE 1 TABLET TWICE DAILY WITH MEALS    Controlled type 2 diabetes mellitus with complication, without long-term current use of insulin (H)       metoprolol succinate 100 MG 24 hr tablet    TOPROL-XL    90 tablet    Take 1 tablet (100 mg) by mouth daily TAKE 1 TABLET EVERY DAY    Essential hypertension, benign       nitroGLYcerin 0.4 MG sublingual tablet    NITROSTAT    25 tablet    For chest pain place 1 tablet under the tongue every 5 minutes for 3 doses. If symptoms persist 5 minutes after 1st dose call 911.    Ischemic heart disease due to coronary artery obstruction (H)       nystatin-triamcinolone cream    MYCOLOG II    60 g    APPLY TOPICALLY TWO TIMES DAILY, DO NOT USE FOR MORE THAN 14 DAYS WITHOUT BREAK    Tinea cruris       omeprazole 40 MG capsule    priLOSEC    90 capsule    TAKE 1 CAPSULE EVERY DAY 30 - 60 MINUTES BEFORE A MEAL    Gastroesophageal reflux disease without esophagitis       PARoxetine 20 MG tablet    PAXIL    90 tablet    Take 1 tablet (20 mg) by mouth At Bedtime    Major depressive disorder, recurrent episode, in full remission (H)       polyethylene glycol powder    MIRALAX/GLYCOLAX    1054 g    MIX 1/2 CAPFUL IN 4 OUNCES OF LIQUID AND DRINK DAILY AS NEEDED    Hyperlipidemia LDL goal <100       psyllium 0.52 g capsule     100 capsule    Take 1 capsule (0.52 g) by  mouth daily    External hemorrhoids       * Notice:  This list has 6 medication(s) that are the same as other medications prescribed for you. Read the directions carefully, and ask your doctor or other care provider to review them with you.

## 2018-08-28 LAB
ALBUMIN SERPL-MCNC: 4.2 G/DL (ref 3.4–5)
ALP SERPL-CCNC: 62 U/L (ref 40–150)
ALT SERPL W P-5'-P-CCNC: 32 U/L (ref 0–70)
ANION GAP SERPL CALCULATED.3IONS-SCNC: 10 MMOL/L (ref 3–14)
AST SERPL W P-5'-P-CCNC: 24 U/L (ref 0–45)
BILIRUB SERPL-MCNC: 0.4 MG/DL (ref 0.2–1.3)
BUN SERPL-MCNC: 23 MG/DL (ref 7–30)
CALCIUM SERPL-MCNC: 9.3 MG/DL (ref 8.5–10.1)
CHLORIDE SERPL-SCNC: 107 MMOL/L (ref 94–109)
CO2 SERPL-SCNC: 25 MMOL/L (ref 20–32)
CREAT SERPL-MCNC: 1.1 MG/DL (ref 0.66–1.25)
GFR SERPL CREATININE-BSD FRML MDRD: 69 ML/MIN/1.7M2
GLUCOSE SERPL-MCNC: 183 MG/DL (ref 70–99)
POTASSIUM SERPL-SCNC: 4.1 MMOL/L (ref 3.4–5.3)
PROT SERPL-MCNC: 6.6 G/DL (ref 6.8–8.8)
SODIUM SERPL-SCNC: 142 MMOL/L (ref 133–144)

## 2018-09-12 ENCOUNTER — HOSPITAL ENCOUNTER (OUTPATIENT)
Facility: CLINIC | Age: 53
Discharge: HOME | End: 2018-09-12
Attending: INTERNAL MEDICINE | Admitting: INTERNAL MEDICINE
Payer: MEDICARE

## 2018-09-12 VITALS
OXYGEN SATURATION: 97 % | DIASTOLIC BLOOD PRESSURE: 71 MMHG | SYSTOLIC BLOOD PRESSURE: 132 MMHG | RESPIRATION RATE: 20 BRPM

## 2018-09-12 LAB
COLONOSCOPY: NORMAL
GLUCOSE BLDC GLUCOMTR-MCNC: 119 MG/DL (ref 70–99)

## 2018-09-12 PROCEDURE — 88305 TISSUE EXAM BY PATHOLOGIST: CPT | Mod: 26 | Performed by: INTERNAL MEDICINE

## 2018-09-12 PROCEDURE — 82962 GLUCOSE BLOOD TEST: CPT

## 2018-09-12 PROCEDURE — 45380 COLONOSCOPY AND BIOPSY: CPT | Performed by: INTERNAL MEDICINE

## 2018-09-12 PROCEDURE — 88305 TISSUE EXAM BY PATHOLOGIST: CPT | Performed by: INTERNAL MEDICINE

## 2018-09-12 PROCEDURE — G0500 MOD SEDAT ENDO SERVICE >5YRS: HCPCS | Performed by: INTERNAL MEDICINE

## 2018-09-12 PROCEDURE — 25000128 H RX IP 250 OP 636: Performed by: INTERNAL MEDICINE

## 2018-09-12 RX ORDER — LIDOCAINE 40 MG/G
CREAM TOPICAL
Status: DISCONTINUED | OUTPATIENT
Start: 2018-09-12 | End: 2018-09-12 | Stop reason: HOSPADM

## 2018-09-12 RX ORDER — ONDANSETRON 4 MG/1
4 TABLET, ORALLY DISINTEGRATING ORAL EVERY 6 HOURS PRN
Status: DISCONTINUED | OUTPATIENT
Start: 2018-09-12 | End: 2018-09-12 | Stop reason: HOSPADM

## 2018-09-12 RX ORDER — ONDANSETRON 2 MG/ML
4 INJECTION INTRAMUSCULAR; INTRAVENOUS EVERY 6 HOURS PRN
Status: DISCONTINUED | OUTPATIENT
Start: 2018-09-12 | End: 2018-09-12 | Stop reason: HOSPADM

## 2018-09-12 RX ORDER — NALOXONE HYDROCHLORIDE 0.4 MG/ML
.1-.4 INJECTION, SOLUTION INTRAMUSCULAR; INTRAVENOUS; SUBCUTANEOUS
Status: DISCONTINUED | OUTPATIENT
Start: 2018-09-12 | End: 2018-09-12 | Stop reason: HOSPADM

## 2018-09-12 RX ORDER — FLUMAZENIL 0.1 MG/ML
0.2 INJECTION, SOLUTION INTRAVENOUS
Status: DISCONTINUED | OUTPATIENT
Start: 2018-09-12 | End: 2018-09-12 | Stop reason: HOSPADM

## 2018-09-12 RX ORDER — ONDANSETRON 2 MG/ML
4 INJECTION INTRAMUSCULAR; INTRAVENOUS
Status: DISCONTINUED | OUTPATIENT
Start: 2018-09-12 | End: 2018-09-12 | Stop reason: HOSPADM

## 2018-09-12 RX ORDER — FENTANYL CITRATE 50 UG/ML
INJECTION, SOLUTION INTRAMUSCULAR; INTRAVENOUS PRN
Status: DISCONTINUED | OUTPATIENT
Start: 2018-09-12 | End: 2018-09-12 | Stop reason: HOSPADM

## 2018-09-12 NOTE — H&P
Pre-Endoscopy History and Physical     Edd Fong MRN# [id number]   YOB: 1965 Age: 53 year old     Date of Procedure: 9/12/2018  Primary care provider: Jansi Marcial  Type of Endoscopy: Colonoscopy with possible biopsy, possible polypectomy  Reason for Procedure: bleed  Type of Anesthesia Anticipated: Conscious Sedation    HPI:    Edd is a 53 year old male who will be undergoing the above procedure.      A history and physical has been performed. The patient's medications and allergies have been reviewed. The risks and benefits of the procedure and the sedation options and risks were discussed with the patient.  All questions were answered and informed consent was obtained.      He denies a personal or family history of anesthesia complications or bleeding disorders.     Patient Active Problem List   Diagnosis     Hearing loss     Family history of diabetes mellitus     Family history of other cardiovascular diseases     Cholesteatoma     Impotence of organic origin     Congenital abnormalities     Obesity     Acromegaly and Gigantism/ [place]'s     Major depressive disorder, recurrent episode, in full remission (H)     Hyperlipidemia LDL goal <100     Vitamin D deficiency     HTN, goal below 140/90     S/P colonoscopy     Diverticulosis of large intestine     Chronic ischemic heart disease     Ischemic heart disease due to coronary artery obstruction (H)     Hx of acute renal failure     S/P CABG (coronary artery bypass graft)     Abnormal chest x-ray     Disorder of kidney and ureter     Personal history of sudden cardiac arrest     Gastroesophageal reflux disease without esophagitis     Essential hypertension, benign     Controlled type 2 diabetes mellitus with complication, without long-term current use of insulin (H)     TERESSA (obstructive sleep apnea)        Past Medical History:   Diagnosis Date     Coronary artery disease 8/26/15    Tingling     Depressive disorder     Anxiety      Diabetes (H)      Fam hx-cardiovas dis NEC      Family history of diabetes mellitus      HEARING LOSS   Right ear, Hearing aid      Hypertension      Mixed hyperlipidemia      Sleep apnea         Past Surgical History:   Procedure Laterality Date     CARDIAC SURGERY  8/27/14    Open Heart Quadruple bypass     COLONOSCOPY  11/14/2013    Procedure: COLONOSCOPY;  Colonoscopy   ;  Surgeon: Shaggy Rhoades MD;  Location:  GI     [facility] CREATE EARDRUM OPENING,GEN ANESTH       VASCULAR SURGERY  8/27/2015    Quadrupple bypass       Social History   Substance Use Topics     Smoking status: Never Smoker     Smokeless tobacco: Never Used     Alcohol use 0.0 oz/week      Comment: 1 beer a day       Family History   Problem Relation Age of Onset     Diabetes Mother      HEART DISEASE Father      MI     C.A.D. Father      Cancer Sister      breast     HEART DISEASE Maternal Grandfather      Lipids Maternal Grandfather      Lipids Maternal Grandmother        Prior to Admission medications    Medication Sig Start Date End Date Taking? Authorizing Provider   aspirin 81 MG chewable tablet Take 2 tablets (162 mg) by mouth daily 12/30/16   Janis Marcial MD   atorvastatin (LIPITOR) 80 MG tablet Take 1 tablet (80 mg) by mouth daily 7/24/18   Janis Marcial MD   atorvastatin (LIPITOR) 80 MG tablet TAKE 1 TABLET EVERY DAY 7/19/18   Janis Marcial MD   blood glucose monitoring (NO BRAND SPECIFIED) meter device kit Use to test blood sugar 1 times daily or as directed. 7/19/18   Janis Marcial MD   blood glucose monitoring (ONE TOUCH ULTRASOFT) lancets Use to test blood sugars 1 times daily or as directed. 10/23/15   Janis Marcial MD   blood glucose monitoring (ONETOUCH ULTRA) test strip Use to test blood sugars 1 times daily or as directed. 7/19/18   Janis Marcial MD   blood glucose monitoring (ONETOUCH ULTRA) test strip USE TO TEST BLOOD SUGARS ONCE DAILY OR AS DIRECTED 6/4/18    Janis Marcial MD   Blood Glucose Monitoring Suppl (BLOOD GLUCOSE METER) KIT 1 kit continuous. 3/7/12   Janis Marcial MD   clotrimazole (LOTRIMIN) 1 % cream Apply topically 2 times daily APPLY TOPICALLY 2 TIMES DAILY 7/24/18   Janis Marcial MD   fenofibrate 160 MG tablet Take 1 tablet (160 mg) by mouth daily TAKE 1 TABLET EVERY DAY 7/24/18   Janis Marcial MD   hydrochlorothiazide (MICROZIDE) 12.5 MG capsule Take 1 capsule (12.5 mg) by mouth daily 7/24/18   Janis Marcial MD   hydrocortisone (ANUSOL-HC) 25 MG Suppository Place 1 suppository (25 mg) rectally 2 times daily 8/27/18   Janis Marcial MD   lisinopril (PRINIVIL/ZESTRIL) 40 MG tablet TAKE 1 TABLET EVERY DAY 6/25/18   Janis Marcial MD   metFORMIN (GLUCOPHAGE) 500 MG tablet Take 1 tablet (500 mg) by mouth 2 times daily (with meals) TAKE 1 TABLET TWICE DAILY WITH MEALS 7/24/18   Janis Marcial MD   metoprolol succinate (TOPROL-XL) 100 MG 24 hr tablet Take 1 tablet (100 mg) by mouth daily TAKE 1 TABLET EVERY DAY 7/24/18   Janis Marcial MD   nitroglycerin (NITROSTAT) 0.4 MG SL tablet For chest pain place 1 tablet under the tongue every 5 minutes for 3 doses. If symptoms persist 5 minutes after 1st dose call 911. 10/29/16   Janis Marcial MD   nystatin-triamcinolone (MYCOLOG II) cream APPLY TOPICALLY TWO TIMES DAILY, DO NOT USE FOR MORE THAN 14 DAYS WITHOUT BREAK 4/13/17   Janis Marcial MD   omeprazole (PRILOSEC) 40 MG capsule TAKE 1 CAPSULE EVERY DAY 30 - 60 MINUTES BEFORE A MEAL 7/24/18   Janis Marcial MD   PARoxetine (PAXIL) 20 MG tablet Take 1 tablet (20 mg) by mouth At Bedtime 7/24/18   Janis Marcial MD   polyethylene glycol (MIRALAX/GLYCOLAX) powder MIX 1/2 CAPFUL IN 4 OUNCES OF LIQUID AND DRINK DAILY AS NEEDED 7/27/18   Janis Marcial MD   psyllium 0.52 g capsule Take 1 capsule (0.52 g) by mouth daily 8/27/18   [place]  "Janis Barron MD       Allergies   Allergen Reactions     No Known Allergies         REVIEW OF SYSTEMS:   5 point ROS negative except as noted above in HPI, including Gen., Resp., CV, GI &  system review.    PHYSICAL EXAM:   There were no vitals taken for this visit. Estimated body mass index is 28.78 kg/(m^2) as calculated from the following:    Height as of 8/27/18: 1.93 m (6' 4\").    Weight as of 8/27/18: 107.2 kg (236 lb 6.4 oz).   GENERAL APPEARANCE: alert, and oriented  MENTAL STATUS: alert  AIRWAY EXAM: [place]ampatti Class I (visualization of the soft palate, fauces, uvula, anterior and posterior pillars)  RESP: lungs clear to auscultation - no rales, rhonchi or wheezes  CV: regular rates and rhythm  DIAGNOSTICS:    Not indicated    IMPRESSION   ASA Class 2 - Mild systemic disease    PLAN:   Plan for Colonoscopy with possible biopsy, possible polypectomy. We discussed the risks, benefits and alternatives and the patient wished to proceed.    The above has been forwarded to the consulting provider.      Signed Electronically by: Shaggy Rhoades  September 12, 2018          "

## 2018-09-12 NOTE — DISCHARGE INSTRUCTIONS
Understanding Colon and Rectal Polyps     The colon has a smooth lining composed of millions of cells.     The colon (also called the large intestine) is a muscular tube that forms the last part of the digestive tract. It absorbs water and stores food waste. The colon is about 4 to 6 feet long. The rectum is the last 6 inches of the colon. The colon and rectum have a smooth lining composed of millions of cells. Changes in these cells can lead to growths in the colon that can become cancerous and should be removed.     When the Colon Lining Changes  Changes that occur in the cells that line the colon or rectum can lead to growths called polyps. Over a period of years, polyps can turn cancerous. Removing polyps early may prevent cancer from ever forming.      Polyps  Polyps are fleshy clumps of tissue that form on the lining of the colon or rectum. Small polyps are usually benign (not cancerous). However, over time, cells in a polyp can change and become cancerous. The larger a polyp grows, the more likely this is to happen. Also, certain types of polyps known as adenomatous polyps are considered premalignant. This means that they will almost always become cancerous if they re not removed.          Cancer  Almost all colorectal cancers start when polyp cells begin growing abnormally. As a cancerous tumor grows, it may involve more and more of the colon or rectum. In time, cancer can also grow beyond the colon or rectum and spread to nearby organs or to glands called lymph nodes. The cells can also travel to other parts of the body. This is known as metastasis. The earlier a cancerous tumor is removed, the better the chance of preventing its spread.        2099-7031 [facility], [address] [place] [address], [place] [address]. All rights reserved. This information is not intended as a substitute for professional medical care. Always follow your healthcare professional's instructions.        Understanding Diverticulosis  and Diverticulitis     Pouches or diverticula usually occur in the lower part of the colon called the sigmoid.      Diverticulitis occurs when the pouches become inflamed.     The colon (large intestine) is the last part of the digestive tract. It absorbs water from stool and changes it from a liquid to a solid. In certain cases, small pouches called diverticula can form in the colon wall. This condition is called diverticulosis. The pouches can become infected. If this happens, it becomes a more serious problem called diverticulitis. These problems can be painful. But they can be managed.   Managing Your Condition  Diet changes or taking medications are often tried first. These may be enough to bring relief. If the case is bad, surgery may be done. You and your doctor can discuss the plan that is best for you.  If You Have Diverticulosis  Diet changes are often enough to control symptoms. The main changes are adding fiber (roughage) and drinking more water. Fiber absorbs water as it travels through your colon. This helps your stool stay soft and move smoothly. Water helps this process. If needed, you may be told to take over-the-counter stool softeners. To help relieve pain, antispasmodic medications may be prescribed.  If You Have Diverticulitis  Treatment depends on how bad your symptoms are.  For mild symptoms: You may be put on a liquid diet for a short time. You may also be prescribed antibiotics. If these two steps relieve your symptoms, you may then be prescribed a high-fiber diet. If you still have symptoms, your doctor will discuss further treatment options with you.  For severe symptoms: You may need to be admitted to the hospital. There, you can be given IV antibiotics and fluids. Once symptoms are under control, the above treatments may be tried. If these don t control your condition, your doctor may discuss the option of having surgery with you.  [facility] Colon Health  Help keep your colon healthy with  a diet that includes plenty of high-fiber fruits, vegetables, and whole grains. Drink plenty of liquids like water and juice. Your doctor may also recommend avoiding seeds and nuts.          [id number] [employer], [address] [place] [address], [place] [address]. All rights reserved. This information is not intended as a substitute for professional medical care. Always follow your healthcare professional's instructions.        HIGH FIBER DIET  Fiber is present in all fruits, vegetables, cereals and grains. Fiber passes through the body undigested. A high fiber diet helps food move through the intestinal tract. The added bulk is helpful in preventing constipation. In people with diverticulosis it serves to clean out the pouches along the colon wall while preventing new ones from forming. A high fiber diet also reduces the risk of colon cancer, decreases blood cholesterol and prevents high blood sugar in people with diabetes.    The foods listed below are high in fiber and should be included in your diet. If you are not used to high fiber foods, start with 1 or 2 foods from this list. Every 3-4 days add a new one to your diet until you are eating 4 high fiber foods per day. This should give you 20-35 Gm of fiber/day. It is also important to drink a lot of water when you are on this diet (6-8 glasses a day). Water causes the fiber to swell and increases the benefit.    FOODS HIGH IN DIETARY FIBER:  BREADS: Made with 100% whole wheat flour; shahid, wheat or rye crackers; tortillas, bran muffins  CEREALS: Whole grain cereal with bran (Chex, Raisin Bran, Corn Bran), oatmeal, rolled oats, granola, wheat flakes, brown rice  NUTS: Any nuts  FRUITS: All fresh fruits along with edible skins, (bananas, citrus fruit, mangoes, pears, prunes, raisins, apples, pineapple, apricot, melon, jams and marmalades), fruit juices (especially prune juice)  VEGETABLES: All types, preferably raw or lightly cooked: especially, celery, eggplant,  potatoes, spinach, broccoli, brussel sprouts, winter squash, carrots, cauliflower, soybeans, lentils, fresh and dried beans of all kinds  OTHER: Popcorn, any spices      [id number] [place] [address] [address]. All rights reserved. This information is not intended as a substitute for professional medical care. Always follow your healthcare professional's instructions.

## 2018-09-12 NOTE — LETTER
August 30, 2018      Edd Fong  [address] [facility] [id number]        Dear Edd,     Please continue to take your daily Miralax up until the day before your procedure.  Thank you for choosing [facility]. You are scheduled for the following service.   Date:  9-12-18             Procedure:  COLONOSCOPY  Doctor:        [place]   Arrival Time:  1100   *check in at Emergency/Endoscopy desk*  Procedure Time: 1130     Location:   [facility]        Endoscopy Department, First Floor (Enter through ER Doors) *        [address] [place], [place] [address]      [phone] or [phone] () to reschedule      Colonoscopy is the most accurate test to detect colon polyps and colon cancer; and the only test where polyps can be removed. During this procedure, a doctor examines the lining of your large intestine and rectum through a flexible tube.     Transportation  Arrange for a ride for the day of your procedure with a responsible adult.  A taxi ride is not an option unless you are accompanied by a responsible adult. If you fail to arrange transportation with a responsible adult, your procedure will be cancelled and rescheduled.    MIRALAX -GATORADE  PREP  (without magnesium citrate)    Purchase the  following supplies at your local pharmacy:  - 2 (two) bisacodyl tablets: each tablet contains 5 mg.  (Dulcolax  laxative NOT Dulcolax  stool softener)   - 1 (one) 8.3 oz bottle of Polyethylene Glycol (PEG) 3350 Powder   (MiraLAX , Smooth LAX , ClearLAX  or equivalent)  - 64 oz Gatorade    Regular Gatorade, Gatorade G2 , Powerade , Powerade Zero  or Pedialyte  is acceptable. Red colored flavors are not allowed; all other colors (yellow, green, orange, purple and blue) are okay. It is also okay to buy two 2.12 oz packets of powdered Gatorade that can be mixed with water to a total volume of 64 oz of liquid.  PREPARATION FOR COLONOSCOPY  7 days  before:    Discontinue fiber supplements and medications containing iron. This includes Metamucil  and Fibercon ; and multivitamins with iron.  3 days before:    Begin a low-fiber diet. A low-fiber diet helps making the cleanout more effective.     Examples of a low-fiber diet include (but are not limited to): white bread, white rice, pasta, crackers, fish, chicken, eggs, ground beef, creamy peanut butter, cooked/steamed/boiled vegetables, canned fruit, bananas, melons, milk, plain yogurt cheese, salad dressing and other condiments.     The following are not allowed on a low-fiber diet: seeds, nuts, popcorn, bran, whole wheat, corn, quinoa, raw fruits and vegetables, berries and dried fruit, beans and lentils.    For additional details on low-fiber diet, please refer to the table on the last page.  2 days before:    Continue the low-fiber diet.     Drink at least 8 glasses of water throughout the day.     Stop eating solid foods at 11:45 pm.  1 day before:    In the morning: begin a clear liquid diet (liquids you can see through).     Examples of a clear liquid diet include: water, clear broth or bouillon, Gatorade, Pedialyte or Powerade, carbonated and non-carbonated soft drinks (Sprite , 7-Up , ginger ale), strained fruit juices without pulp (apple, white grape, white cranberry), Jell-O  and popsicles.     The following are not allowed on a clear liquid diet: red liquids, alcoholic beverages,  dairy products (milk, creamer, and yogurt), protein shakes,  juice with pulp and chewing tobacco.    At noon: take 2 (two) bisacodyl tablets     At 4 (and no later than 6pm): start drinking the Miralax-Gatorade preparation (8.3 oz of Miralax mixed with 64 oz of Gatorade in a large pitcher). Drink 1(one) 8 oz glass every 15 minutes thereafter, until the mixture is gone.    COLON CLEANSING TIPS: drink adequate amounts of fluids before and after your colon cleansing to prevent dehydration. Stay near a toilet because you will  have diarrhea. Even if you are sitting on the toilet, continue to drink the cleansing solution every 15 minutes. If you feel nauseous or vomit, rinse your mouth with water, take a 15 to 30-minute-break and then continue drinking the solution. You will be uncomfortable until the stool has flushed from your colon (in about 2 to 4 hours). You may feel chilled.    Day of your procedure  You may take all of your morning medications including blood pressure medications, blood thinners (if you have not been instructed to stop these by our office), methadone, anti-seizure medications with sips of water 3 hours prior to your procedure or earlier. Do not take insulin or vitamins prior to your procedure. Continue the clear liquid diet.   4 hours prior:     STOP consuming all liquids     Do not take anything by mouth during this time.     Allow extra time to travel to your procedure as you may need to stop and use a restroom along the way.  You are ready for the procedure, if you followed all instructions and your stool is no longer formed, but clear or yellow liquid. If you are unsure whether your colon is clean, please call our office at [phone] before you leave for your appointment.  Bring the following to your procedure:  - Insurance Card/Photo ID.   - List of current medications including over-the-counter medications and supplements.   - Your rescue inhaler if you currently use one to control asthma.    Canceling or rescheduling your appointment:   If you must cancel or reschedule your appointment, please call [phone] as soon as possible.      COLONOSCOPY PRE-PROCEDURE CHECKLIST  If you have diabetes, ask your regular doctor for diet and medication restrictions.  If you take an anticoagulant or anti-platelet medication (such as Coumadin , Lovenox , Pradaxa , Xarelto , Eliquis , etc.), please call your primary doctor for advice on holding this medication.  If you take aspirin you may continue to do so.  If you are  or may be pregnant, please discuss the risks and benefits of this procedure with your doctor.    What happens during a colonoscopy?  Plan to spend up to two hours, starting at registration time, at the endoscopy center the day of your procedure. The colonoscopy takes an average of 15 to 30 minutes. Recovery time is about 30 minutes.   Before the exam:    You will change into a gown.    Your medical history and medication list will be reviewed with you, unless that has been done over the phone prior to the procedure.     A nurse will insert an intravenous (IV) line into your hand or arm.    The doctor will meet with you and will give you a consent form to sign.  During the exam:     Medicine will be given through the IV line to help you relax.     Your heart rate and oxygen levels will be monitored. If your blood pressure is low, you may be given fluids through the IV line.     The doctor will insert a flexible hollow tube, called a colonoscope, into your rectum. The scope will be advanced slowly through the large intestine (colon).    You may have a feeling of fullness or pressure.     If an abnormal tissue or a polyp is found, the doctor may remove it through the endoscope for closer examination, or biopsy. Tissue removal is painless  After the exam:           Any tissue samples removed during the exam will be sent to a lab for evaluation. It may take 5-7 working days for you to be notified of the results.     A nurse will provide you with complete discharge instructions before you leave the endoscopy center. Be sure to ask the nurse for specific instructions if you take blood thinners such as Aspirin, Coumadin or Plavix.     The doctor will prepare a full report for you and for the physician who referred you for the procedure.     Your doctor will talk with you about the initial results of your exam.      Medication given during the exam will prohibit you from driving for the rest of the day.     Following the  exam, you may resume your normal diet. Your first meal should be light, no greasy foods. Avoid alcohol until the next day.     You may resume your regular activities the day after the procedure.       LOW-FIBER DIET  Foods RECOMMENDED Foods to AVOID   Breads, Cereal, Rice and Pasta:   White bread, rolls, biscuits, croissant and lorin toast.   Waffles, Slovak toast and pancakes.   White rice, noodles, pasta, macaroni and peeled cooked potatoes.   Plain crackers and saltines.   Cooked cereals: farina, cream of rice.   Cold cereals: Puffed Rice , Rice Krispies , Corn Flakes  and Special K    Breads, Cereal, Rice and Pasta:   Breads or rolls with nuts, seeds or fruit.   Whole wheat, pumpernickel, rye breads and cornbread.   Potatoes with skin, brown or wild rice, and kasha (buckwheat).     Vegetables:   Tender cooked and canned vegetables without seeds: carrots, asparagus tips, green or wax beans, pumpkin, spinach, lima beans. Vegetables:   Raw or steamed vegetables.   Vegetables with seeds.   Sauerkraut.   Winter squash, peas, broccoli, Brussel sprouts, cabbage, onions, cauliflower, baked beans, peas and corn.   Fruits:   Strained fruit juice.   Canned fruit, except pineapple.   Ripe bananas and melon. Fruits:   Prunes and prune juice.   Raw fruits.   Dried fruits: figs, dates and raisins.   Milk/Dairy:   Milk: plain or flavored.   Yogurt, custard and ice cream.   Cheese and cottage cheese Milk/Dairy:     Meat and other proteins:   ground, well-cooked tender beef, lamb, ham, veal, pork, fish, poultry and organ meats.   Eggs.   Peanut butter without nuts. Meat and other proteins:   Tough, fibrous meats with gristle.   Dry beans, peas and lentils.   Peanut butter with nuts.   Tofu.   Fats, Snack, Sweets, Condiments and Beverages:   Margarine, butter, oils, mayonnaise, sour cream and salad dressing, plain gravy.   Sugar, hard candy, clear jelly, honey and syrup.   Spices, cooked herbs, bouillon, broth and soups made with  allowed vegetable, ketchup and mustard.   Coffee, tea and carbonated drinks.   Plain cakes, cookies and pretzels.   Gelatin, plain puddings, custard, ice cream, sherbet and popsicles. Fats, Snack, Sweets, Condiments and Beverages:   Nuts, seeds and coconut.   Jam, marmalade and preserves.   Pickles, olives, relish and horseradish.   All desserts containing nuts, seeds, dried fruit and coconut; or made from whole grains or bran.   Candy made with nuts or seeds.   Popcorn.       DIRECTIONS TO THE ENDOSCOPY DEPARTMENT    From the north ([facility])  Take [address] South, exit on [address] . Get into the left hand sam, turn left (east), go one-half mile to [address] and turn left. Go north to the first stoplight, take a right on [address] and follow it to the Emergency entrance.    From the south ([facility])  Take [address]N to the [address]E split and exit on [address] . On [address] , turn left (west) to [address]. Turn right (north) on [address]. Go north to the first stoplight, take a right on [address] and follow it to the Emergency entrance.    From the east via 35E ([facility])  Take [address] south to [place] [address] exit. Turn right on [address]. Go west to [address]. Turn right (north) on [address]. Go to the first stoplight, take a right and follow on [address] to the Emergency entrance.    From the east via [address] ([facility])  Take [address] to [address]. Turn left (south) on [address] to [address]. Turn left (east) on [address] and follow it to the Emergency entrance.    From the west via [address] ([place], [place])  Take [address] east to [address]. Turn right (south) on [address] to [address]. Turn left (east) on [address] and follow it to the Emergency entrance.

## 2018-09-12 NOTE — IP AVS SNAPSHOT
MRN:[id number]                      After Visit Summary   9/12/2018    Edd Fong    MRN: [id number]           Thank you!     Thank you for choosing [facility] for your care. Our goal is always to provide you with excellent care. Hearing back from our patients is one way we can continue to improve our services. Please take a few minutes to complete the written survey that you may receive in the mail after you visit. If you would like to speak to someone directly about your visit please contact Patient Relations at [phone]. Thank you!          Patient Information     Date Of Birth          1965        About your hospital stay     You were admitted on:  September 12, 2018 You last received care in the:  [facility]    You were discharged on:  September 12, 2018       Who to Call     For medical emergencies, please call 911.  For non-urgent questions about your medical care, please call your primary care provider or clinic, [phone]  For questions related to your surgery, please call your surgery clinic        Attending Provider     Provider Specialty    Shaggy Rhoades MD Gastroenterology       Primary Care Provider Office Phone # Fax #    Janis Marcial [phone]      Your next 10 appointments already scheduled     Sep 25, 2018  8:15 AM CDT   LAB with [facility] LAB   [facility]    [address] [place] [address], [address]  [facility] [address] [phone]           Please do not eat 10-12 hours before your appointment if you are coming in fasting for labs on lipids, cholesterol, or glucose (sugar). This does not apply to pregnant women. Water, hot tea and black coffee (with nothing added) are okay. Do not drink other fluids, diet soda or chew gum.            Feb 25, 2019  8:00 AM CST   SHORT with Janis Marcial MD   [facility]     [address] [facility], [address]  [facility] [address] [phone]              Further instructions from your care team         Understanding Colon and Rectal Polyps     The colon has a smooth lining composed of millions of cells.     The colon (also called the large intestine) is a muscular tube that forms the last part of the digestive tract. It absorbs water and stores food waste. The colon is about 4 to 6 feet long. The rectum is the last 6 inches of the colon. The colon and rectum have a smooth lining composed of millions of cells. Changes in these cells can lead to growths in the colon that can become cancerous and should be removed.     When the Colon Lining Changes  Changes that occur in the cells that line the colon or rectum can lead to growths called polyps. Over a period of years, polyps can turn cancerous. Removing polyps early may prevent cancer from ever forming.      Polyps  Polyps are fleshy clumps of tissue that form on the lining of the colon or rectum. Small polyps are usually benign (not cancerous). However, over time, cells in a polyp can change and become cancerous. The larger a polyp grows, the more likely this is to happen. Also, certain types of polyps known as adenomatous polyps are considered premalignant. This means that they will almost always become cancerous if they re not removed.          Cancer  Almost all colorectal cancers start when polyp cells begin growing abnormally. As a cancerous tumor grows, it may involve more and more of the colon or rectum. In time, cancer can also grow beyond the colon or rectum and spread to nearby organs or to glands called lymph nodes. The cells can also travel to other parts of the body. This is known as metastasis. The earlier a cancerous tumor is removed, the better the chance of preventing its spread.        [id number] [employer], [address] [facility], [place] [address]. All rights reserved. This information is not intended as a  substitute for professional medical care. Always follow your healthcare professional's instructions.        Understanding Diverticulosis and Diverticulitis     Pouches or diverticula usually occur in the lower part of the colon called the sigmoid.      Diverticulitis occurs when the pouches become inflamed.     The colon (large intestine) is the last part of the digestive tract. It absorbs water from stool and changes it from a liquid to a solid. In certain cases, small pouches called diverticula can form in the colon wall. This condition is called diverticulosis. The pouches can become infected. If this happens, it becomes a more serious problem called diverticulitis. These problems can be painful. But they can be managed.   Managing Your Condition  Diet changes or taking medications are often tried first. These may be enough to bring relief. If the case is bad, surgery may be done. You and your doctor can discuss the plan that is best for you.  If You Have Diverticulosis  Diet changes are often enough to control symptoms. The main changes are adding fiber (roughage) and drinking more water. Fiber absorbs water as it travels through your colon. This helps your stool stay soft and move smoothly. Water helps this process. If needed, you may be told to take over-the-counter stool softeners. To help relieve pain, antispasmodic medications may be prescribed.  If You Have Diverticulitis  Treatment depends on how bad your symptoms are.  For mild symptoms: You may be put on a liquid diet for a short time. You may also be prescribed antibiotics. If these two steps relieve your symptoms, you may then be prescribed a high-fiber diet. If you still have symptoms, your doctor will discuss further treatment options with you.  For severe symptoms: You may need to be admitted to the hospital. There, you can be given IV antibiotics and fluids. Once symptoms are under control, the above treatments may be tried. If these don t control  your condition, your doctor may discuss the option of having surgery with you.  [place] to Colon Health  Help keep your colon healthy with a diet that includes plenty of high-fiber fruits, vegetables, and whole grains. Drink plenty of liquids like water and juice. Your doctor may also recommend avoiding seeds and nuts.          [id number] [facility] [facility], [address] [place] [address], [place] [address]. All rights reserved. This information is not intended as a substitute for professional medical care. Always follow your healthcare professional's instructions.        HIGH FIBER DIET  Fiber is present in all fruits, vegetables, cereals and grains. Fiber passes through the body undigested. A high fiber diet helps food move through the intestinal tract. The added bulk is helpful in preventing constipation. In people with diverticulosis it serves to clean out the pouches along the colon wall while preventing new ones from forming. A high fiber diet also reduces the risk of colon cancer, decreases blood cholesterol and prevents high blood sugar in people with diabetes.    The foods listed below are high in fiber and should be included in your diet. If you are not used to high fiber foods, start with 1 or 2 foods from this list. Every 3-4 days add a new one to your diet until you are eating 4 high fiber foods per day. This should give you 20-35 Gm of fiber/day. It is also important to drink a lot of water when you are on this diet (6-8 glasses a day). Water causes the fiber to swell and increases the benefit.    FOODS HIGH IN DIETARY FIBER:  BREADS: Made with 100% whole wheat flour; shahid, wheat or rye crackers; tortillas, bran muffins  CEREALS: Whole grain cereal with bran (Chex, Raisin Bran, Corn Bran), oatmeal, rolled oats, granola, wheat flakes, brown rice  NUTS: Any nuts  FRUITS: All fresh fruits along with edible skins, (bananas, citrus fruit, mangoes, pears, prunes, raisins, apples, pineapple, apricot, melon, jams and  marmalades), fruit juices (especially prune juice)  VEGETABLES: All types, preferably raw or lightly cooked: especially, celery, eggplant, potatoes, spinach, broccoli, brussel sprouts, winter squash, carrots, cauliflower, soybeans, lentils, fresh and dried beans of all kinds  OTHER: Popcorn, any spices      [id number] [facility], [address] [place] [address], [place] [address]. All rights reserved. This information is not intended as a substitute for professional medical care. Always follow your healthcare professional's instructions.    Pending Results     No orders found from 9/10/2018 to 9/13/2018.            Admission Information     Date & Time Provider Department Dept. Phone    9/12/2018 Shaggy Rhoades MD [facility] [phone]      Your Vitals Were     Blood Pressure Respirations Pulse Oximetry             155/86 20 96%         MyChart Information     [facility] gives you secure access to your electronic health record. If you see a primary care provider, you can also send messages to your care team and make appointments. If you have questions, please call your primary care clinic.  If you do not have a primary care provider, please call [phone] and they will assist you.        Care EveryWhere ID     This is your Care EveryWhere ID. This could be used by other organizations to access your [place] medical records  [id number]        Equal Access to Services     PAUL HERCULES AH: Hadii jasbir alcantarao Sofidelali, waaxda luqadaha, qaybta kaalmada ana luisa, gray plasencia. So [facility] [phone].    ATENCIÓN: Si habla español, tiene a hough disposición servicios gratuitos de asistencia lingüística. Llame al [phone].    We comply with applicable federal civil rights laws and [place] laws. We do not discriminate on the basis of race, color, national origin, age, disability, sex, sexual orientation, or gender identity.               Review of your medicines      CONTINUE  these medicines which have NOT CHANGED        Dose / Directions    aspirin 81 MG chewable tablet   Used for:  Ischemic heart disease due to coronary artery obstruction (H)        Dose:  162 mg   Take 2 tablets (162 mg) by mouth daily   Quantity:  180 tablet   Refills:  3       * atorvastatin 80 MG tablet   Commonly known as:  LIPITOR   Used for:  Hyperlipidemia LDL goal <100        TAKE 1 TABLET EVERY DAY   Quantity:  90 tablet   Refills:  0       * atorvastatin 80 MG tablet   Commonly known as:  LIPITOR   Used for:  Hyperlipidemia LDL goal <100        Dose:  80 mg   Take 1 tablet (80 mg) by mouth daily   Quantity:  90 tablet   Refills:  1       blood glucose monitoring lancets   Used for:  Type 2 diabetes mellitus without complication (H)        Use to test blood sugars 1 times daily or as directed.   Quantity:  1 Box   Refills:  11       * blood glucose monitoring meter device kit   Commonly known as:  no brand specified   Used for:  Type 2 diabetes, HbA1C goal < 8% (H)        Dose:  1 kit   1 kit continuous.   Quantity:  1 kit   Refills:  0       * blood glucose monitoring meter device kit   Commonly known as:  no brand specified   Used for:  Type 2 diabetes, HbA1C goal < 8% (H), Controlled type 2 diabetes mellitus with complication, without long-term current use of insulin (H)        Use to test blood sugar 1 times daily or as directed.   Quantity:  1 kit   Refills:  0       * blood glucose monitoring test strip   Commonly known as:  ONETOUCH ULTRA   Used for:  Controlled type 2 diabetes mellitus with complication, without long-term current use of insulin (H)        USE TO TEST BLOOD SUGARS ONCE DAILY OR AS DIRECTED   Quantity:  50 each   Refills:  3       * blood glucose monitoring test strip   Commonly known as:  ONETOUCH ULTRA   Used for:  Controlled type 2 diabetes mellitus with complication, without long-term current use of insulin (H), Type 2 diabetes, HbA1C goal < 8% (H)        Use to test blood sugars 1  times daily or as directed.   Quantity:  3 Box   Refills:  1       clotrimazole 1 % cream   Commonly known as:  LOTRIMIN   Used for:  Tinea pedis of both feet        Apply topically 2 times daily APPLY TOPICALLY 2 TIMES DAILY   Quantity:  60 g   Refills:  3       fenofibrate 160 MG tablet   Used for:  Hyperlipidemia LDL goal <100        Dose:  160 mg   Take 1 tablet (160 mg) by mouth daily TAKE 1 TABLET EVERY DAY   Quantity:  90 tablet   Refills:  1       hydrochlorothiazide 12.5 MG capsule   Commonly known as:  MICROZIDE   Used for:  HTN, goal below 140/90        Dose:  12.5 mg   Take 1 capsule (12.5 mg) by mouth daily   Quantity:  90 capsule   Refills:  1       hydrocortisone 25 MG Suppository   Commonly known as:  ANUSOL-HC   Used for:  External hemorrhoids        Dose:  25 mg   Place 1 suppository (25 mg) rectally 2 times daily   Quantity:  12 suppository   Refills:  1       lisinopril 40 MG tablet   Commonly known as:  PRINIVIL/ZESTRIL   Used for:  HTN, goal below 140/90        TAKE 1 TABLET EVERY DAY   Quantity:  90 tablet   Refills:  1       metFORMIN 500 MG tablet   Commonly known as:  GLUCOPHAGE   Used for:  Controlled type 2 diabetes mellitus with complication, without long-term current use of insulin (H)        Dose:  500 mg   Take 1 tablet (500 mg) by mouth 2 times daily (with meals) TAKE 1 TABLET TWICE DAILY WITH MEALS   Quantity:  180 tablet   Refills:  0       metoprolol succinate 100 MG 24 hr tablet   Commonly known as:  TOPROL-XL   Used for:  Essential hypertension, benign        Dose:  100 mg   Take 1 tablet (100 mg) by mouth daily TAKE 1 TABLET EVERY DAY   Quantity:  90 tablet   Refills:  1       nitroGLYcerin 0.4 MG sublingual tablet   Commonly known as:  NITROSTAT   Used for:  Ischemic heart disease due to coronary artery obstruction (H)        For chest pain place 1 tablet under the tongue every 5 minutes for 3 doses. If symptoms persist 5 minutes after 1st dose call 911.   Quantity:  25 tablet    Refills:  0       nystatin-triamcinolone cream   Commonly known as:  MYCOLOG II   Used for:  Tinea cruris        APPLY TOPICALLY TWO TIMES DAILY, DO NOT USE FOR MORE THAN 14 DAYS WITHOUT BREAK   Quantity:  60 g   Refills:  2       omeprazole 40 MG capsule   Commonly known as:  priLOSEC   Used for:  Gastroesophageal reflux disease without esophagitis        TAKE 1 CAPSULE EVERY DAY 30 - 60 MINUTES BEFORE A MEAL   Quantity:  90 capsule   Refills:  1       PARoxetine 20 MG tablet   Commonly known as:  PAXIL   Used for:  Major depressive disorder, recurrent episode, in full remission (H)        Dose:  20 mg   Take 1 tablet (20 mg) by mouth At Bedtime   Quantity:  90 tablet   Refills:  1       polyethylene glycol powder   Commonly known as:  MIRALAX/GLYCOLAX   Used for:  Hyperlipidemia LDL goal <100        MIX 1/2 CAPFUL IN 4 OUNCES OF LIQUID AND DRINK DAILY AS NEEDED   Quantity:  1054 g   Refills:  3       psyllium 0.52 g capsule   Used for:  External hemorrhoids        Dose:  1 capsule   Take 1 capsule (0.52 g) by mouth daily   Quantity:  100 capsule   Refills:  3       * Notice:  This list has 6 medication(s) that are the same as other medications prescribed for you. Read the directions carefully, and ask your doctor or other care provider to review them with you.             Protect others around you: Learn how to safely use, store and throw away your medicines at www.disposemymeds.org.             Medication List: This is a list of all your medications and when to take them. Check marks below indicate your daily home schedule. Keep this list as a reference.      Medications           Morning Afternoon Evening Bedtime As Needed    aspirin 81 MG chewable tablet   Take 2 tablets (162 mg) by mouth daily                                * atorvastatin 80 MG tablet   Commonly known as:  LIPITOR   TAKE 1 TABLET EVERY DAY                                * atorvastatin 80 MG tablet   Commonly known as:  LIPITOR   Take 1  tablet (80 mg) by mouth daily                                blood glucose monitoring lancets   Use to test blood sugars 1 times daily or as directed.                                * blood glucose monitoring meter device kit   Commonly known as:  no brand specified   1 kit continuous.                                * blood glucose monitoring meter device kit   Commonly known as:  no brand specified   Use to test blood sugar 1 times daily or as directed.                                * blood glucose monitoring test strip   Commonly known as:  ONETOUCH ULTRA   USE TO TEST BLOOD SUGARS ONCE DAILY OR AS DIRECTED                                * blood glucose monitoring test strip   Commonly known as:  ONETOUCH ULTRA   Use to test blood sugars 1 times daily or as directed.                                clotrimazole 1 % cream   Commonly known as:  LOTRIMIN   Apply topically 2 times daily APPLY TOPICALLY 2 TIMES DAILY                                fenofibrate 160 MG tablet   Take 1 tablet (160 mg) by mouth daily TAKE 1 TABLET EVERY DAY                                hydrochlorothiazide 12.5 MG capsule   Commonly known as:  MICROZIDE   Take 1 capsule (12.5 mg) by mouth daily                                hydrocortisone 25 MG Suppository   Commonly known as:  ANUSOL-HC   Place 1 suppository (25 mg) rectally 2 times daily                                lisinopril 40 MG tablet   Commonly known as:  PRINIVIL/ZESTRIL   TAKE 1 TABLET EVERY DAY                                metFORMIN 500 MG tablet   Commonly known as:  GLUCOPHAGE   Take 1 tablet (500 mg) by mouth 2 times daily (with meals) TAKE 1 TABLET TWICE DAILY WITH MEALS                                metoprolol succinate 100 MG 24 hr tablet   Commonly known as:  TOPROL-XL   Take 1 tablet (100 mg) by mouth daily TAKE 1 TABLET EVERY DAY                                nitroGLYcerin 0.4 MG sublingual tablet   Commonly known as:  NITROSTAT   For chest pain place 1 tablet  under the tongue every 5 minutes for 3 doses. If symptoms persist 5 minutes after 1st dose call 911.                                nystatin-triamcinolone cream   Commonly known as:  MYCOLOG II   APPLY TOPICALLY TWO TIMES DAILY, DO NOT USE FOR MORE THAN 14 DAYS WITHOUT BREAK                                omeprazole 40 MG capsule   Commonly known as:  priLOSEC   TAKE 1 CAPSULE EVERY DAY 30 - 60 MINUTES BEFORE A MEAL                                PARoxetine 20 MG tablet   Commonly known as:  PAXIL   Take 1 tablet (20 mg) by mouth At Bedtime                                polyethylene glycol powder   Commonly known as:  MIRALAX/GLYCOLAX   MIX 1/2 CAPFUL IN 4 OUNCES OF LIQUID AND DRINK DAILY AS NEEDED                                psyllium 0.52 g capsule   Take 1 capsule (0.52 g) by mouth daily                                * Notice:  This list has 6 medication(s) that are the same as other medications prescribed for you. Read the directions carefully, and ask your doctor or other care provider to review them with you.

## 2018-09-13 ENCOUNTER — TELEPHONE (OUTPATIENT)
Dept: FAMILY MEDICINE | Facility: CLINIC | Age: 53
End: 2018-09-13

## 2018-09-13 LAB — COPATH REPORT: NORMAL

## 2018-09-13 NOTE — TELEPHONE ENCOUNTER
Pt calls, discussed at length, had colonoscopy yesterday, told no issue with bleed, only start iron 9/13 due to colonoscopy prep, pt admits to not eating iron in diet, rescheduled lab only for one month after taking iron  Anna Wayne RN, BSN  Message handled by Nurse Triage.

## 2018-09-24 ENCOUNTER — TELEPHONE (OUTPATIENT)
Dept: FAMILY MEDICINE | Facility: CLINIC | Age: 53
End: 2018-09-24

## 2018-09-24 NOTE — TELEPHONE ENCOUNTER
Panel Management Review      Patient has the following on his problem list:     Depression / Dysthymia review    Measure:  Needs PHQ-9 score of 4 or less during index window.  Administer PHQ-9 and if score is 5 or more, send encounter to provider for next steps.    5 - 7 month window range: PHQ-9 due NOW    PHQ-9 SCORE 8/17/2017 3/6/2018 3/17/2018   Total Score - - -   Total Score MyChart - - 0   Total Score 0 0 0       If PHQ-9 recheck is 5 or more, route to provider for next steps.    Patient is due for:  PHQ9    Diabetes    ASA: Passed    Last A1C  Lab Results   Component Value Date    A1C 6.5 08/27/2018    A1C 6.6 03/19/2018    A1C 6.6 01/16/2018    A1C 6.3 08/02/2017    A1C 6.2 02/10/2017     A1C tested: FAILED    Last LDL:    Lab Results   Component Value Date    CHOL 191 08/01/2018     Lab Results   Component Value Date    HDL 31 08/01/2018     Lab Results   Component Value Date     08/01/2018     Lab Results   Component Value Date    TRIG 168 08/01/2018     Lab Results   Component Value Date    CHOLHDLRATIO 4.7 09/10/2015     Lab Results   Component Value Date    NHDL 160 08/01/2018       Is the patient on a Statin? YES             Is the patient on Aspirin? YES    Medications     HMG CoA Reductase Inhibitors    atorvastatin (LIPITOR) 80 MG tablet    atorvastatin (LIPITOR) 80 MG tablet    Salicylates    aspirin 81 MG chewable tablet          Last three blood pressure readings:  BP Readings from Last 3 Encounters:   09/12/18 132/71   08/27/18 132/72   01/29/18 138/80       Date of last diabetes office visit: 08/27/18     Tobacco History:     History   Smoking Status     Never Smoker   Smokeless Tobacco     Never Used         Hypertension   Last three blood pressure readings:  BP Readings from Last 3 Encounters:   09/12/18 132/71   08/27/18 132/72   01/29/18 138/80     Blood pressure: Passed    HTN Guidelines:  Age 18-59 BP range:  Less than 140/90  Age 60-85 with Diabetes:  Less than 140/90  Age  60-85 without Diabetes:  less than 150/90      Composite cancer screening  Chart review shows that this patient is due/due soon for the following None  Summary:    Patient is due/failing the following:   PHQ9    Action needed:   Patient needs to do PHQ9.    Type of outreach:    Sent [employer] message.    Questions for provider review:    None                                                                                                                                    Lisa Magill, CMA       Chart routed to Care Team .

## 2018-10-01 NOTE — TELEPHONE ENCOUNTER
Patient completed the PHQ-9 questionnaire through MY CHART.    PHQ-9 score:    PHQ-9 SCORE 9/27/2018   Total Score -   Total Score MyChart 0   Total Score 0     Lisa Magill, CMA

## 2018-10-15 DIAGNOSIS — D64.9 ANEMIA, UNSPECIFIED TYPE: ICD-10-CM

## 2018-10-15 LAB
ERYTHROCYTE [DISTWIDTH] IN BLOOD BY AUTOMATED COUNT: 12.7 % (ref 10–15)
HCT VFR BLD AUTO: 37.9 % (ref 40–53)
HGB BLD-MCNC: 12.5 G/DL (ref 13.3–17.7)
MCH RBC QN AUTO: 30.2 PG (ref 26.5–33)
MCHC RBC AUTO-ENTMCNC: 33 G/DL (ref 31.5–36.5)
MCV RBC AUTO: 92 FL (ref 78–100)
PLATELET # BLD AUTO: 188 10E9/L (ref 150–450)
RBC # BLD AUTO: 4.14 10E12/L (ref 4.4–5.9)
WBC # BLD AUTO: 5.2 10E9/L (ref 4–11)

## 2018-10-15 PROCEDURE — 82728 ASSAY OF FERRITIN: CPT | Performed by: FAMILY MEDICINE

## 2018-10-15 PROCEDURE — 36415 COLL VENOUS BLD VENIPUNCTURE: CPT | Performed by: FAMILY MEDICINE

## 2018-10-15 PROCEDURE — 83550 IRON BINDING TEST: CPT | Performed by: FAMILY MEDICINE

## 2018-10-15 PROCEDURE — 83540 ASSAY OF IRON: CPT | Performed by: FAMILY MEDICINE

## 2018-10-15 PROCEDURE — 85027 COMPLETE CBC AUTOMATED: CPT | Performed by: FAMILY MEDICINE

## 2018-10-16 LAB
FERRITIN SERPL-MCNC: 49 NG/ML (ref 26–388)
IRON SATN MFR SERPL: 23 % (ref 15–46)
IRON SERPL-MCNC: 81 UG/DL (ref 35–180)
TIBC SERPL-MCNC: 357 UG/DL (ref 240–430)

## 2018-10-17 ENCOUNTER — TELEPHONE (OUTPATIENT)
Dept: FAMILY MEDICINE | Facility: CLINIC | Age: 53
End: 2018-10-17

## 2018-10-17 NOTE — TELEPHONE ENCOUNTER
Patient called to let Dr. Marcial know he made an appointment with Dr. Ramakrishna Gimenez at [facility] for 10/24.

## 2018-10-20 ENCOUNTER — MYC REFILL (OUTPATIENT)
Dept: FAMILY MEDICINE | Facility: CLINIC | Age: 53
End: 2018-10-20

## 2018-10-20 DIAGNOSIS — E78.5 HYPERLIPIDEMIA LDL GOAL <100: ICD-10-CM

## 2018-10-22 RX ORDER — ATORVASTATIN CALCIUM 80 MG/1
80 TABLET, FILM COATED ORAL DAILY
Qty: 90 TABLET | Refills: 1 | Status: SHIPPED | OUTPATIENT
Start: 2018-10-22 | End: 2019-03-15

## 2018-10-22 NOTE — TELEPHONE ENCOUNTER
"Requested Prescriptions   Pending Prescriptions Disp Refills     atorvastatin (LIPITOR) 80 MG tablet 90 tablet 1    Last Written Prescription Date:  7/24/18  Last Fill Quantity: 90,  # refills: 1   Last Office Visit: 8/27/2018 [place]       Return in about 6 months (around 2/27/2019) for diabetes check, weight check, fasting labs.     Future Office Visit:      Sig: Take 1 tablet (80 mg) by mouth daily    Statins Protocol Passed    10/22/2018 10:35 AM       Passed - LDL on file in past 12 months    Recent Labs   Lab Test  08/01/18   0803   LDL  126*            Passed - No abnormal creatine kinase in past 12 months    No lab results found.            Passed - Recent (12 mo) or future (30 days) visit within the authorizing provider's specialty    Patient had office visit in the last 12 months or has a visit in the next 30 days with authorizing provider or within the authorizing provider's specialty.  See \"Patient Info\" tab in inbasket, or \"Choose Columns\" in Meds & Orders section of the refill encounter.             Passed - Patient is age 18 or older          "

## 2018-10-22 NOTE — TELEPHONE ENCOUNTER
Message from [facility]:  Original authorizing provider: MD Wilmer Jay would like a refill of the following medications:  atorvastatin (LIPITOR) 80 MG tablet [Janis Marcial MD]    Preferred pharmacy: [facility] - [facility] [address]    Comment:

## 2018-10-24 ENCOUNTER — TRANSFERRED RECORDS (OUTPATIENT)
Dept: HEALTH INFORMATION MANAGEMENT | Facility: CLINIC | Age: 53
End: 2018-10-24

## 2018-11-03 ENCOUNTER — MYC MEDICAL ADVICE (OUTPATIENT)
Dept: FAMILY MEDICINE | Facility: CLINIC | Age: 53
End: 2018-11-03

## 2018-11-03 DIAGNOSIS — Z12.5 SCREENING PSA (PROSTATE SPECIFIC ANTIGEN): Primary | ICD-10-CM

## 2018-11-05 NOTE — TELEPHONE ENCOUNTER
PCP:    Please see below. Last PSA was in 2011. The Pt had a Px in August.     Dulce Maria Rogers RN -- [facility]

## 2018-11-06 DIAGNOSIS — Z12.5 SCREENING PSA (PROSTATE SPECIFIC ANTIGEN): ICD-10-CM

## 2018-11-06 PROCEDURE — G0103 PSA SCREENING: HCPCS | Performed by: FAMILY MEDICINE

## 2018-11-07 LAB — PSA SERPL-ACNC: 0.26 UG/L (ref 0–4)

## 2018-12-11 ENCOUNTER — MYC REFILL (OUTPATIENT)
Dept: FAMILY MEDICINE | Facility: CLINIC | Age: 53
End: 2018-12-11

## 2018-12-11 DIAGNOSIS — E78.5 HYPERLIPIDEMIA LDL GOAL <100: ICD-10-CM

## 2018-12-12 RX ORDER — POLYETHYLENE GLYCOL 3350 17 G/17G
POWDER, FOR SOLUTION ORAL
Qty: 765 G | Refills: 1 | Status: SHIPPED | OUTPATIENT
Start: 2018-12-12

## 2018-12-12 NOTE — TELEPHONE ENCOUNTER
"Last Written Prescription Date:  7.27.18  Last Fill Quantity: 1054 GMS,  # refills: 3   Last office visit: 8/27/2018 with prescribing provider:  Aggie   Future Office Visit:   Next 5 appointments (look out 90 days)    Feb 25, 2019  8:00 AM CST  SHORT with Janis Marcial MD  [facility] ([facility] [address] [place] [address], [address] [address] [phone]        Requested Prescriptions   Pending Prescriptions Disp Refills     polyethylene glycol (MIRALAX/GLYCOLAX) powder 1054 g 3     Sig: MIX 1/2 CAPFUL IN 4 OUNCES OF LIQUID AND DRINK DAILY AS NEEDED    Laxatives Protocol Passed - 12/11/2018  6:27 PM       Passed - Patient is age 6 or older       Passed - Recent (12 mo) or future (30 days) visit within the authorizing provider's specialty    Patient had office visit in the last 12 months or has a visit in the next 30 days with authorizing provider or within the authorizing provider's specialty.  See \"Patient Info\" tab in inbasket, or \"Choose Columns\" in Meds & Orders section of the refill encounter.              According to pharmacy a 1/2 capful is 8.5 GMS  So 90 day supply needs 765 GMS  Received 1054 GMS which should be about 124 doses    Prescription approved per [facility] Refill Protocol  Briseida Palacio RN BS    "

## 2018-12-19 DIAGNOSIS — I10 HTN, GOAL BELOW 140/90: ICD-10-CM

## 2018-12-19 NOTE — TELEPHONE ENCOUNTER
"Requested Prescriptions   Pending Prescriptions Disp Refills     hydrochlorothiazide (MICROZIDE) 12.5 MG capsule [Pharmacy Med Name: HYDROCHLOROTHIAZIDE 12.5 MG Capsule] 90 capsule 1    Last Written Prescription Date:  7/24/18  Last Fill Quantity: 90,  # refills: 1   Last Office Visit: 8/27/2018 Aggie      Return in about 6 months (around 2/27/2019) for diabetes check, weight check, fasting labs.     Future Office Visit:    Next 5 appointments (look out 90 days)    Feb 25, 2019  8:00 AM CST  SHORT with Janis Marcial MD  [facility] [address] [place] [address], [facility] [address]  [facility] [address] [phone]          Sig: TAKE 1 CAPSULE (12.5 MG) BY MOUTH DAILY    Diuretics (Including Combos) Protocol Passed - 12/19/2018 12:04 AM       Passed - Blood pressure under 140/90 in past 12 months    BP Readings from Last 3 Encounters:   09/12/18 132/71   08/27/18 132/72   01/29/18 138/80                Passed - Recent (12 mo) or future (30 days) visit within the authorizing provider's specialty    Patient had office visit in the last 12 months or has a visit in the next 30 days with authorizing provider or within the authorizing provider's specialty.  See \"Patient Info\" tab in inbasket, or \"Choose Columns\" in Meds & Orders section of the refill encounter.             Passed - Patient is age 18 or older       Passed - Normal serum creatinine on file in past 12 months    Recent Labs   Lab Test 08/27/18  1057   CR 1.10             Passed - Normal serum potassium on file in past 12 months    Recent Labs   Lab Test 08/27/18  1057   POTASSIUM 4.1                   Passed - Normal serum sodium on file in past 12 months    Recent Labs   Lab Test 08/27/18  1057                 "

## 2018-12-20 RX ORDER — HYDROCHLOROTHIAZIDE 12.5 MG/1
12.5 CAPSULE ORAL DAILY
Qty: 90 CAPSULE | Refills: 1 | Status: SHIPPED | OUTPATIENT
Start: 2018-12-20 | End: 2019-06-08

## 2019-02-01 ENCOUNTER — TRANSFERRED RECORDS (OUTPATIENT)
Dept: HEALTH INFORMATION MANAGEMENT | Facility: CLINIC | Age: 54
End: 2019-02-01

## 2019-03-08 DIAGNOSIS — F33.42 MAJOR DEPRESSIVE DISORDER, RECURRENT EPISODE, IN FULL REMISSION (H): ICD-10-CM

## 2019-03-09 RX ORDER — PAROXETINE 20 MG/1
20 TABLET, FILM COATED ORAL AT BEDTIME
Qty: 90 TABLET | Refills: 0 | Status: SHIPPED | OUTPATIENT
Start: 2019-03-09 | End: 2019-06-08

## 2019-03-09 NOTE — TELEPHONE ENCOUNTER
"Paroxetine 20mg    Last Written Prescription Date:  7/24/2018  Last Fill Quantity: 90,  # refills: 1   Last office visit: 8/27/2018 with prescribing provider:     Future Office Visit:   Next 5 appointments (look out 90 days)    Mar 13, 2019 10:40 AM CDT  SHORT with Janis Marcial MD  [facility] ([facility]) [address] [place] [address], [address] [address] [phone]         Requested Prescriptions   Pending Prescriptions Disp Refills     PARoxetine (PAXIL) 20 MG tablet [Pharmacy Med Name: PAROXETINE HCL 20 MG Tablet] 90 tablet 1     Sig: TAKE 1 TABLET (20 MG) BY MOUTH AT BEDTIME    SSRIs Protocol Passed - 3/8/2019  4:36 PM       Passed - PHQ-9 score less than 5 in past 6 months    Please review last PHQ-9 score.          Passed - Medication is active on med list       Passed - Patient is age 18 or older       Passed - Recent (6 mo) or future (30 days) visit within the authorizing provider's specialty    Patient had office visit in the last 6 months or has a visit in the next 30 days with authorizing provider or within the authorizing provider's specialty.  See \"Patient Info\" tab in inbasket, or \"Choose Columns\" in Meds & Orders section of the refill encounter.            PHQ-9 and GAD7 Scores  2/15/2016   5/25/2016   10/31/2016   8/17/2017   3/6/2018   3/17/2018   9/27/2018    PHQ9 TOTAL SCORE - - - - - 0 0   PHQ-9 Total Score 0 0 0 0 0 0 0   MCKENNA-7 Total Score - 0 0 0 0 0 1     Patient has an upcoming appointment.  Will give refill to get the patient through.  Yissel Mcdonald RN    "

## 2019-03-13 ENCOUNTER — OFFICE VISIT (OUTPATIENT)
Dept: FAMILY MEDICINE | Facility: CLINIC | Age: 54
End: 2019-03-13
Payer: MEDICARE

## 2019-03-13 ENCOUNTER — MYC MEDICAL ADVICE (OUTPATIENT)
Dept: FAMILY MEDICINE | Facility: CLINIC | Age: 54
End: 2019-03-13

## 2019-03-13 VITALS
HEART RATE: 60 BPM | HEIGHT: 76 IN | DIASTOLIC BLOOD PRESSURE: 80 MMHG | RESPIRATION RATE: 16 BRPM | SYSTOLIC BLOOD PRESSURE: 138 MMHG | TEMPERATURE: 98.4 F | OXYGEN SATURATION: 98 % | WEIGHT: 233.3 LBS | BODY MASS INDEX: 28.41 KG/M2

## 2019-03-13 DIAGNOSIS — E78.5 HYPERLIPIDEMIA LDL GOAL <100: ICD-10-CM

## 2019-03-13 DIAGNOSIS — Z71.89 COUNSELING REGARDING ADVANCED DIRECTIVES: ICD-10-CM

## 2019-03-13 DIAGNOSIS — I24.0 ISCHEMIC HEART DISEASE DUE TO CORONARY ARTERY OBSTRUCTION (H): ICD-10-CM

## 2019-03-13 DIAGNOSIS — E55.9 VITAMIN D DEFICIENCY: ICD-10-CM

## 2019-03-13 DIAGNOSIS — I25.9 ISCHEMIC HEART DISEASE DUE TO CORONARY ARTERY OBSTRUCTION (H): ICD-10-CM

## 2019-03-13 DIAGNOSIS — E11.8 CONTROLLED TYPE 2 DIABETES MELLITUS WITH COMPLICATION, WITHOUT LONG-TERM CURRENT USE OF INSULIN (H): Primary | ICD-10-CM

## 2019-03-13 DIAGNOSIS — I10 HTN, GOAL BELOW 140/90: ICD-10-CM

## 2019-03-13 LAB — HBA1C MFR BLD: 7.2 % (ref 0–5.6)

## 2019-03-13 PROCEDURE — 36415 COLL VENOUS BLD VENIPUNCTURE: CPT | Performed by: FAMILY MEDICINE

## 2019-03-13 PROCEDURE — 82043 UR ALBUMIN QUANTITATIVE: CPT | Performed by: FAMILY MEDICINE

## 2019-03-13 PROCEDURE — 80061 LIPID PANEL: CPT | Performed by: FAMILY MEDICINE

## 2019-03-13 PROCEDURE — 99214 OFFICE O/P EST MOD 30 MIN: CPT | Performed by: FAMILY MEDICINE

## 2019-03-13 PROCEDURE — 80053 COMPREHEN METABOLIC PANEL: CPT | Performed by: FAMILY MEDICINE

## 2019-03-13 PROCEDURE — 83036 HEMOGLOBIN GLYCOSYLATED A1C: CPT | Performed by: FAMILY MEDICINE

## 2019-03-13 RX ORDER — NITROGLYCERIN 0.4 MG/1
TABLET SUBLINGUAL
Qty: 25 TABLET | Refills: 0 | Status: SHIPPED | OUTPATIENT
Start: 2019-03-13 | End: 2020-04-04

## 2019-03-13 ASSESSMENT — ANXIETY QUESTIONNAIRES
2. NOT BEING ABLE TO STOP OR CONTROL WORRYING: NOT AT ALL
1. FEELING NERVOUS, ANXIOUS, OR ON EDGE: NOT AT ALL
GAD7 TOTAL SCORE: 0
5. BEING SO RESTLESS THAT IT IS HARD TO SIT STILL: NOT AT ALL
7. FEELING AFRAID AS IF SOMETHING AWFUL MIGHT HAPPEN: NOT AT ALL
3. WORRYING TOO MUCH ABOUT DIFFERENT THINGS: NOT AT ALL
6. BECOMING EASILY ANNOYED OR IRRITABLE: NOT AT ALL

## 2019-03-13 ASSESSMENT — MIFFLIN-ST. JEOR: SCORE: 1994.74

## 2019-03-13 ASSESSMENT — PATIENT HEALTH QUESTIONNAIRE - PHQ9
SUM OF ALL RESPONSES TO PHQ QUESTIONS 1-9: 0
5. POOR APPETITE OR OVEREATING: NOT AT ALL

## 2019-03-13 NOTE — PROGRESS NOTES
"  SUBJECTIVE:   Edd Fong is a 53 year old male who presents to clinic today for the following health issues:  {Provider please address medication reconciliation discrepancies--rooming staff please delete if no med/rec issues}    History of Present Illness     Diet:  Low salt  Frequency of exercise:  2-3 days/week  Duration of exercise:  30-45 minutes  Taking medications regularly:  Yes  Additional concerns today:  No    {additional problems for roomer to add, delete if none:390265}  Problem list and histories reviewed & adjusted, as indicated.  Additional history: {NONE - AS DOCUMENTED:803078::\"as documented\"}    {ACUTE Problem SUPERLIST - brief histories:842546}    {HIST REVIEW/ LINKS 2:404395}    {PROVIDER CHARTING PREFERENCE:450886}  "

## 2019-03-13 NOTE — NURSING NOTE
"Chief Complaint   Patient presents with     Diabetes     Hypertension     Blood Draw     LABS.  Patient is fasting     Initial /80 (BP Location: Right arm, Patient Position: Sitting, Cuff Size: Adult Large)   Pulse 60   Temp 98.4  F (36.9  C) (Oral)   Resp 16   Ht 1.93 m (6' 4\")   Wt 105.8 kg (233 lb 4.8 oz)   SpO2 98%   BMI 28.40 kg/m   Estimated body mass index is 28.4 kg/m  as calculated from the following:    Height as of this encounter: 1.93 m (6' 4\").    Weight as of this encounter: 105.8 kg (233 lb 4.8 oz).  BP completed using cuff size large right arm    Lisa Magill, CMA    "

## 2019-03-13 NOTE — PATIENT INSTRUCTIONS
Goal is to be weight below 230, 225 is ideal  Limit sugary snacks  Avoiding night time snacking  Check on Shingles vaccine, Shingrix, with insurance

## 2019-03-13 NOTE — PROGRESS NOTES
SUBJECTIVE:   Edd Fong is a 53 year old male who presents to clinic today for the following health issues:    Diabetes Follow-up  Diabetes Management Resources   Patient is checking blood sugars: once daily.  Results are as follows:         am - 120-150    Diabetic concerns: None     Symptoms of hypoglycemia (low blood sugar): none     Paresthesias (numbness or burning in feet) or sores: No     Date of last diabetic eye exam: 06/15/8    - Currently on metformin 500 mg BID, still experiencing elevated sugars when checking his fasting in the morning. When he occasionally checks in the afternoon, they are usually around 120-130, and he does not ever check after his biggest meal in the evening. Pt has been trying to eat healthier, reports losing 3 lbs since LOV on 08/27/2018.         Hyperlipidemia Follow-Up    Rate your low fat/cholesterol diet?: good    Taking statin?  No    Other lipid medications/supplements?:  none    - Currently on atorvastatin & fenofibrate, last lipid panel showed:  Recent Labs   Lab Test 08/01/18  0803 08/02/17  0830  09/10/15  0814 04/20/15  0804   CHOL 191 178   < > 155 167   HDL 31* 33*   < > 33* 34*   * 105*   < > 88 103   TRIG 168* 199*   < > 168* 151*   CHOLHDLRATIO  --   --   --  4.7 4.9    < > = values in this interval not displayed.     He was also taking psyllium daily, but his insurance is no longer covering it & he wants to know if there is another option or if he needs to continue this.         Hypertension Follow-up    Outpatient blood pressures are being checked at the exercising facility.  Results are 132/70.    Low Salt Diet: no added salt    Amount of exercise or physical activity: 2-3 days/week for an average of 30-45 minutes    Problems taking medications regularly: No    Medication side effects: none    Diet: low salt and low fat/cholesterol    - Currently on hydrochlorothiazide, lisinopril & metoprolol with BP measuring 138/80 today in clinic; also has  nitroglycerin rx on hand to use as needed -- would like a refill, as his previous prescription  before he used all the tabs [rarely uses, no recent episodes].       BP Readings from Last 2 Encounters:   19 138/80   18 132/71     Hemoglobin A1C (%)   Date Value   2018 6.5 (H)   2018 6.6 (H)     LDL Cholesterol Calculated (mg/dL)   Date Value   2018 126 (H)   2017 105 (H)           Depression  Pt is doing well on Paxil, has no mood or medication concerns at today's visit.           Problem list and histories reviewed & adjusted, as indicated.  Additional history: as documented    Recent Labs   Lab Test 18  1057 18  0803 18  0812 18  1039 17  1034 17  0830 02/10/17  0839  03/15/16  0931   A1C 6.5*  --  6.6* 6.6*  --  6.3* 6.2*   < >  --    LDL  --  126*  --   --   --  105* 119*   < > 109*   HDL  --  31*  --   --   --  33* 35*   < > 33*   TRIG  --  168*  --   --   --  199* 122   < > 195*   ALT 32  --  32 28 29  --  39  --  34   CR 1.10  --  1.17 1.03 1.08  --  1.10  --  1.17   GFRESTIMATED 69  --  65 75 71  --  70  --  65   GFRESTBLACK 84  --  78 >90 86  --  85  --  79   POTASSIUM 4.1  --  4.2 4.4 4.3  --  4.1  --  4.4   TSH  --   --   --   --  2.30  --   --   --  2.99    < > = values in this interval not displayed.      BP Readings from Last 3 Encounters:   19 138/80   18 132/71   18 132/72    Wt Readings from Last 3 Encounters:   19 105.8 kg (233 lb 4.8 oz)   18 107.2 kg (236 lb 6.4 oz)   18 107.5 kg (237 lb 1.6 oz)                 Reviewed and updated as needed this visit by clinical staff  Tobacco  Allergies  Meds  Problems  Med Hx  Surg Hx  Fam Hx  Soc Hx        Reviewed and updated as needed this visit by Provider         ROS:  Constitutional, HEENT, cardiovascular, pulmonary, gi and gu systems are negative, except as otherwise noted.      This document serves as a record of the services and  "decisions personally performed and made by Janis Marcial MD. It was created on her behalf by Kayla Hurtado, a trained medical scribe. The creation of this document is based the provider's statements to the medical scribe.  Scribcat Hurtado 11:16 AM, March 13, 2019    OBJECTIVE:   /80 (BP Location: Right arm, Patient Position: Sitting, Cuff Size: Adult Large)   Pulse 60   Temp 98.4  F (36.9  C) (Oral)   Resp 16   Ht 1.93 m (6' 4\")   Wt 105.8 kg (233 lb 4.8 oz)   SpO2 98%   BMI 28.40 kg/m    Body mass index is 28.4 kg/m .  GENERAL: healthy, alert and no distress  RESP: lungs clear to auscultation - no rales, rhonchi or wheezes  CV: regular rate and rhythm, normal S1 S2, no S3 or S4, no murmur, click or rub, no peripheral edema and peripheral pulses strong  MS: no gross musculoskeletal defects noted, no edema  SKIN: no suspicious lesions or rashes  NEURO: Normal strength and tone, mentation intact and speech normal  PSYCH: mentation appears normal, affect normal/bright  Diabetic foot exam: normal DP and PT pulses, no trophic changes or ulcerative lesions and normal sensory exam    Diagnostic Test Results:  none     ASSESSMENT/PLAN:     (E11.8) Controlled type 2 diabetes mellitus with complication, without long-term current use of insulin (H)  (primary encounter diagnosis)  Comment: Labs pending; Controlled, continue medication; Advised to begin checking his sugars 2 hours after his evening meal, as well as fasting in the morning  Plan: HEMOGLOBIN A1C, Comprehensive metabolic panel,         Lipid panel reflex to direct LDL Fasting            (I24.0) Ischemic heart disease due to coronary artery obstruction (H)  Comment: Controlled, continue medication as needed  Plan: nitroGLYcerin (NITROSTAT) 0.4 MG sublingual         tablet            (E78.5) Hyperlipidemia LDL goal <100  Comment: Lab pending; Controlled, continue medication  Plan: Lipid panel reflex to direct LDL Fasting            (E55.9) Vitamin D " deficiency  Comment: Controlled, continue supplement  Plan: No changes    (I10) HTN, goal below 140/90  Comment: Lab pending; Controlled, continue medications  Plan: Albumin Random Urine Quantitative with Creat         Ratio            (Z71.89) Counseling regarding advanced directives  Comment: Advanced directive pamphlet given  Plan: HONORING CHOICES REFERRAL            Zoster immunization -- Old vaccine given previously, information given on new Shringrix vaccine & will discuss coverage with his insurance    Weight loss -- Informed his goal weight would be around 225 lbs as a goal, counseled on continuing to eat healthy & exercise regularly      FUTURE APPOINTMENTS:       - Follow-up visit in 6 months for annual physical.           The information in this document, created by the medical scribe for me, accurately reflects the services I personally performed and the decisions made by me. I have reviewed and approved this document for accuracy prior to leaving the patient care area.  11:16 AM, 03/13/19    Janis Marcial MD  [facility]

## 2019-03-14 LAB
ALBUMIN SERPL-MCNC: 4.3 G/DL (ref 3.4–5)
ALP SERPL-CCNC: 58 U/L (ref 40–150)
ALT SERPL W P-5'-P-CCNC: 31 U/L (ref 0–70)
ANION GAP SERPL CALCULATED.3IONS-SCNC: 10 MMOL/L (ref 3–14)
AST SERPL W P-5'-P-CCNC: 21 U/L (ref 0–45)
BILIRUB SERPL-MCNC: 0.4 MG/DL (ref 0.2–1.3)
BUN SERPL-MCNC: 24 MG/DL (ref 7–30)
CALCIUM SERPL-MCNC: 9.7 MG/DL (ref 8.5–10.1)
CHLORIDE SERPL-SCNC: 107 MMOL/L (ref 94–109)
CHOLEST SERPL-MCNC: 157 MG/DL
CO2 SERPL-SCNC: 24 MMOL/L (ref 20–32)
CREAT SERPL-MCNC: 1.08 MG/DL (ref 0.66–1.25)
CREAT UR-MCNC: 188 MG/DL
GFR SERPL CREATININE-BSD FRML MDRD: 77 ML/MIN/{1.73_M2}
GLUCOSE SERPL-MCNC: 138 MG/DL (ref 70–99)
HDLC SERPL-MCNC: 30 MG/DL
LDLC SERPL CALC-MCNC: 101 MG/DL
MICROALBUMIN UR-MCNC: 14 MG/L
MICROALBUMIN/CREAT UR: 7.61 MG/G CR (ref 0–17)
NONHDLC SERPL-MCNC: 127 MG/DL
POTASSIUM SERPL-SCNC: 4.3 MMOL/L (ref 3.4–5.3)
PROT SERPL-MCNC: 7.1 G/DL (ref 6.8–8.8)
SODIUM SERPL-SCNC: 141 MMOL/L (ref 133–144)
TRIGL SERPL-MCNC: 131 MG/DL

## 2019-03-14 ASSESSMENT — ANXIETY QUESTIONNAIRES: GAD7 TOTAL SCORE: 0

## 2019-03-14 NOTE — TELEPHONE ENCOUNTER
PCP:    Please see below. Doesn't look like a diabetic education referral has been placed. Please review and order if needed.     Dulce Maria Rogers, RN -- [facility]

## 2019-04-16 ENCOUNTER — MYC MEDICAL ADVICE (OUTPATIENT)
Dept: FAMILY MEDICINE | Facility: CLINIC | Age: 54
End: 2019-04-16

## 2019-04-17 NOTE — TELEPHONE ENCOUNTER
Responded to the Pt. Will reach out to diabetes Ed to see if they can contact the Pt again to schedule a visit.     Dulce Maria Rogers RN -- [facility]

## 2019-04-24 ENCOUNTER — TRANSFERRED RECORDS (OUTPATIENT)
Dept: HEALTH INFORMATION MANAGEMENT | Facility: CLINIC | Age: 54
End: 2019-04-24

## 2019-04-29 ENCOUNTER — ALLIED HEALTH/NURSE VISIT (OUTPATIENT)
Dept: EDUCATION SERVICES | Facility: CLINIC | Age: 54
End: 2019-04-29
Payer: MEDICARE

## 2019-04-29 DIAGNOSIS — E11.8 CONTROLLED TYPE 2 DIABETES MELLITUS WITH COMPLICATION, WITHOUT LONG-TERM CURRENT USE OF INSULIN (H): Primary | ICD-10-CM

## 2019-04-29 PROCEDURE — G0108 DIAB MANAGE TRN  PER INDIV: HCPCS

## 2019-04-29 NOTE — PROGRESS NOTES
"Diabetes Self-Management Education & Support    Diabetes Education Self Management & Training    SUBJECTIVE/OBJECTIVE:  Presents for: Individual review  Accompanied by: Self  Diabetes education in the past 24mo: No  Focus of Visit: Monitoring, Taking Medication, Healthy Eating  Diabetes type: Type 2  Date of diagnosis: 9 years ago  Disease course: Worsening  How confident are you filling out medical forms by yourself:: Extremely  Diabetes management related comments/concerns: How do I lower my A1C?  Transportation concerns: No  Other concerns:: None  Cultural Influences/Ethnic Background:  American      Diabetes Symptoms & Complications  Blurred vision: No  Fatigue: No  Neuropathy: No  Foot ulcerations: No  Polydipsia: No  Polyphagia: No  Polyuria: No  Visual change: No  Weakness: No  Weight loss: No  Slow healing wounds: No  Recent Infection(s): No  Weight trend: Decreasing steadily  Autonomic neuropathy: No  CVA: No  Heart disease: No  Nephropathy: No  Peripheral neuropathy: No  Peripheral Vascular Disease: No  Retinopathy: No  Sexual dysfunction: No    Patient Problem List and Family Medical History reviewed for relevant medical history, current medical status, and diabetes risk factors.    Vitals:  There were no vitals taken for this visit.  Estimated body mass index is 28.4 kg/m  as calculated from the following:    Height as of 3/13/19: 1.93 m (6' 4\").    Weight as of 3/13/19: 105.8 kg (233 lb 4.8 oz).   Last 3 BP:   BP Readings from Last 3 Encounters:   03/13/19 138/80   09/12/18 132/71   08/27/18 132/72       History   Smoking Status     Never Smoker   Smokeless Tobacco     Never Used       Labs:  Lab Results   Component Value Date    A1C 7.2 03/13/2019     Lab Results   Component Value Date     03/13/2019     Lab Results   Component Value Date     03/13/2019     HDL Cholesterol   Date Value Ref Range Status   03/13/2019 30 (L) >39 mg/dL Final   ]  GFR Estimate   Date Value Ref Range Status "   2019 77 >60 mL/min/[1.73_m2] Final     Comment:     Non  GFR Calc  Starting 2018, serum creatinine based estimated GFR (eGFR) will be   calculated using the Chronic Kidney Disease Epidemiology Collaboration   (CKD-EPI) equation.       GFR Estimate If Black   Date Value Ref Range Status   2019 89 >60 mL/min/[1.73_m2] Final     Comment:      GFR Calc  Starting 2018, serum creatinine based estimated GFR (eGFR) will be   calculated using the Chronic Kidney Disease Epidemiology Collaboration   (CKD-EPI) equation.       Lab Results   Component Value Date    CR 1.08 2019     No results found for: MICROALBUMIN    Healthy Eating  Healthy Eating Assessed Today: Yes  Cultural/[place]ze diet restrictions?: No  Patient on a regular basis: Eats 3 meals a day  Meal planning: Avoiding sweets, Calorie counting, Carbohydrate counting, Low salt, Smaller portions  Meals include: Breakfast, Lunch, Dinner  Breakfast: cereal, milk, OJ, coffee, english muffin  Lunch: sandwich, chips, chocolate milk  Dinner: subway, miilk Or culvers- fish sandwich, fries, ice cream   Snacks: 2-3 small candy bar in the evening  Beverages: Water, Coffee, Juice, Diet soda, Alcohol  Has patient met with a dietitian in the past?: No    Being Active  Being Active Assessed Today: Yes  Exercise:: Yes  Days per week of moderate to strenuous exercise (like a brisk walk): 3  On average, minutes per day of exercise at this level: 60(at [facility]- 30 of cardio and 30 strength)  How intense was your typical exercise? : Very heavy (like fast running or stair climbing)  Exercise Minutes per Week: 180  Barrier to exercise: None    Monitoring  Monitoring Assessed Today: Yes  Did patient bring glucose meter to appointment? : Yes  Blood Glucose Meter: One Touch  Home Glucose (Sugar) Monitorin-2 times per day  Blood glucose trend: Increasing steadily  Low Glucose Range (mg/dL): 140-180  High Glucose  Range (mg/dL): 140-180  Overall Range (mg/dL): 140-180        Taking Medications  Diabetes Medication(s)     Biguanides       metFORMIN (GLUCOPHAGE) 500 MG tablet    Take 1 tablet (500 mg) by mouth 2 times daily (with meals) TAKE 1 TABLET TWICE DAILY WITH MEALS          Taking Medication Assessed Today: Yes  Current Treatments: Oral Agent (dual therapy), Oral Agent (monotherapy)  Problems taking diabetes medications regularly?: No  Diabetes medication side effects?: No  Treatment Compliance: All of the time    Problem Solving  Problem Solving Assessed Today: Yes  Hypoglycemia Frequency: Never  Patient carries a carbohydrate source: No  Medical alert: No  Severe weather/disaster plan for diabetes management?: No  DKA prevention plan?: No  Sick day plan for diabetes management?: (P) No              Reducing Risks  Reducing Risks Assessed Today: Yes  Diabetes Risks: Age over 45 years, Family History  CAD Risks: Diabetes Mellitus, Male sex(hx of cardiac arrest- and bypass)  Has dilated eye exam at least once a year?: Yes  Sees dentist every 6 months?: Yes  Sees podiatrist (foot doctor)?: Yes    Healthy Coping  Healthy Coping Assessed Today: Yes  Emotional response to diabetes: Ready to learn  Informal Support system:: Family  Stage of change: PREPARATION (Decided to change - considering how)  Difficulty affording diabetes management supplies?: No  Support resources: In-person Offerings  Patient Activation Measure Survey Score:  DEVANTE Score (Last Two) 2/8/2011 8/20/2012   DEVANTE Raw Score 36 39   Activation Score 47.4 56.4   DEVANTE Level 2 3       ASSESSMENT:  Patient is motivated to make dietary change to improve diabetes    Goals Addressed as of 4/29/2019 at 10:48 AM       Healthy Eating (pt-stated)     Added 4/29/19 by Roseann Cullen RN     My Goal: I will reduce my evening candy bar to 1 snack size    What I need to meet my goal: add veggies if still hungry    I plan to meet my goal by this date: 6/1/19             Patient's most recent   Lab Results   Component Value Date    A1C 7.2 03/13/2019    is not meeting goal of <7.0    INTERVENTION:   Diabetes knowledge and skills assessment:     Patient is knowledgeable in diabetes management concepts related to: Being Active, Monitoring, Taking Medication and Reducing Risks    Patient needs further education on the following diabetes management concepts: Healthy Eating, Being Active, Monitoring, Taking Medication, Problem Solving, Reducing Risks and Healthy Coping    Based on learning assessment above, most appropriate setting for further diabetes education would be: Group class or Individual setting.    Education provided today on:  AADE Self-Care Behaviors:  Diabetes Pathophysiology  Healthy Eating: consistency in amount, composition, and timing of food intake, eating out, portion control and plate planning method  Being Active: relationship to blood glucose  Monitoring: individual blood glucose targets and frequency of monitoring  Taking Medication: action of prescribed medication, side effects of prescribed medications and when to take medications  Problem Solving: high blood glucose - causes, signs/symptoms, treatment and prevention and low blood glucose - causes, signs/symptoms, treatment and prevention  Reducing Risks: A1C - goals, relating to blood glucose levels, how often to check  Healthy Coping: benefits of making appropriate lifestyle changes    Opportunities for ongoing education and support in diabetes-self management were discussed.    Pt verbalized understanding of concepts discussed and recommendations provided today.       Education Materials Provided:  [facility] Understanding Diabetes Booklet, BG Log Sheet, My Plate Planner and healthy meal and snack ideas    PLAN:  See Patient Instructions for co-developed, patient-stated behavior change goals.  AVS printed and provided to patient today. See Follow-Up section for recommended follow-up.    Roseann Cullen RN,CDE    Time Spent: 60 minutes  Encounter Type: Individual    Any diabetes medication dose changes were made via the CDE Protocol and Collaborative Practice Agreement with the patient's referring provider. A copy of this encounter was shared with the provider.

## 2019-04-29 NOTE — PATIENT INSTRUCTIONS
Care Plan:  Meal Plan Recommendation: eat 3 meals a day, have small snacks between meals, if needed, use portion control, use plate planning method and refer to the meal and snack ideas  Exercise / activity plan: continue with your activity  Check blood sugars once a day- test before a meal and 2 hours after the start of that meal (rotate between breakfast, lunch and supper)    Follow up:  Follow-up diabetes education appointment 1 month.      Bring blood glucose meter and logbook with you to all doctor and follow-up appointments.     [facility] for the [facility] Area:  For Your Diabetes or Nutrition Education Appointments Call:  [phone]   For Diabetes or Nutrition Related Questions Call or Email:   [phone]  [email or web address]  Fax: [phone]   If you need a medication refill please contact your pharmacy. Please allow 3 business days for your refills to be completed.     Instructions for emailing the Diabetes Educators    If you need to communicate a non-urgent message to a Diabetes Educator via email, please send to [email or web address].    Please follow the following email guidelines:    Subject line: Secure: your clinic name (example: Secure: [facility])  In the email please include: First name, middle initial, last name and date of birth.    We will be in touch with you within one (1) business day.

## 2019-04-29 NOTE — LETTER
"    4/29/2019         RE: Edd F[place]  [address] [facility] [address]        Dear Colleague,    Thank you for referring your patient, Edd Fong, to the [facility]. Please see a copy of my visit note below.    Diabetes Self-Management Education & Support    Diabetes Education Self Management & Training    SUBJECTIVE/OBJECTIVE:  Presents for: Individual review  Accompanied by: Self  Diabetes education in the past 24mo: No  Focus of Visit: Monitoring, Taking Medication, Healthy Eating  Diabetes type: Type 2  Date of diagnosis: 9 years ago  Disease course: Worsening  How confident are you filling out medical forms by yourself:: Extremely  Diabetes management related comments/concerns: How do I lower my A1C?  Transportation concerns: No  Other concerns:: None  Cultural Influences/Ethnic Background:  American      Diabetes Symptoms & Complications  Blurred vision: No  Fatigue: No  Neuropathy: No  Foot ulcerations: No  Polydipsia: No  Polyphagia: No  Polyuria: No  Visual change: No  Weakness: No  Weight loss: No  Slow healing wounds: No  Recent Infection(s): No  Weight trend: Decreasing steadily  Autonomic neuropathy: No  CVA: No  Heart disease: No  Nephropathy: No  Peripheral neuropathy: No  Peripheral Vascular Disease: No  Retinopathy: No  Sexual dysfunction: No    Patient Problem List and Family Medical History reviewed for relevant medical history, current medical status, and diabetes risk factors.    Vitals:  There were no vitals taken for this visit.  Estimated body mass index is 28.4 kg/m  as calculated from the following:    Height as of 3/13/19: 1.93 m (6' 4\").    Weight as of 3/13/19: 105.8 kg (233 lb 4.8 oz).   Last 3 BP:   BP Readings from Last 3 Encounters:   03/13/19 138/80   09/12/18 132/71   08/27/18 132/72       History   Smoking Status     Never Smoker   Smokeless Tobacco     Never Used       Labs:  Lab Results   Component Value Date    A1C 7.2 03/13/2019 "     Lab Results   Component Value Date     03/13/2019     Lab Results   Component Value Date     03/13/2019     HDL Cholesterol   Date Value Ref Range Status   03/13/2019 30 (L) >39 mg/dL Final   ]  GFR Estimate   Date Value Ref Range Status   03/13/2019 77 >60 mL/min/[1.73_m2] Final     Comment:     Non  GFR Calc  Starting 12/18/2018, serum creatinine based estimated GFR (eGFR) will be   calculated using the Chronic Kidney Disease Epidemiology Collaboration   (CKD-EPI) equation.       GFR Estimate If Black   Date Value Ref Range Status   03/13/2019 89 >60 mL/min/[1.73_m2] Final     Comment:      GFR Calc  Starting 12/18/2018, serum creatinine based estimated GFR (eGFR) will be   calculated using the Chronic Kidney Disease Epidemiology Collaboration   (CKD-EPI) equation.       Lab Results   Component Value Date    CR 1.08 03/13/2019     No results found for: MICROALBUMIN    Healthy Eating  Healthy Eating Assessed Today: Yes  Cultural/Evangelical diet restrictions?: No  Patient on a regular basis: Eats 3 meals a day  Meal planning: Avoiding sweets, Calorie counting, Carbohydrate counting, Low salt, Smaller portions  Meals include: Breakfast, Lunch, Dinner  Breakfast: cereal, milk, OJ, coffee, english muffin  Lunch: sandwich, chips, chocolate milk  Dinner: subway, miilk Or culvers- fish sandwich, fries, ice cream   Snacks: 2-3 small candy bar in the evening  Beverages: Water, Coffee, Juice, Diet soda, Alcohol  Has patient met with a dietitian in the past?: No    Being Active  Being Active Assessed Today: Yes  Exercise:: Yes  Days per week of moderate to strenuous exercise (like a brisk walk): 3  On average, minutes per day of exercise at this level: 60(at [facility]- 30 of cardio and 30 strength)  How intense was your typical exercise? : Very heavy (like fast running or stair climbing)  Exercise Minutes per Week: 180  Barrier to exercise:  None    Monitoring  Monitoring Assessed Today: Yes  Did patient bring glucose meter to appointment? : Yes  Blood Glucose Meter: One Touch  Home Glucose (Sugar) Monitorin-2 times per day  Blood glucose trend: Increasing steadily  Low Glucose Range (mg/dL): 140-180  High Glucose Range (mg/dL): 140-180  Overall Range (mg/dL): 140-180        Taking Medications  Diabetes Medication(s)     Biguanides       metFORMIN (GLUCOPHAGE) 500 MG tablet    Take 1 tablet (500 mg) by mouth 2 times daily (with meals) TAKE 1 TABLET TWICE DAILY WITH MEALS          Taking Medication Assessed Today: Yes  Current Treatments: Oral Agent (dual therapy), Oral Agent (monotherapy)  Problems taking diabetes medications regularly?: No  Diabetes medication side effects?: No  Treatment Compliance: All of the time    Problem Solving  Problem Solving Assessed Today: Yes  Hypoglycemia Frequency: Never  Patient carries a carbohydrate source: No  Medical alert: No  Severe weather/disaster plan for diabetes management?: No  DKA prevention plan?: No  Sick day plan for diabetes management?: (P) No              Reducing Risks  Reducing Risks Assessed Today: Yes  Diabetes Risks: Age over 45 years, Family History  CAD Risks: Diabetes Mellitus, Male sex(hx of cardiac arrest- and bypass)  Has dilated eye exam at least once a year?: Yes  Sees dentist every 6 months?: Yes  Sees podiatrist (foot doctor)?: Yes    Healthy Coping  Healthy Coping Assessed Today: Yes  Emotional response to diabetes: Ready to learn  Informal Support system:: Family  Stage of change: PREPARATION (Decided to change - considering how)  Difficulty affording diabetes management supplies?: No  Support resources: In-person Offerings  Patient Activation Measure Survey Score:  DEVANTE Score (Last Two) 2011   DEVANTE Raw Score 36 39   Activation Score 47.4 56.4   DEVANTE Level 2 3       ASSESSMENT:  Patient is motivated to make dietary change to improve diabetes    Goals Addressed as of  4/29/2019 at 10:48 AM       Healthy Eating (pt-stated)     Added 4/29/19 by Roseann Cullen RN     My Goal: I will reduce my evening candy bar to 1 snack size    What I need to meet my goal: add veggies if still hungry    I plan to meet my goal by this date: 6/1/19            Patient's most recent   Lab Results   Component Value Date    A1C 7.2 03/13/2019    is not meeting goal of <7.0    INTERVENTION:   Diabetes knowledge and skills assessment:     Patient is knowledgeable in diabetes management concepts related to: Being Active, Monitoring, Taking Medication and Reducing Risks    Patient needs further education on the following diabetes management concepts: Healthy Eating, Being Active, Monitoring, Taking Medication, Problem Solving, Reducing Risks and Healthy Coping    Based on learning assessment above, most appropriate setting for further diabetes education would be: Group class or Individual setting.    Education provided today on:  AADE Self-Care Behaviors:  Diabetes Pathophysiology  Healthy Eating: consistency in amount, composition, and timing of food intake, eating out, portion control and plate planning method  Being Active: relationship to blood glucose  Monitoring: individual blood glucose targets and frequency of monitoring  Taking Medication: action of prescribed medication, side effects of prescribed medications and when to take medications  Problem Solving: high blood glucose - causes, signs/symptoms, treatment and prevention and low blood glucose - causes, signs/symptoms, treatment and prevention  Reducing Risks: A1C - goals, relating to blood glucose levels, how often to check  Healthy Coping: benefits of making appropriate lifestyle changes    Opportunities for ongoing education and support in diabetes-self management were discussed.    Pt verbalized understanding of concepts discussed and recommendations provided today.       Education Materials Provided:  [facility] Understanding Diabetes  Booklet, BG Log Sheet, My Plate Planner and healthy meal and snack ideas    PLAN:  See Patient Instructions for co-developed, patient-stated behavior change goals.  AVS printed and provided to patient today. See Follow-Up section for recommended follow-up.    Roseann Cullen RN,CDE   Time Spent: 60 minutes  Encounter Type: Individual    Any diabetes medication dose changes were made via the CDE Protocol and Collaborative Practice Agreement with the patient's referring provider. A copy of this encounter was shared with the provider.

## 2019-06-03 ENCOUNTER — ALLIED HEALTH/NURSE VISIT (OUTPATIENT)
Dept: EDUCATION SERVICES | Facility: CLINIC | Age: 54
End: 2019-06-03
Payer: MEDICARE

## 2019-06-03 VITALS — BODY MASS INDEX: 27.57 KG/M2 | WEIGHT: 226.5 LBS

## 2019-06-03 DIAGNOSIS — E11.8 CONTROLLED TYPE 2 DIABETES MELLITUS WITH COMPLICATION, WITHOUT LONG-TERM CURRENT USE OF INSULIN (H): Primary | ICD-10-CM

## 2019-06-03 DIAGNOSIS — I10 HTN, GOAL BELOW 140/90: ICD-10-CM

## 2019-06-03 PROCEDURE — G0108 DIAB MANAGE TRN  PER INDIV: HCPCS

## 2019-06-03 NOTE — LETTER
"    6/3/2019         RE: Edd Fong  [address] [facility] [address]        Dear Colleague,    Thank you for referring your patient, Edd Fong, to the [facility]. Please see a copy of my visit note below.    Diabetes Self-Management Education & Support    Diabetes Education Self Management & Training    SUBJECTIVE/OBJECTIVE:  Presents for: Individual review  Accompanied by: Self  Diabetes education in the past 24mo: Yes  Focus of Visit: Monitoring, Taking Medication, Healthy Eating  Diabetes type: Type 2  Date of diagnosis: 9 years ago  Disease course: Worsening  How confident are you filling out medical forms by yourself:: Extremely  Transportation concerns: No  Other concerns:: None  Cultural Influences/Ethnic Background:  American      Diabetes Symptoms & Complications  Blurred vision: No  Fatigue: No  Neuropathy: No  Foot ulcerations: No  Polydipsia: No  Polyphagia: No  Polyuria: No  Visual change: No  Weakness: No  Weight loss: No  Slow healing wounds: No  Recent Infection(s): No  Weight trend: Decreasing steadily  Autonomic neuropathy: No  CVA: No  Heart disease: No  Nephropathy: No  Peripheral neuropathy: No  Peripheral Vascular Disease: No  Retinopathy: No  Sexual dysfunction: No    Patient Problem List and Family Medical History reviewed for relevant medical history, current medical status, and diabetes risk factors.    Vitals:  Wt 102.7 kg (226 lb 8 oz)   BMI 27.57 kg/m     Estimated body mass index is 27.57 kg/m  as calculated from the following:    Height as of 3/13/19: 1.93 m (6' 4\").    Weight as of this encounter: 102.7 kg (226 lb 8 oz).   Last 3 BP:   BP Readings from Last 3 Encounters:   03/13/19 138/80   09/12/18 132/71   08/27/18 132/72       History   Smoking Status     Never Smoker   Smokeless Tobacco     Never Used       Labs:  Lab Results   Component Value Date    A1C 7.2 03/13/2019     Lab Results   Component Value Date     03/13/2019 "     Lab Results   Component Value Date     2019     HDL Cholesterol   Date Value Ref Range Status   2019 30 (L) >39 mg/dL Final   ]  GFR Estimate   Date Value Ref Range Status   2019 77 >60 mL/min/[1.73_m2] Final     Comment:     Non  GFR Calc  Starting 2018, serum creatinine based estimated GFR (eGFR) will be   calculated using the Chronic Kidney Disease Epidemiology Collaboration   (CKD-EPI) equation.       GFR Estimate If Black   Date Value Ref Range Status   2019 89 >60 mL/min/[1.73_m2] Final     Comment:      GFR Calc  Starting 2018, serum creatinine based estimated GFR (eGFR) will be   calculated using the Chronic Kidney Disease Epidemiology Collaboration   (CKD-EPI) equation.       Lab Results   Component Value Date    CR 1.08 2019     No results found for: MICROALBUMIN    Healthy Eating  Healthy Eating Assessed Today: Yes  Cultural/Caodaism diet restrictions?: No  Meal planning: Avoiding sweets, Calorie counting, Carbohydrate counting, Low salt, Smaller portions(no chocolate milk, less cereal, less candy bars in the evening, less fries, eating out less)  Meals include: Breakfast, Lunch, Dinner  Breakfast: egg and 1/2 english muffin  Lunch: sandwich, chips, milk  Dinner: subway, miilk Or culvers- fish sandwich, fries, ice cream   Beverages: Water, Coffee, Juice, Diet soda, Alcohol  Has patient met with a dietitian in the past?: No    Being Active  Being Active Assessed Today: Yes  Exercise:: Yes  Days per week of moderate to strenuous exercise (like a brisk walk): 3  On average, minutes per day of exercise at this level: 30  How intense was your typical exercise? : Moderate (like brisk walking)  Exercise Minutes per Week: 90  Barrier to exercise: None    Monitoring  Monitoring Assessed Today: Yes  Did patient bring glucose meter to appointment? : Yes  Blood Glucose Meter: One Touch  Home Glucose (Sugar) Monitorin times per  day  Blood glucose trend: Decreasing steadily  Low Glucose Range (mg/dL): 70-90  High Glucose Range (mg/dL): 180-200  Overall Range (mg/dL): 130-140    Unable to download meter  7 day average : 139mg/dl  14 day average: 140mg/dl  30 day average: 155mg/dl    Taking Medications  Diabetes Medication(s)     Biguanides       metFORMIN (GLUCOPHAGE) 500 MG tablet    Take 1 tablet (500 mg) by mouth 2 times daily (with meals) TAKE 1 TABLET TWICE DAILY WITH MEALS          Taking Medication Assessed Today: Yes  Current Treatments: Oral Agent (dual therapy), Oral Agent (monotherapy)  Problems taking diabetes medications regularly?: No  Diabetes medication side effects?: No  Treatment Compliance: All of the time    Problem Solving  Problem Solving Assessed Today: Yes  Hypoglycemia Frequency: Never  Patient carries a carbohydrate source: No  Medical alert: No  Severe weather/disaster plan for diabetes management?: No  DKA prevention plan?: No              Reducing Risks  Reducing Risks Assessed Today: Yes  Diabetes Risks: Age over 45 years, Family History  CAD Risks: Diabetes Mellitus, Male sex(hx of cardiac arrest- and bypass)  Has dilated eye exam at least once a year?: Yes  Sees dentist every 6 months?: Yes  Sees podiatrist (foot doctor)?: Yes    Healthy Coping  Healthy Coping Assessed Today: Yes  Emotional response to diabetes: Ready to learn  Informal Support system:: Family  Stage of change: ACTION (Actively working towards change)  Difficulty affording diabetes management supplies?: No  Support resources: In-person Offerings  Patient Activation Measure Survey Score:  DEVANTE Score (Last Two) 2/8/2011 8/20/2012   DEVANTE Raw Score 36 39   Activation Score 47.4 56.4   DEVANTE Level 2 3       ASSESSMENT:  Patient working on lifestyle changes to improve diabetes. His weight is down, he is reducing portions, limiting sweets and eating out less.     Patient's most recent   Lab Results   Component Value Date    A1C 7.2 03/13/2019    is not  meeting goal of <7.0    INTERVENTION:   Diabetes knowledge and skills assessment:     Patient is knowledgeable in diabetes management concepts related to: Being Active, Monitoring and Taking Medication    Patient needs further education on the following diabetes management concepts: Healthy Eating, Reducing Risks and Healthy Coping    Based on learning assessment above, most appropriate setting for further diabetes education would be: Group class or Individual setting.    Education provided today on:  AADE Self-Care Behaviors:  Healthy Eating: carbohydrate counting, consistency in amount, composition, and timing of food intake, weight reduction, heart healthy diet, eating out, portion control and plate planning method  Monitoring: log and interpret results, individual blood glucose targets and frequency of monitoring  Reducing Risks: major complications of diabetes, prevention, early diagnostic measures and treatment of complications, appropriate dental care and annual eye exam  Healthy Coping: recognize feelings about diagnosis and benefits of making appropriate lifestyle changes    Opportunities for ongoing education and support in diabetes-self management were discussed.    Pt verbalized understanding of concepts discussed and recommendations provided today.       Education Materials Provided:  Healthy meal and snack ideas  One touch verio meter    PLAN:  See Patient Instructions for co-developed, patient-stated behavior change goals.  AVS printed and provided to patient today. See Follow-Up section for recommended follow-up.    Roseann Cullen RN,CDE   Time Spent: 60 minutes  Encounter Type: Individual    Any diabetes medication dose changes were made via the CDE Protocol and Collaborative Practice Agreement with the patient's referring provider. A copy of this encounter was shared with the provider.

## 2019-06-03 NOTE — PATIENT INSTRUCTIONS
Care Plan:  Meal Plan Recommendation: eat 3 meals a day, have small snacks between meals, if needed, use portion control, use plate planning method and refer to the meal and snack ideas  Exercise / activity plan: continue with your activity- try to increase to 30 minutes 5 times a week 9 or 150 minutes per week)   Check blood sugars once a week- test before a meal and 2 hours after the start of that meal (rotates between breakfast, lunch and supper)    Follow up:  Follow-up for annual diabetes education review in 3-6 months or sooner, if needed.     Bring blood glucose meter and logbook with you to all doctor and follow-up appointments.     [facility] for the [facility]:  For Your Diabetes or Nutrition Education Appointments Call:  [phone]   For Diabetes or Nutrition Related Questions Call or Email:   [phone]  [email or web address]  Fax: [phone]   If you need a medication refill please contact your pharmacy. Please allow 3 business days for your refills to be completed.     Instructions for emailing the Diabetes Educators    If you need to communicate a non-urgent message to a Diabetes Educator via email, please send to [email or web address].    Please follow the following email guidelines:    Subject line: Secure: your clinic name (example: Secure: [facility])  In the email please include: First name, middle initial, last name and date of birth.    We will be in touch with you within one (1) business day.

## 2019-06-03 NOTE — PROGRESS NOTES
"Diabetes Self-Management Education & Support    Diabetes Education Self Management & Training    SUBJECTIVE/OBJECTIVE:  Presents for: Individual review  Accompanied by: Self  Diabetes education in the past 24mo: Yes  Focus of Visit: Monitoring, Taking Medication, Healthy Eating  Diabetes type: Type 2  Date of diagnosis: 9 years ago  Disease course: Worsening  How confident are you filling out medical forms by yourself:: Extremely  Transportation concerns: No  Other concerns:: None  Cultural Influences/Ethnic Background:  American      Diabetes Symptoms & Complications  Blurred vision: No  Fatigue: No  Neuropathy: No  Foot ulcerations: No  Polydipsia: No  Polyphagia: No  Polyuria: No  Visual change: No  Weakness: No  Weight loss: No  Slow healing wounds: No  Recent Infection(s): No  Weight trend: Decreasing steadily  Autonomic neuropathy: No  CVA: No  Heart disease: No  Nephropathy: No  Peripheral neuropathy: No  Peripheral Vascular Disease: No  Retinopathy: No  Sexual dysfunction: No    Patient Problem List and Family Medical History reviewed for relevant medical history, current medical status, and diabetes risk factors.    Vitals:  Wt 102.7 kg (226 lb 8 oz)   BMI 27.57 kg/m    Estimated body mass index is 27.57 kg/m  as calculated from the following:    Height as of 3/13/19: 1.93 m (6' 4\").    Weight as of this encounter: 102.7 kg (226 lb 8 oz).   Last 3 BP:   BP Readings from Last 3 Encounters:   03/13/19 138/80   09/12/18 132/71   08/27/18 132/72       History   Smoking Status     Never Smoker   Smokeless Tobacco     Never Used       Labs:  Lab Results   Component Value Date    A1C 7.2 03/13/2019     Lab Results   Component Value Date     03/13/2019     Lab Results   Component Value Date     03/13/2019     HDL Cholesterol   Date Value Ref Range Status   03/13/2019 30 (L) >39 mg/dL Final   ]  GFR Estimate   Date Value Ref Range Status   03/13/2019 77 >60 mL/min/[1.73_m2] Final     Comment:     Non "  GFR Calc  Starting 2018, serum creatinine based estimated GFR (eGFR) will be   calculated using the Chronic Kidney Disease Epidemiology Collaboration   (CKD-EPI) equation.       GFR Estimate If Black   Date Value Ref Range Status   2019 89 >60 mL/min/[1.73_m2] Final     Comment:      GFR Calc  Starting 2018, serum creatinine based estimated GFR (eGFR) will be   calculated using the Chronic Kidney Disease Epidemiology Collaboration   (CKD-EPI) equation.       Lab Results   Component Value Date    CR 1.08 2019     No results found for: MICROALBUMIN    Healthy Eating  Healthy Eating Assessed Today: Yes  Cultural/Sikhism diet restrictions?: No  Meal planning: Avoiding sweets, Calorie counting, Carbohydrate counting, Low salt, Smaller portions(no chocolate milk, less cereal, less candy bars in the evening, less fries, eating out less)  Meals include: Breakfast, Lunch, Dinner  Breakfast: egg and 1/2 english muffin  Lunch: sandwich, chips, milk  Dinner: subway, miilk Or culvers- fish sandwich, fries, ice cream   Beverages: Water, Coffee, Juice, Diet soda, Alcohol  Has patient met with a dietitian in the past?: No    Being Active  Being Active Assessed Today: Yes  Exercise:: Yes  Days per week of moderate to strenuous exercise (like a brisk walk): 3  On average, minutes per day of exercise at this level: 30  How intense was your typical exercise? : Moderate (like brisk walking)  Exercise Minutes per Week: 90  Barrier to exercise: None    Monitoring  Monitoring Assessed Today: Yes  Did patient bring glucose meter to appointment? : Yes  Blood Glucose Meter: One Touch  Home Glucose (Sugar) Monitorin times per day  Blood glucose trend: Decreasing steadily  Low Glucose Range (mg/dL): 70-90  High Glucose Range (mg/dL): 180-200  Overall Range (mg/dL): 130-140    Unable to download meter  7 day average : 139mg/dl  14 day average: 140mg/dl  30 day average:  155mg/dl    Taking Medications  Diabetes Medication(s)     Biguanides       metFORMIN (GLUCOPHAGE) 500 MG tablet    Take 1 tablet (500 mg) by mouth 2 times daily (with meals) TAKE 1 TABLET TWICE DAILY WITH MEALS          Taking Medication Assessed Today: Yes  Current Treatments: Oral Agent (dual therapy), Oral Agent (monotherapy)  Problems taking diabetes medications regularly?: No  Diabetes medication side effects?: No  Treatment Compliance: All of the time    Problem Solving  Problem Solving Assessed Today: Yes  Hypoglycemia Frequency: Never  Patient carries a carbohydrate source: No  Medical alert: No  Severe weather/disaster plan for diabetes management?: No  DKA prevention plan?: No              Reducing Risks  Reducing Risks Assessed Today: Yes  Diabetes Risks: Age over 45 years, Family History  CAD Risks: Diabetes Mellitus, Male sex(hx of cardiac arrest- and bypass)  Has dilated eye exam at least once a year?: Yes  Sees dentist every 6 months?: Yes  Sees podiatrist (foot doctor)?: Yes    Healthy Coping  Healthy Coping Assessed Today: Yes  Emotional response to diabetes: Ready to learn  Informal Support system:: Family  Stage of change: ACTION (Actively working towards change)  Difficulty affording diabetes management supplies?: No  Support resources: In-person Offerings  Patient Activation Measure Survey Score:  DEVANTE Score (Last Two) 2/8/2011 8/20/2012   DEVANTE Raw Score 36 39   Activation Score 47.4 56.4   DEVANTE Level 2 3       ASSESSMENT:  Patient working on lifestyle changes to improve diabetes. His weight is down, he is reducing portions, limiting sweets and eating out less.     Patient's most recent   Lab Results   Component Value Date    A1C 7.2 03/13/2019    is not meeting goal of <7.0    INTERVENTION:   Diabetes knowledge and skills assessment:     Patient is knowledgeable in diabetes management concepts related to: Being Active, Monitoring and Taking Medication    Patient needs further education on the  following diabetes management concepts: Healthy Eating, Reducing Risks and Healthy Coping    Based on learning assessment above, most appropriate setting for further diabetes education would be: Group class or Individual setting.    Education provided today on:  AADE Self-Care Behaviors:  Healthy Eating: carbohydrate counting, consistency in amount, composition, and timing of food intake, weight reduction, heart healthy diet, eating out, portion control and plate planning method  Monitoring: log and interpret results, individual blood glucose targets and frequency of monitoring  Reducing Risks: major complications of diabetes, prevention, early diagnostic measures and treatment of complications, appropriate dental care and annual eye exam  Healthy Coping: recognize feelings about diagnosis and benefits of making appropriate lifestyle changes    Opportunities for ongoing education and support in diabetes-self management were discussed.    Pt verbalized understanding of concepts discussed and recommendations provided today.       Education Materials Provided:  Healthy meal and snack ideas  One touch verio meter    PLAN:  See Patient Instructions for co-developed, patient-stated behavior change goals.  AVS printed and provided to patient today. See Follow-Up section for recommended follow-up.    Roseann Cullen RN,CDE   Time Spent: 60 minutes  Encounter Type: Individual    Any diabetes medication dose changes were made via the CDE Protocol and Collaborative Practice Agreement with the patient's referring provider. A copy of this encounter was shared with the provider.

## 2019-06-04 NOTE — TELEPHONE ENCOUNTER
"Requested Prescriptions   Pending Prescriptions Disp Refills     lisinopril (PRINIVIL/ZESTRIL) 40 MG tablet [Pharmacy Med Name: LISINOPRIL 40 MG Tablet] 90 tablet 1     Sig: TAKE 1 TABLET EVERY DAY   Last Written Prescription Date:  12/11/18  Last Fill Quantity: 90,  # refills: 1   Last Office Visit: 3/13/2019 Aggie       Return in about 6 months (around 9/13/2019) for wellness exam.   Future Office Visit:         ACE Inhibitors (Including Combos) Protocol Passed - 6/3/2019  7:31 PM        Passed - Blood pressure under 140/90 in past 12 months     BP Readings from Last 3 Encounters:   03/13/19 138/80   09/12/18 132/71   08/27/18 132/72                 Passed - Recent (12 mo) or future (30 days) visit within the authorizing provider's specialty     Patient had office visit in the last 12 months or has a visit in the next 30 days with authorizing provider or within the authorizing provider's specialty.  See \"Patient Info\" tab in inbasket, or \"Choose Columns\" in Meds & Orders section of the refill encounter.              Passed - Medication is active on med list        Passed - Patient is age 18 or older        Passed - Normal serum creatinine on file in past 12 months     Recent Labs   Lab Test 03/13/19  1145   CR 1.08             Passed - Normal serum potassium on file in past 12 months     Recent Labs   Lab Test 03/13/19  1145   POTASSIUM 4.3             "

## 2019-06-05 RX ORDER — LISINOPRIL 40 MG/1
TABLET ORAL
Qty: 90 TABLET | Refills: 2 | Status: SHIPPED | OUTPATIENT
Start: 2019-06-05 | End: 2019-09-17

## 2019-06-05 NOTE — TELEPHONE ENCOUNTER
Prescription approved per [facility] Refill Protocol.    Dulce Maria Rogers RN -- [facility]

## 2019-06-08 ENCOUNTER — MYC REFILL (OUTPATIENT)
Dept: FAMILY MEDICINE | Facility: CLINIC | Age: 54
End: 2019-06-08

## 2019-06-08 DIAGNOSIS — E11.8 CONTROLLED TYPE 2 DIABETES MELLITUS WITH COMPLICATION, WITHOUT LONG-TERM CURRENT USE OF INSULIN (H): ICD-10-CM

## 2019-06-08 DIAGNOSIS — I10 ESSENTIAL HYPERTENSION, BENIGN: ICD-10-CM

## 2019-06-08 DIAGNOSIS — E11.9 TYPE 2 DIABETES MELLITUS WITHOUT COMPLICATION (H): ICD-10-CM

## 2019-06-08 DIAGNOSIS — F33.42 MAJOR DEPRESSIVE DISORDER, RECURRENT EPISODE, IN FULL REMISSION (H): ICD-10-CM

## 2019-06-08 DIAGNOSIS — I10 HTN, GOAL BELOW 140/90: ICD-10-CM

## 2019-06-08 DIAGNOSIS — E78.5 HYPERLIPIDEMIA LDL GOAL <100: ICD-10-CM

## 2019-06-08 DIAGNOSIS — K21.9 GASTROESOPHAGEAL REFLUX DISEASE WITHOUT ESOPHAGITIS: ICD-10-CM

## 2019-06-08 RX ORDER — ATORVASTATIN CALCIUM 80 MG/1
TABLET, FILM COATED ORAL
Qty: 90 TABLET | Refills: 0 | Status: CANCELLED | OUTPATIENT
Start: 2019-06-08

## 2019-06-08 RX ORDER — LISINOPRIL 40 MG/1
TABLET ORAL
Qty: 90 TABLET | Refills: 2 | Status: CANCELLED | OUTPATIENT
Start: 2019-06-08

## 2019-06-08 RX ORDER — METOPROLOL SUCCINATE 100 MG/1
100 TABLET, EXTENDED RELEASE ORAL DAILY
Qty: 90 TABLET | Refills: 3 | Status: CANCELLED | OUTPATIENT
Start: 2019-06-08

## 2019-06-08 RX ORDER — FENOFIBRATE 160 MG/1
160 TABLET ORAL DAILY
Qty: 90 TABLET | Refills: 3 | Status: CANCELLED | OUTPATIENT
Start: 2019-06-08

## 2019-06-08 RX ORDER — OMEPRAZOLE 40 MG/1
CAPSULE, DELAYED RELEASE ORAL
Qty: 90 CAPSULE | Refills: 3 | Status: CANCELLED | OUTPATIENT
Start: 2019-06-08

## 2019-06-10 DIAGNOSIS — F33.42 MAJOR DEPRESSIVE DISORDER, RECURRENT EPISODE, IN FULL REMISSION (H): ICD-10-CM

## 2019-06-10 RX ORDER — PAROXETINE 20 MG/1
20 TABLET, FILM COATED ORAL AT BEDTIME
Qty: 90 TABLET | Refills: 0 | Status: CANCELLED | OUTPATIENT
Start: 2019-06-10

## 2019-06-10 NOTE — TELEPHONE ENCOUNTER
"PARoxetine (PAXIL) 20 MG tablet  Last Written Prescription Date:  03/09/19  Last Fill Quantity: 90,  # refills: 0   Last office visit: 3/13/2019 with prescribing provider:  Aggie     Future Office Visit:      Requested Prescriptions   Pending Prescriptions Disp Refills     PARoxetine (PAXIL) 20 MG tablet 90 tablet 0     Sig: Take 1 tablet (20 mg) by mouth At Bedtime       SSRIs Protocol Passed - 6/10/2019  9:20 AM        Passed - PHQ-9 score less than 5 in past 6 months     Please review last PHQ-9 score.           Passed - Medication is active on med list        Passed - Patient is age 18 or older        Passed - Recent (6 mo) or future (30 days) visit within the authorizing provider's specialty     Patient had office visit in the last 6 months or has a visit in the next 30 days with authorizing provider or within the authorizing provider's specialty.  See \"Patient Info\" tab in inbasket, or \"Choose Columns\" in Meds & Orders section of the refill encounter.              "

## 2019-06-11 NOTE — TELEPHONE ENCOUNTER
"Requested Prescriptions   Pending Prescriptions Disp Refills     blood glucose (ONETOUCH VERIO IQ) test strip 100 strip 11     Si strip by In Vitro route daily Use to test blood sugar 1 times daily or as directed.  Ok to substitute alternative if insurance prefers.   Last Written Prescription Date:  6/3/19  Last Fill Quantity: 100 strips,  # refills: 11   Last Office Visit: 3/13/2019   Future Office Visit:         Diabetic Supplies Protocol Passed - 2019 10:14 AM        Passed - Medication is active on med list        Passed - Patient is 18 years of age or older        Passed - Recent (6 mo) or future (30 days) visit within the authorizing provider's specialty     Patient had office visit in the last 6 months or has a visit in the next 30 days with authorizing provider.  See \"Patient Info\" tab in inbasket, or \"Choose Columns\" in Meds & Orders section of the refill encounter.            "

## 2019-06-11 NOTE — TELEPHONE ENCOUNTER
"Requested Prescriptions   Pending Prescriptions Disp Refills     blood glucose monitoring (ONE TOUCH ULTRASOFT) lancets       Sig: Use to test blood sugars 1 times daily or as directed.   Last Written Prescription Date:  10/23/15  Last Fill Quantity: 1 box ,  # refills: 11   Last Office Visit: 3/13/2019 Aggie      Return in about 6 months (around 9/13/2019) for wellness exam.     Future Office Visit:         Diabetic Supplies Protocol Passed - 6/11/2019 10:14 AM        Passed - Medication is active on med list        Passed - Patient is 18 years of age or older        Passed - Recent (6 mo) or future (30 days) visit within the authorizing provider's specialty     Patient had office visit in the last 6 months or has a visit in the next 30 days with authorizing provider.  See \"Patient Info\" tab in inbasket, or \"Choose Columns\" in Meds & Orders section of the refill encounter.       ________________________________________________________________________       atorvastatin (LIPITOR) 80 MG tablet 90 tablet 0   Last Written Prescription Date:  7/19/18  Last Fill Quantity: 90,  # refills: 0   Last Office Visit: 3/13/2019   Future Office Visit:         Statins Protocol Passed - 6/11/2019 10:14 AM        Passed - LDL on file in past 12 months     Recent Labs   Lab Test 03/13/19  1145   *             Passed - No abnormal creatine kinase in past 12 months     No lab results found.             Passed - Recent (12 mo) or future (30 days) visit within the authorizing provider's specialty     Patient had office visit in the last 12 months or has a visit in the next 30 days with authorizing provider or within the authorizing provider's specialty.  See \"Patient Info\" tab in inbasket, or \"Choose Columns\" in Meds & Orders section of the refill encounter.              Passed - Medication is active on med list        Passed - Patient is age 18 or older   ________________________________________________________________________   " "    hydrochlorothiazide (MICROZIDE) 12.5 MG capsule 90 capsule 1     Sig: Take 1 capsule (12.5 mg) by mouth daily   Last Written Prescription Date:  12/20/18  Last Fill Quantity: 90,  # refills: 1   Last Office Visit: 3/13/2019   Future Office Visit:         Diuretics (Including Combos) Protocol Passed - 6/11/2019 10:14 AM        Passed - Blood pressure under 140/90 in past 12 months     BP Readings from Last 3 Encounters:   03/13/19 138/80   09/12/18 132/71   08/27/18 132/72                 Passed - Recent (12 mo) or future (30 days) visit within the authorizing provider's specialty     Patient had office visit in the last 12 months or has a visit in the next 30 days with authorizing provider or within the authorizing provider's specialty.  See \"Patient Info\" tab in inbasket, or \"Choose Columns\" in Meds & Orders section of the refill encounter.              Passed - Medication is active on med list        Passed - Patient is age 18 or older        Passed - Normal serum creatinine on file in past 12 months     Recent Labs   Lab Test 03/13/19  1145   CR 1.08              Passed - Normal serum potassium on file in past 12 months     Recent Labs   Lab Test 03/13/19  1145   POTASSIUM 4.3                    Passed - Normal serum sodium on file in past 12 months     Recent Labs   Lab Test 03/13/19  1145            ________________________________________________________________________       PARoxetine (PAXIL) 20 MG tablet 90 tablet 0     Sig: Take 1 tablet (20 mg) by mouth At Bedtime   Last Written Prescription Date:  3/9/19  Last Fill Quantity: 90,  # refills: 0   Last Office Visit: 3/13/2019   Future Office Visit:         SSRIs Protocol Passed - 6/11/2019 10:14 AM        Passed - PHQ-9 score less than 5 in past 6 months     PHQ-9 SCORE 3/17/2018 9/27/2018 3/13/2019   PHQ-9 Total Score - - -   PHQ-9 Total Score MyChart 0 0 -   PHQ-9 Total Score 0 0 0     MCKENNA-7 SCORE 3/17/2018 9/27/2018 3/13/2019   Total Score - - " "-   Total Score 0 (minimal anxiety) 1 (minimal anxiety) -   Total Score 0 1 0               Passed - Medication is active on med list        Passed - Patient is age 18 or older        Passed - Recent (6 mo) or future (30 days) visit within the authorizing provider's specialty     Patient had office visit in the last 6 months or has a visit in the next 30 days with authorizing provider or within the authorizing provider's specialty.  See \"Patient Info\" tab in inbasket, or \"Choose Columns\" in Meds & Orders section of the refill encounter.       ________________________________________________________________________       omeprazole (PRILOSEC) 40 MG DR capsule 90 capsule 3     Sig: TAKE 1 CAPSULE EVERY DAY 30 - 60 MINUTES BEFORE A MEAL   Last Written Prescription Date:  3/19/19  Last Fill Quantity: 90,  # refills: 3   Last Office Visit: 3/13/2019   Future Office Visit:         PPI Protocol Passed - 6/11/2019 10:14 AM        Passed - Not on Clopidogrel (unless Pantoprazole ordered)        Passed - No diagnosis of osteoporosis on record        Passed - Recent (12 mo) or future (30 days) visit within the authorizing provider's specialty     Patient had office visit in the last 12 months or has a visit in the next 30 days with authorizing provider or within the authorizing provider's specialty.  See \"Patient Info\" tab in inbasket, or \"Choose Columns\" in Meds & Orders section of the refill encounter.              Passed - Medication is active on med list        Passed - Patient is age 18 or older   ________________________________________________________________________       metoprolol succinate ER (TOPROL-XL) 100 MG 24 hr tablet 90 tablet 3     Sig: Take 1 tablet (100 mg) by mouth daily TAKE 1 TABLET EVERY DAY       Beta-Blockers Protocol Passed - 6/11/2019 10:14 AM   Last Written Prescription Date:  3/19/19  Last Fill Quantity: 90,  # refills: 3   Last Office Visit: 3/13/2019   Future Office Visit:          Passed - " "Blood pressure under 140/90 in past 12 months     BP Readings from Last 3 Encounters:   03/13/19 138/80   09/12/18 132/71   08/27/18 132/72                 Passed - Patient is age 6 or older        Passed - Recent (12 mo) or future (30 days) visit within the authorizing provider's specialty     Patient had office visit in the last 12 months or has a visit in the next 30 days with authorizing provider or within the authorizing provider's specialty.  See \"Patient Info\" tab in inbasket, or \"Choose Columns\" in Meds & Orders section of the refill encounter.              Passed - Medication is active on med list   ________________________________________________________________________       fenofibrate (TRIGLIDE/LOFIBRA) 160 MG tablet 90 tablet 3     Sig: Take 1 tablet (160 mg) by mouth daily TAKE 1 TABLET EVERY DAY   Last Written Prescription Date:  3/19/19  Last Fill Quantity: 90,  # refills: 3   Last Office Visit: 3/13/2019   Future Office Visit:         Fibrates Passed - 6/11/2019 10:14 AM        Passed - Lipid panel on file in past 12 months     Recent Labs   Lab Test 03/13/19  1145  09/10/15  0814   CHOL 157   < > 155   TRIG 131   < > 168*   HDL 30*   < > 33*   *   < > 88   NHDL 127   < >  --    VLDL  --   --  34*   CHOLHDLRATIO  --   --  4.7    < > = values in this interval not displayed.               Passed - No abnormal creatine kinase in past 12 months     No lab results found.             Passed - Recent (12 mo) or future (30 days) visit within the authorizing provider's specialty     Patient had office visit in the last 12 months or has a visit in the next 30 days with authorizing provider or within the authorizing provider's specialty.  See \"Patient Info\" tab in inbasket, or \"Choose Columns\" in Meds & Orders section of the refill encounter.              Passed - Medication is active on med list        Passed - Patient is age 18 or older " "  ________________________________________________________________________       metFORMIN (GLUCOPHAGE) 500 MG tablet 180 tablet 1     Sig: Take 1 tablet (500 mg) by mouth 2 times daily (with meals) TAKE 1 TABLET TWICE DAILY WITH MEALS   Last Written Prescription Date:  3/19/19  Last Fill Quantity: 180,  # refills: 1   Last Office Visit: 3/13/2019   Future Office Visit:         Biguanide Agents Passed - 6/11/2019 10:14 AM        Passed - Blood pressure less than 140/90 in past 6 months     BP Readings from Last 3 Encounters:   03/13/19 138/80   09/12/18 132/71   08/27/18 132/72                 Passed - Patient has documented LDL within the past 12 mos.     Recent Labs   Lab Test 03/13/19  1145   *             Passed - Patient has had a Microalbumin in the past 15 mos.     Recent Labs   Lab Test 03/13/19  1147   MICROL 14   UMALCR 7.61             Passed - Patient is age 10 or older        Passed - Patient has documented A1c within the specified period of time.     If HgbA1C is 8 or greater, it needs to be on file within the past 3 months.  If less than 8, must be on file within the past 6 months.     Recent Labs   Lab Test 03/13/19  1145   A1C 7.2*             Passed - Patient's CR is NOT>1.4 OR Patient's EGFR is NOT<45 within past 12 mos.     Recent Labs   Lab Test 03/13/19  1145   GFRESTIMATED 77   GFRESTBLACK 89       Recent Labs   Lab Test 03/13/19  1145   CR 1.08             Passed - Patient does NOT have a diagnosis of CHF.        Passed - Medication is active on med list        Passed - Recent (6 mo) or future (30 days) visit within the authorizing provider's specialty     Patient had office visit in the last 6 months or has a visit in the next 30 days with authorizing provider or within the authorizing provider's specialty.  See \"Patient Info\" tab in inbasket, or \"Choose Columns\" in Meds & Orders section of the refill encounter.     " "  ________________________________________________________________________       atorvastatin (LIPITOR) 80 MG tablet 90 tablet 3     Sig: Take 1 tablet (80 mg) by mouth daily   Last Written Prescription Date:  7/19/18  Last Fill Quantity: 90,  # refills: 0   Last Office Visit: 3/13/2019   Future Office Visit:         Statins Protocol Passed - 6/11/2019 10:14 AM        Passed - LDL on file in past 12 months     Recent Labs   Lab Test 03/13/19  1145   *             Passed - No abnormal creatine kinase in past 12 months     No lab results found.             Passed - Recent (12 mo) or future (30 days) visit within the authorizing provider's specialty     Patient had office visit in the last 12 months or has a visit in the next 30 days with authorizing provider or within the authorizing provider's specialty.  See \"Patient Info\" tab in inbasket, or \"Choose Columns\" in Meds & Orders section of the refill encounter.              Passed - Medication is active on med list        Passed - Patient is age 18 or older   ________________________________________________________________________       lisinopril (PRINIVIL/ZESTRIL) 40 MG tablet 90 tablet 2   Last Written Prescription Date:  6/5/19  Last Fill Quantity: 90,  # refills: 2   Last Office Visit: 3/13/2019   Future Office Visit:         ACE Inhibitors (Including Combos) Protocol Passed - 6/11/2019 10:14 AM        Passed - Blood pressure under 140/90 in past 12 months     BP Readings from Last 3 Encounters:   03/13/19 138/80   09/12/18 132/71   08/27/18 132/72                 Passed - Recent (12 mo) or future (30 days) visit within the authorizing provider's specialty     Patient had office visit in the last 12 months or has a visit in the next 30 days with authorizing provider or within the authorizing provider's specialty.  See \"Patient Info\" tab in inbasket, or \"Choose Columns\" in Meds & Orders section of the refill encounter.              Passed - Medication is " active on med list        Passed - Patient is age 18 or older        Passed - Normal serum creatinine on file in past 12 months     Recent Labs   Lab Test 03/13/19  1145   CR 1.08             Passed - Normal serum potassium on file in past 12 months     Recent Labs   Lab Test 03/13/19  1145   POTASSIUM 4.3

## 2019-06-12 RX ORDER — LANCETS
EACH MISCELLANEOUS
Qty: 100 EACH | Refills: 5 | Status: SHIPPED | OUTPATIENT
Start: 2019-06-12

## 2019-06-12 RX ORDER — ATORVASTATIN CALCIUM 80 MG/1
80 TABLET, FILM COATED ORAL DAILY
Qty: 90 TABLET | Refills: 1 | Status: SHIPPED | OUTPATIENT
Start: 2019-06-12 | End: 2019-09-17

## 2019-06-12 RX ORDER — PAROXETINE 20 MG/1
20 TABLET, FILM COATED ORAL AT BEDTIME
Qty: 90 TABLET | Refills: 0 | Status: SHIPPED | OUTPATIENT
Start: 2019-06-12 | End: 2019-09-04

## 2019-06-12 RX ORDER — HYDROCHLOROTHIAZIDE 12.5 MG/1
12.5 CAPSULE ORAL DAILY
Qty: 90 CAPSULE | Refills: 1 | Status: SHIPPED | OUTPATIENT
Start: 2019-06-12 | End: 2019-09-17

## 2019-06-12 NOTE — TELEPHONE ENCOUNTER
6 requested medications do not need to be filled, reminder sent to pt to only request refills on meds without refills    Prescription approved per [facility] Refill Protocol  Briseida Palacio RN BS

## 2019-06-28 ENCOUNTER — TRANSFERRED RECORDS (OUTPATIENT)
Dept: HEALTH INFORMATION MANAGEMENT | Facility: CLINIC | Age: 54
End: 2019-06-28

## 2019-08-13 ENCOUNTER — TRANSFERRED RECORDS (OUTPATIENT)
Dept: HEALTH INFORMATION MANAGEMENT | Facility: CLINIC | Age: 54
End: 2019-08-13

## 2019-08-28 ENCOUNTER — TRANSFERRED RECORDS (OUTPATIENT)
Dept: HEALTH INFORMATION MANAGEMENT | Facility: CLINIC | Age: 54
End: 2019-08-28

## 2019-09-04 DIAGNOSIS — F33.42 MAJOR DEPRESSIVE DISORDER, RECURRENT EPISODE, IN FULL REMISSION (H): ICD-10-CM

## 2019-09-05 NOTE — TELEPHONE ENCOUNTER
"Requested Prescriptions   Pending Prescriptions Disp Refills     PARoxetine (PAXIL) 20 MG tablet [Pharmacy Med Name: PAROXETINE HYDROCHLORIDE 20 MG Tablet] 90 tablet 0     Sig: TAKE 1 TABLET AT BEDTIME   Last Written Prescription Date:  6/12/19  Last Fill Quantity: 90,  # refills: 0   Last Office Visit: 3/13/2019 [place]      Return in about 6 months (around 9/13/2019) for wellness exam.     Future Office Visit:    Next 5 appointments (look out 90 days)    Sep 17, 2019 10:00 AM CDT  PHYSICAL with Janis Marcial MD  [facility] ([facility] [address] [place] [address], [address]  [facility] [address] [phone]             SSRIs Protocol Passed - 9/4/2019  7:19 PM        Passed - PHQ-9 score less than 5 in past 6 months     PHQ-9 SCORE 3/17/2018 9/27/2018 3/13/2019   PHQ-9 Total Score - - -   PHQ-9 Total Score MyChart 0 0 -   PHQ-9 Total Score 0 0 0     MCKENNA-7 SCORE 3/17/2018 9/27/2018 3/13/2019   Total Score - - -   Total Score 0 (minimal anxiety) 1 (minimal anxiety) -   Total Score 0 1 0               Passed - Medication is active on med list        Passed - Patient is age 18 or older        Passed - Recent (6 mo) or future (30 days) visit within the authorizing provider's specialty     Patient had office visit in the last 6 months or has a visit in the next 30 days with authorizing provider or within the authorizing provider's specialty.  See \"Patient Info\" tab in inbasket, or \"Choose Columns\" in Meds & Orders section of the refill encounter.            "

## 2019-09-09 RX ORDER — PAROXETINE 20 MG/1
TABLET, FILM COATED ORAL
Qty: 30 TABLET | Refills: 0 | Status: SHIPPED | OUTPATIENT
Start: 2019-09-09 | End: 2019-09-17

## 2019-09-09 NOTE — TELEPHONE ENCOUNTER
Medication is being filled for 1 time refill only due to:  Patient needs to be seen because due for 6 month follow up.  has future appointment scheduled..   Floresita GARRETT RN   [facility] [place]

## 2019-09-11 ENCOUNTER — TRANSFERRED RECORDS (OUTPATIENT)
Dept: HEALTH INFORMATION MANAGEMENT | Facility: CLINIC | Age: 54
End: 2019-09-11

## 2019-09-17 ENCOUNTER — OFFICE VISIT (OUTPATIENT)
Dept: FAMILY MEDICINE | Facility: CLINIC | Age: 54
End: 2019-09-17
Payer: MEDICARE

## 2019-09-17 VITALS
TEMPERATURE: 98.3 F | HEART RATE: 50 BPM | SYSTOLIC BLOOD PRESSURE: 150 MMHG | DIASTOLIC BLOOD PRESSURE: 78 MMHG | OXYGEN SATURATION: 98 % | WEIGHT: 227 LBS | HEIGHT: 76 IN | BODY MASS INDEX: 27.64 KG/M2 | RESPIRATION RATE: 16 BRPM

## 2019-09-17 DIAGNOSIS — F33.42 MAJOR DEPRESSIVE DISORDER, RECURRENT EPISODE, IN FULL REMISSION (H): ICD-10-CM

## 2019-09-17 DIAGNOSIS — E78.5 HYPERLIPIDEMIA LDL GOAL <100: ICD-10-CM

## 2019-09-17 DIAGNOSIS — I24.0 ISCHEMIC HEART DISEASE DUE TO CORONARY ARTERY OBSTRUCTION (H): ICD-10-CM

## 2019-09-17 DIAGNOSIS — I10 HTN, GOAL BELOW 140/90: ICD-10-CM

## 2019-09-17 DIAGNOSIS — Z00.00 ROUTINE GENERAL MEDICAL EXAMINATION AT A HEALTH CARE FACILITY: Primary | ICD-10-CM

## 2019-09-17 DIAGNOSIS — E11.8 CONTROLLED TYPE 2 DIABETES MELLITUS WITH COMPLICATION, WITHOUT LONG-TERM CURRENT USE OF INSULIN (H): ICD-10-CM

## 2019-09-17 DIAGNOSIS — R19.8 ABDOMINAL FULLNESS IN RIGHT FLANK: ICD-10-CM

## 2019-09-17 DIAGNOSIS — B35.3 TINEA PEDIS OF BOTH FEET: ICD-10-CM

## 2019-09-17 DIAGNOSIS — G47.33 OSA (OBSTRUCTIVE SLEEP APNEA): ICD-10-CM

## 2019-09-17 DIAGNOSIS — I25.9 ISCHEMIC HEART DISEASE DUE TO CORONARY ARTERY OBSTRUCTION (H): ICD-10-CM

## 2019-09-17 DIAGNOSIS — L30.9 DERMATITIS: ICD-10-CM

## 2019-09-17 LAB
ERYTHROCYTE [DISTWIDTH] IN BLOOD BY AUTOMATED COUNT: 12.4 % (ref 10–15)
HBA1C MFR BLD: 5.9 % (ref 0–5.6)
HCT VFR BLD AUTO: 36.2 % (ref 40–53)
HGB BLD-MCNC: 12.1 G/DL (ref 13.3–17.7)
MCH RBC QN AUTO: 30.8 PG (ref 26.5–33)
MCHC RBC AUTO-ENTMCNC: 33.4 G/DL (ref 31.5–36.5)
MCV RBC AUTO: 92 FL (ref 78–100)
PLATELET # BLD AUTO: 198 10E9/L (ref 150–450)
RBC # BLD AUTO: 3.93 10E12/L (ref 4.4–5.9)
WBC # BLD AUTO: 5.9 10E9/L (ref 4–11)

## 2019-09-17 PROCEDURE — 99214 OFFICE O/P EST MOD 30 MIN: CPT | Mod: 25 | Performed by: FAMILY MEDICINE

## 2019-09-17 PROCEDURE — 80053 COMPREHEN METABOLIC PANEL: CPT | Performed by: FAMILY MEDICINE

## 2019-09-17 PROCEDURE — 84443 ASSAY THYROID STIM HORMONE: CPT | Performed by: FAMILY MEDICINE

## 2019-09-17 PROCEDURE — 99396 PREV VISIT EST AGE 40-64: CPT | Performed by: FAMILY MEDICINE

## 2019-09-17 PROCEDURE — 83036 HEMOGLOBIN GLYCOSYLATED A1C: CPT | Performed by: FAMILY MEDICINE

## 2019-09-17 PROCEDURE — 36415 COLL VENOUS BLD VENIPUNCTURE: CPT | Performed by: FAMILY MEDICINE

## 2019-09-17 PROCEDURE — 85027 COMPLETE CBC AUTOMATED: CPT | Performed by: FAMILY MEDICINE

## 2019-09-17 RX ORDER — CLOTRIMAZOLE 1 %
CREAM (GRAM) TOPICAL 2 TIMES DAILY
Qty: 113 G | Refills: 5 | Status: SHIPPED | OUTPATIENT
Start: 2019-09-17 | End: 2020-04-04

## 2019-09-17 RX ORDER — DIAPER,BRIEF,INFANT-TODD,DISP
EACH MISCELLANEOUS 2 TIMES DAILY
Qty: 1 TUBE | Refills: 3 | Status: SHIPPED | OUTPATIENT
Start: 2019-09-17 | End: 2020-04-04

## 2019-09-17 RX ORDER — PAROXETINE 20 MG/1
20 TABLET, FILM COATED ORAL AT BEDTIME
Qty: 90 TABLET | Refills: 1 | Status: SHIPPED | OUTPATIENT
Start: 2019-09-17 | End: 2020-04-04

## 2019-09-17 RX ORDER — HYDROCHLOROTHIAZIDE 12.5 MG/1
12.5 CAPSULE ORAL DAILY
Qty: 90 CAPSULE | Refills: 1 | Status: SHIPPED | OUTPATIENT
Start: 2019-09-17 | End: 2020-04-04

## 2019-09-17 RX ORDER — LISINOPRIL 40 MG/1
40 TABLET ORAL DAILY
Qty: 90 TABLET | Refills: 1 | Status: SHIPPED | OUTPATIENT
Start: 2019-09-17 | End: 2020-04-04

## 2019-09-17 RX ORDER — ATORVASTATIN CALCIUM 80 MG/1
80 TABLET, FILM COATED ORAL DAILY
Qty: 90 TABLET | Refills: 1 | Status: SHIPPED | OUTPATIENT
Start: 2019-09-17 | End: 2020-04-04

## 2019-09-17 ASSESSMENT — ENCOUNTER SYMPTOMS
MYALGIAS: 0
CHILLS: 0
FEVER: 0
HEADACHES: 0
NERVOUS/ANXIOUS: 0
DIARRHEA: 0
NAUSEA: 0
ARTHRALGIAS: 0
DIZZINESS: 0
PALPITATIONS: 0
DYSURIA: 0
ABDOMINAL PAIN: 0
FREQUENCY: 0
HEMATOCHEZIA: 0
EYE PAIN: 0
HEMATURIA: 0
SORE THROAT: 0
WEAKNESS: 0
SHORTNESS OF BREATH: 0
JOINT SWELLING: 0
COUGH: 0
HEARTBURN: 0
PARESTHESIAS: 0
CONSTIPATION: 0

## 2019-09-17 ASSESSMENT — ANXIETY QUESTIONNAIRES
3. WORRYING TOO MUCH ABOUT DIFFERENT THINGS: NOT AT ALL
5. BEING SO RESTLESS THAT IT IS HARD TO SIT STILL: NOT AT ALL
1. FEELING NERVOUS, ANXIOUS, OR ON EDGE: NOT AT ALL
GAD7 TOTAL SCORE: 0
6. BECOMING EASILY ANNOYED OR IRRITABLE: NOT AT ALL
IF YOU CHECKED OFF ANY PROBLEMS ON THIS QUESTIONNAIRE, HOW DIFFICULT HAVE THESE PROBLEMS MADE IT FOR YOU TO DO YOUR WORK, TAKE CARE OF THINGS AT HOME, OR GET ALONG WITH OTHER PEOPLE: NOT DIFFICULT AT ALL
2. NOT BEING ABLE TO STOP OR CONTROL WORRYING: NOT AT ALL
7. FEELING AFRAID AS IF SOMETHING AWFUL MIGHT HAPPEN: NOT AT ALL

## 2019-09-17 ASSESSMENT — ACTIVITIES OF DAILY LIVING (ADL): CURRENT_FUNCTION: NO ASSISTANCE NEEDED

## 2019-09-17 ASSESSMENT — PATIENT HEALTH QUESTIONNAIRE - PHQ9
SUM OF ALL RESPONSES TO PHQ QUESTIONS 1-9: 0
5. POOR APPETITE OR OVEREATING: NOT AT ALL

## 2019-09-17 ASSESSMENT — MIFFLIN-ST. JEOR: SCORE: 1961.17

## 2019-09-17 NOTE — PATIENT INSTRUCTIONS
Preventive Health Recommendations  Male Ages 50 - 64    Yearly exam:             See your health care provider every year in order to  o   Review health changes.   o   Discuss preventive care.    o   Review your medicines if your doctor has prescribed any.     Have a cholesterol test every 5 years, or more frequently if you are at risk for high cholesterol/heart disease.     Have a diabetes test (fasting glucose) every three years. If you are at risk for diabetes, you should have this test more often.     Have a colonoscopy at age 50, or have a yearly FIT test (stool test). These exams will check for colon cancer.      Talk with your health care provider about whether or not a prostate cancer screening test (PSA) is right for you.    You should be tested each year for STDs (sexually transmitted diseases), if you re at risk.     Shots: Get a flu shot each year. Get a tetanus shot every 10 years.     Nutrition:    Eat at least 5 servings of fruits and vegetables daily.     Eat whole-grain bread, whole-wheat pasta and brown rice instead of white grains and rice.     Get adequate Calcium and Vitamin D.     Lifestyle    Exercise for at least 150 minutes a week (30 minutes a day, 5 days a week). This will help you control your weight and prevent disease.     Limit alcohol to one drink per day.     No smoking.     Wear sunscreen to prevent skin cancer.     See your dentist every six months for an exam and cleaning.     See your eye doctor every 1 to 2 years.    Get the shingrix and flu shot

## 2019-09-17 NOTE — PROGRESS NOTES
"SUBJECTIVE:   CC: Edd Fong is an 54 year old male who presents for preventative health visit.     Healthy Habits:     In general, how would you rate your overall health?  Good    Frequency of exercise:  2-3 days/week    Duration of exercise:  30-45 minutes    Do you usually eat at least 4 servings of fruit and vegetables a day, include whole grains    & fiber and avoid regularly eating high fat or \"junk\" foods?  Yes    Taking medications regularly:  Yes    Medication side effects:  None    Ability to successfully perform activities of daily living:  No assistance needed    Home Safety:  No safety concerns identified    Hearing Impairment:  No hearing concerns    In the past 6 months, have you been bothered by leaking of urine?  No    In general, how would you rate your overall mental or emotional health?  Excellent      PHQ-2 Total Score: 0    Additional concerns today:  No    Derm  He's had a red rash on the corner of his mouth for the past week. He thought it was due to irritation from his toothpaste, so he switched brands. He has been applying Aquaphor.     Also requesting to have feet checked for any fungal infection. He was using a cream, but stopped.     Internal hemorrhoids  He recently had a sigmoidoscopy on 8/28/2019 for evaluation of rectal bleeding. Results found internal hemorrhoids, and he's been following with GI for banding of the hemorrhoids. He has been trying to follow a high fiber diet and now takes Metamucil daily.     Ischemic heart disease due to coronary artery obstruction  Patient has not been seen by cardiology recently, was being seen at [facility]. Last notes received were in 2016 and they recommended a yearly follow up. Notes that he continues taking aspirin 81 mg daily.     TERESSA  Using CPAP and recently seen by pulmonology on 4/2019.     Depression     PHQ-9 SCORE 3/17/2018 9/27/2018 3/13/2019   PHQ-9 Total Score - - -   PHQ-9 Total Score MyChart 0 0 -   PHQ-9 Total Score 0 0 0 "     MCKENNA-7 SCORE 3/17/2018 9/27/2018 3/13/2019   Total Score - - -   Total Score 0 (minimal anxiety) 1 (minimal anxiety) -   Total Score 0 1 0     Patient continues doing well with Paxil.     Hyperlipidemia    Recent Labs   Lab Test 03/13/19  1145 08/01/18  0803  09/10/15  0814 04/20/15  0804   CHOL 157 191   < > 155 167   HDL 30* 31*   < > 33* 34*   * 126*   < > 88 103   TRIG 131 168*   < > 168* 151*   CHOLHDLRATIO  --   --   --  4.7 4.9    < > = values in this interval not displayed.     Taking fenofibrate 160 mg and atorvastatin 80 mg daily.     Diabetes    Lab Results   Component Value Date    A1C 7.2 03/13/2019    A1C 6.5 08/27/2018    A1C 6.6 03/19/2018    A1C 6.6 01/16/2018    A1C 6.3 08/02/2017     Requesting a refill of metformin. This morning glucose levels were 118 mg/dL. He did see diabetes educator a few months ago, was not sure if he had to follow up.     Hypertension    BP Readings from Last 3 Encounters:   09/17/19 (!) 150/78   03/13/19 138/80   09/12/18 132/71     BP elevated today. He's been trying to change his diet--switching between 1% milk and raisin bran and omelettes. He has lost ~5lbs. Notes that he switched to [employer], now going 3x week.     Other problems to add on...  -He was seen by Dr. Gimenez of heme/onc in the past for anemia, no longer needed to follow up.   -Recently saw ENT in June      Today's PHQ-2 Score:   PHQ-2 ( 1999 Pf[employer]) 9/17/2019   Q1: Little interest or pleasure in doing things 0   Q2: Feeling down, depressed or hopeless 0   PHQ-2 Score 0   Q1: Little interest or pleasure in doing things Not at all   Q2: Feeling down, depressed or hopeless Not at all   PHQ-2 Score 0     Abuse: Current or Past(Physical, Sexual or Emotional)- No  Do you feel safe in your environment? Yes    Social History     Tobacco Use     Smoking status: Never Smoker     Smokeless tobacco: Never Used   Substance Use Topics     Alcohol use: Yes     Alcohol/week: 0.0 oz     Comment: 1 beer a day      If you drink alcohol do you typically have >3 drinks per day or >7 drinks per week? No    Alcohol Use 9/17/2019   Prescreen: >3 drinks/day or >7 drinks/week? No   Prescreen: >3 drinks/day or >7 drinks/week? -     Last PSA:   PSA   Date Value Ref Range Status   11/06/2018 0.26 0 - 4 ug/L Final     Comment:     Assay Method:  Chemiluminescence using Siemens Vista analyzer       Reviewed orders with patient. Reviewed health maintenance and updated orders accordingly - Yes  BP Readings from Last 3 Encounters:   09/17/19 (!) 150/78   03/13/19 138/80   09/12/18 132/71    Wt Readings from Last 3 Encounters:   09/17/19 103 kg (227 lb)   06/03/19 102.7 kg (226 lb 8 oz)   03/13/19 105.8 kg (233 lb 4.8 oz)          Recent Labs   Lab Test 03/13/19  1145 08/27/18  1057 08/01/18  0803 03/19/18  0812  08/17/17  1034 08/02/17  0830  03/15/16  0931   A1C 7.2* 6.5*  --  6.6*   < >  --  6.3*   < >  --    *  --  126*  --   --   --  105*   < > 109*   HDL 30*  --  31*  --   --   --  33*   < > 33*   TRIG 131  --  168*  --   --   --  199*   < > 195*   ALT 31 32  --  32   < > 29  --    < > 34   CR 1.08 1.10  --  1.17   < > 1.08  --    < > 1.17   GFRESTIMATED 77 69  --  65   < > 71  --    < > 65   GFRESTBLACK 89 84  --  78   < > 86  --    < > 79   POTASSIUM 4.3 4.1  --  4.2   < > 4.3  --    < > 4.4   TSH  --   --   --   --   --  2.30  --   --  2.99    < > = values in this interval not displayed.        Reviewed and updated as needed this visit by clinical staff  Tobacco  Allergies  Meds  Problems  Med Hx  Surg Hx  Fam Hx  Soc Hx          Reviewed and updated as needed this visit by Provider  Tobacco  Allergies  Meds  Problems  Med Hx  Surg Hx  Fam Hx          Review of Systems   Constitutional: Negative for chills and fever.   HENT: Negative for congestion, ear pain, hearing loss and sore throat.    Eyes: Negative for pain and visual disturbance.   Respiratory: Negative for cough and shortness of breath.   "  Cardiovascular: Negative for chest pain, palpitations and peripheral edema.   Gastrointestinal: Negative for abdominal pain, constipation, diarrhea, heartburn, hematochezia and nausea.   Genitourinary: Negative for discharge, dysuria, frequency, genital sores, hematuria, impotence and urgency.   Musculoskeletal: Negative for arthralgias, joint swelling and myalgias.   Skin: Negative for rash.   Neurological: Negative for dizziness, weakness, headaches and paresthesias.   Psychiatric/Behavioral: Negative for mood changes. The patient is not nervous/anxious.      This document serves as a record of the services and decisions personally performed and made by Janis Marcial MD. It was created on her behalf by Mireya Banks, a trained medical scribe. The creation of this document is based on the provider's statements to the medical scribe.  Mireya Banks September 17, 2019 10:24 AM     OBJECTIVE:   BP (!) 150/78 (BP Location: Right arm, Patient Position: Sitting, Cuff Size: Adult Large)   Pulse 50   Temp 98.3  F (36.8  C) (Oral)   Resp 16   Ht 1.93 m (6' 4\")   Wt 103 kg (227 lb)   SpO2 98%   BMI 27.63 kg/m      Physical Exam  GENERAL: healthy, alert and no distress  EYES: Eyes grossly normal to inspection and conjunctivae and sclerae normal  HENT: hearing aids. ear canals and TM's normal, nose and mouth without ulcers or lesions  NECK: no adenopathy, no asymmetry, masses, or scars and thyroid normal to palpation  RESP: lungs clear to auscultation - no rales, rhonchi or wheezes  CV: regular rate and rhythm, normal S1 S2, no S3 or S4, no murmur, click or rub, no peripheral edema and peripheral pulses strong  ABDOMEN: Fullness in lower right quadrant. No pain or guarding. Ventral hernia noted.  soft, nontender, no hepatosplenomegaly, no masses and bowel sounds normal  MS: no gross musculoskeletal defects noted, no edema  SKIN: Red rash near right corner of mouth Toe nail changes in right foot consistent with " onychomycosis. Skin changes on both feet consistent with tinea pedis. otherwise, no suspicious lesions   NEURO: Normal strength and tone, mentation intact and speech normal  PSYCH: mentation appears normal, affect normal/bright      Diagnostic Test Results:  Labs reviewed in Epic  No results found for this or any previous visit (from the past 24 hour(s)).    ASSESSMENT/PLAN:   (Z00.00) Routine general medical examination at a health care facility  (primary encounter diagnosis)  Comment: Checked PSA 6 months ago-normal, skipping this year. Patient reports no urinary symptoms and he has no family hx of prostate cancer.   Plan: TSH WITH FREE T4 REFLEX, HEMOGLOBIN A1C,         Comprehensive metabolic panel, CBC with         platelets          (F33.42) Major depressive disorder, recurrent episode, in full remission (H)  Comment: Patient doing well, refilled rx  Plan: PARoxetine (PAXIL) 20 MG tablet          (E11.8) Controlled type 2 diabetes mellitus with complication, without long-term current use of insulin (H)  Comment: Checking A1c today. If still high, recommended following up with diabetes educator. Continue metformin and working on diet and exercise  Plan: HEMOGLOBIN A1C, metFORMIN (GLUCOPHAGE) 500 MG         tablet          (G47.33) TERESSA (obstructive sleep apnea)  Comment: Using CPAP nightly    (E78.5) Hyperlipidemia LDL goal <100  Comment: Checked LDL last year, stable. Continue meds  Plan: atorvastatin (LIPITOR) 80 MG tablet          (I10) HTN, goal below 140/90  Comment: BP elevated today. Encouraged patient to continue with improving diet and aim for exercise 4x week.   Plan: Comprehensive metabolic panel, CBC with         platelets, hydrochlorothiazide (MICROZIDE) 12.5        MG capsule, lisinopril (PRINIVIL/ZESTRIL) 40 MG        tablet          (I24.0) Ischemic heart disease due to coronary artery obstruction (H)  Comment: Reminded patient to follow up with cardiology yearly. He is over due  Plan: CARDIOLOGY  "EVAL ADULT REFERRAL          (L30.9) Dermatitis  Comment: Recommended applying a small amount of hydrocortisone cream. Follow up if rash does not clear.   Plan: hydrocortisone (CORTAID) 1 % external ointment          (B35.3) Tinea pedis of both feet  Comment: start clotrimazole cream  Plan: clotrimazole (LOTRIMIN) 1 % external cream          (R19.8) Abdominal fullness in right flank  Comment: Fullness noted in lower right quadrant, recommended US for further evaluation.   Plan: US Abdomen Complete            Follow up in 6 months    COUNSELING:   Reviewed preventive health counseling, as reflected in patient instructions       Regular exercise       Healthy diet/nutrition       Immunizations--due for shingles, tetanus, and influenza, patient will [facility] this.        HIV screeninx in teen years, 1x in adult years, and at intervals if high risk--pt declined       Colon cancer screening       Prostate cancer screening    Estimated body mass index is 27.63 kg/m  as calculated from the following:    Height as of this encounter: 1.93 m (6' 4\").    Weight as of this encounter: 103 kg (227 lb).  Weight management plan: Discussed healthy diet and exercise guidelines   reports that he has never smoked. He has never used smokeless tobacco.    Counseling Resources:  ATP IV Guidelines  Pooled Cohorts Equation Calculator  FRAX Risk Assessment  ICSI Preventive Guidelines  Dietary Guidelines for Americans,   USDA's MyPlate  ASA Prophylaxis  Lung CA Screening      The information in this document, created by the medical scribe for me, accurately reflects the services I personally performed and the decisions made by me. I have reviewed and approved this document for accuracy prior to leaving the patient care area.  2019 10:55 AM    Janis Marcial MD  [facility]  "

## 2019-09-18 LAB
ALBUMIN SERPL-MCNC: 4.3 G/DL (ref 3.4–5)
ALP SERPL-CCNC: 70 U/L (ref 40–150)
ALT SERPL W P-5'-P-CCNC: 29 U/L (ref 0–70)
ANION GAP SERPL CALCULATED.3IONS-SCNC: 3 MMOL/L (ref 3–14)
AST SERPL W P-5'-P-CCNC: 18 U/L (ref 0–45)
BILIRUB SERPL-MCNC: 0.4 MG/DL (ref 0.2–1.3)
BUN SERPL-MCNC: 23 MG/DL (ref 7–30)
CALCIUM SERPL-MCNC: 9 MG/DL (ref 8.5–10.1)
CHLORIDE SERPL-SCNC: 105 MMOL/L (ref 94–109)
CO2 SERPL-SCNC: 28 MMOL/L (ref 20–32)
CREAT SERPL-MCNC: 1.03 MG/DL (ref 0.66–1.25)
GFR SERPL CREATININE-BSD FRML MDRD: 81 ML/MIN/{1.73_M2}
GLUCOSE SERPL-MCNC: 107 MG/DL (ref 70–99)
POTASSIUM SERPL-SCNC: 3.9 MMOL/L (ref 3.4–5.3)
PROT SERPL-MCNC: 7 G/DL (ref 6.8–8.8)
SODIUM SERPL-SCNC: 136 MMOL/L (ref 133–144)
TSH SERPL DL<=0.005 MIU/L-ACNC: 1.59 MU/L (ref 0.4–4)

## 2019-09-18 ASSESSMENT — ANXIETY QUESTIONNAIRES: GAD7 TOTAL SCORE: 0

## 2019-09-23 ENCOUNTER — HOSPITAL ENCOUNTER (OUTPATIENT)
Dept: ULTRASOUND IMAGING | Facility: CLINIC | Age: 54
Discharge: HOME | End: 2019-09-23
Attending: FAMILY MEDICINE | Admitting: FAMILY MEDICINE
Payer: MEDICARE

## 2019-09-23 DIAGNOSIS — R19.8 ABDOMINAL FULLNESS IN RIGHT FLANK: ICD-10-CM

## 2019-09-23 PROCEDURE — 76700 US EXAM ABDOM COMPLETE: CPT

## 2019-09-27 ENCOUNTER — HEALTH MAINTENANCE LETTER (OUTPATIENT)
Age: 54
End: 2019-09-27

## 2019-10-30 ENCOUNTER — TRANSFERRED RECORDS (OUTPATIENT)
Dept: HEALTH INFORMATION MANAGEMENT | Facility: CLINIC | Age: 54
End: 2019-10-30

## 2019-12-06 ENCOUNTER — TRANSFERRED RECORDS (OUTPATIENT)
Dept: HEALTH INFORMATION MANAGEMENT | Facility: CLINIC | Age: 54
End: 2019-12-06

## 2020-01-03 ENCOUNTER — MYC MEDICAL ADVICE (OUTPATIENT)
Dept: FAMILY MEDICINE | Facility: CLINIC | Age: 55
End: 2020-01-03

## 2020-01-03 ENCOUNTER — TELEPHONE (OUTPATIENT)
Dept: FAMILY MEDICINE | Facility: CLINIC | Age: 55
End: 2020-01-03

## 2020-01-03 DIAGNOSIS — E11.8 CONTROLLED TYPE 2 DIABETES MELLITUS WITH COMPLICATION, WITHOUT LONG-TERM CURRENT USE OF INSULIN (H): Primary | ICD-10-CM

## 2020-01-03 DIAGNOSIS — E78.5 HYPERLIPIDEMIA LDL GOAL <100: ICD-10-CM

## 2020-01-03 NOTE — TELEPHONE ENCOUNTER
Panel Management Review      Patient has the following on his problem list:   Depression / Dysthymia review    Measure:  Needs PHQ-9 score of 4 or less during index window.  Administer PHQ-9 and if score is 5 or more, send encounter to provider for next steps.    5 - 7 month window range: 03/17/20    PHQ-9 SCORE 9/27/2018 3/13/2019 9/17/2019   PHQ-9 Total Score - - -   PHQ-9 Total Score MyChart 0 - -   PHQ-9 Total Score 0 0 0       If PHQ-9 recheck is 5 or more, route to provider for next steps.    Patient is due for:  None    Diabetes    ASA: Passed    Last A1C  Lab Results   Component Value Date    A1C 5.9 09/17/2019    A1C 7.2 03/13/2019    A1C 6.5 08/27/2018    A1C 6.6 03/19/2018    A1C 6.6 01/16/2018     A1C tested: FAILED    Last LDL:    Lab Results   Component Value Date    CHOL 157 03/13/2019     Lab Results   Component Value Date    HDL 30 03/13/2019     Lab Results   Component Value Date     03/13/2019     Lab Results   Component Value Date    TRIG 131 03/13/2019     Lab Results   Component Value Date    CHOLHDLRATIO 4.7 09/10/2015     Lab Results   Component Value Date    NHDL 127 03/13/2019       Is the patient on a Statin? YES             Is the patient on Aspirin? YES    Medications     HMG CoA Reductase Inhibitors     atorvastatin (LIPITOR) 80 MG tablet       Salicylates     aspirin 81 MG chewable tablet             Last three blood pressure readings:  BP Readings from Last 3 Encounters:   09/17/19 (!) 150/78   03/13/19 138/80   09/12/18 132/71       Date of last diabetes office visit: 09/17/19     Tobacco History:     History   Smoking Status     Never Smoker   Smokeless Tobacco     Never Used         Hypertension   Last three blood pressure readings:  BP Readings from Last 3 Encounters:   09/17/19 (!) 150/78   03/13/19 138/80   09/12/18 132/71     Blood pressure: FAILED    HTN Guidelines:  Less than 140/90      Composite cancer screening  Chart review shows that this patient is due/due soon  for the following None  Summary:    Patient is due/failing the following:   Lipid and BP CHECK    Action needed:   Patient needs fasting lab only appointment and a nurse only BLOOD PRESSURE check appointment.    Type of outreach:    Sent [facility] [employer] message.    Questions for provider review:    None                                                                                                                                    Lisa Magill, CMA       Chart routed to Care Team .

## 2020-01-14 ENCOUNTER — ALLIED HEALTH/NURSE VISIT (OUTPATIENT)
Dept: NURSING | Facility: CLINIC | Age: 55
End: 2020-01-14
Payer: MEDICARE

## 2020-01-14 VITALS — SYSTOLIC BLOOD PRESSURE: 138 MMHG | DIASTOLIC BLOOD PRESSURE: 80 MMHG | HEART RATE: 60 BPM

## 2020-01-14 DIAGNOSIS — I10 ESSENTIAL HYPERTENSION, BENIGN: Primary | ICD-10-CM

## 2020-01-14 DIAGNOSIS — E11.8 CONTROLLED TYPE 2 DIABETES MELLITUS WITH COMPLICATION, WITHOUT LONG-TERM CURRENT USE OF INSULIN (H): ICD-10-CM

## 2020-01-14 DIAGNOSIS — E78.5 HYPERLIPIDEMIA LDL GOAL <100: ICD-10-CM

## 2020-01-14 LAB — HBA1C MFR BLD: 6.5 % (ref 0–5.6)

## 2020-01-14 PROCEDURE — 36415 COLL VENOUS BLD VENIPUNCTURE: CPT | Performed by: FAMILY MEDICINE

## 2020-01-14 PROCEDURE — 80053 COMPREHEN METABOLIC PANEL: CPT | Performed by: FAMILY MEDICINE

## 2020-01-14 PROCEDURE — 80061 LIPID PANEL: CPT | Performed by: FAMILY MEDICINE

## 2020-01-14 PROCEDURE — 99207 ZZC NO CHARGE NURSE ONLY: CPT

## 2020-01-14 PROCEDURE — 83036 HEMOGLOBIN GLYCOSYLATED A1C: CPT | Performed by: FAMILY MEDICINE

## 2020-01-14 NOTE — NURSING NOTE
"Chief Complaint   Patient presents with     Allied Health Visit     B/P check     Initial /80 (BP Location: Right arm, Patient Position: Sitting, Cuff Size: Adult Large)   Pulse 60  Estimated body mass index is 27.63 kg/m  as calculated from the following:    Height as of 9/17/19: 1.93 m (6' 4\").    Weight as of 9/17/19: 103 kg (227 lb).  BP completed using cuff size large RIGHT arm    Lisa Magill, CMA    "

## 2020-01-14 NOTE — PROGRESS NOTES
Edd Fong is a 54 year old patient who comes in today for a Blood Pressure check.  Initial BP:  /80 (BP Location: Right arm, Patient Position: Sitting, Cuff Size: Adult Large)   Pulse 60      60  Disposition: BP elevated, provider notified while patient in the clinic.  Patient asked to wait about 10 minutes and then rechecked blood pressure again.      Lisa Magill, CMA

## 2020-01-15 LAB
ALBUMIN SERPL-MCNC: 4.2 G/DL (ref 3.4–5)
ALP SERPL-CCNC: 53 U/L (ref 40–150)
ALT SERPL W P-5'-P-CCNC: 24 U/L (ref 0–70)
ANION GAP SERPL CALCULATED.3IONS-SCNC: 8 MMOL/L (ref 3–14)
AST SERPL W P-5'-P-CCNC: 17 U/L (ref 0–45)
BILIRUB SERPL-MCNC: 0.4 MG/DL (ref 0.2–1.3)
BUN SERPL-MCNC: 25 MG/DL (ref 7–30)
CALCIUM SERPL-MCNC: 8.9 MG/DL (ref 8.5–10.1)
CHLORIDE SERPL-SCNC: 105 MMOL/L (ref 94–109)
CHOLEST SERPL-MCNC: 133 MG/DL
CO2 SERPL-SCNC: 27 MMOL/L (ref 20–32)
CREAT SERPL-MCNC: 1.02 MG/DL (ref 0.66–1.25)
GFR SERPL CREATININE-BSD FRML MDRD: 82 ML/MIN/{1.73_M2}
GLUCOSE SERPL-MCNC: 113 MG/DL (ref 70–99)
HDLC SERPL-MCNC: 36 MG/DL
LDLC SERPL CALC-MCNC: 75 MG/DL
NONHDLC SERPL-MCNC: 97 MG/DL
POTASSIUM SERPL-SCNC: 4.1 MMOL/L (ref 3.4–5.3)
PROT SERPL-MCNC: 6.7 G/DL (ref 6.8–8.8)
SODIUM SERPL-SCNC: 140 MMOL/L (ref 133–144)
TRIGL SERPL-MCNC: 108 MG/DL

## 2020-04-03 DIAGNOSIS — E78.5 HYPERLIPIDEMIA LDL GOAL <100: ICD-10-CM

## 2020-04-03 DIAGNOSIS — K21.9 GASTROESOPHAGEAL REFLUX DISEASE WITHOUT ESOPHAGITIS: ICD-10-CM

## 2020-04-04 ENCOUNTER — MYC REFILL (OUTPATIENT)
Dept: FAMILY MEDICINE | Facility: CLINIC | Age: 55
End: 2020-04-04

## 2020-04-04 DIAGNOSIS — K59.04 CHRONIC IDIOPATHIC CONSTIPATION: Primary | ICD-10-CM

## 2020-04-04 DIAGNOSIS — E11.8 CONTROLLED TYPE 2 DIABETES MELLITUS WITH COMPLICATION, WITHOUT LONG-TERM CURRENT USE OF INSULIN (H): ICD-10-CM

## 2020-04-04 DIAGNOSIS — B35.6 TINEA CRURIS: ICD-10-CM

## 2020-04-04 DIAGNOSIS — I25.9 ISCHEMIC HEART DISEASE DUE TO CORONARY ARTERY OBSTRUCTION (H): ICD-10-CM

## 2020-04-04 DIAGNOSIS — F33.42 MAJOR DEPRESSIVE DISORDER, RECURRENT EPISODE, IN FULL REMISSION (H): ICD-10-CM

## 2020-04-04 DIAGNOSIS — I10 ESSENTIAL HYPERTENSION, BENIGN: ICD-10-CM

## 2020-04-04 DIAGNOSIS — B35.3 TINEA PEDIS OF BOTH FEET: ICD-10-CM

## 2020-04-04 DIAGNOSIS — K21.9 GASTROESOPHAGEAL REFLUX DISEASE WITHOUT ESOPHAGITIS: ICD-10-CM

## 2020-04-04 DIAGNOSIS — I24.0 ISCHEMIC HEART DISEASE DUE TO CORONARY ARTERY OBSTRUCTION (H): ICD-10-CM

## 2020-04-04 DIAGNOSIS — I10 HTN, GOAL BELOW 140/90: ICD-10-CM

## 2020-04-04 DIAGNOSIS — L30.9 DERMATITIS: ICD-10-CM

## 2020-04-04 DIAGNOSIS — E78.5 HYPERLIPIDEMIA LDL GOAL <100: ICD-10-CM

## 2020-04-04 RX ORDER — METOPROLOL SUCCINATE 100 MG/1
TABLET, EXTENDED RELEASE ORAL
Qty: 90 TABLET | Refills: 1 | Status: CANCELLED | OUTPATIENT
Start: 2020-04-04

## 2020-04-04 RX ORDER — OMEPRAZOLE 40 MG/1
CAPSULE, DELAYED RELEASE ORAL
Qty: 90 CAPSULE | Refills: 3 | Status: CANCELLED | OUTPATIENT
Start: 2020-04-04

## 2020-04-04 RX ORDER — FENOFIBRATE 160 MG/1
160 TABLET ORAL DAILY
Qty: 90 TABLET | Refills: 3 | Status: CANCELLED | OUTPATIENT
Start: 2020-04-04

## 2020-04-06 ENCOUNTER — MYC MEDICAL ADVICE (OUTPATIENT)
Dept: FAMILY MEDICINE | Facility: CLINIC | Age: 55
End: 2020-04-06

## 2020-04-06 RX ORDER — PAROXETINE 20 MG/1
20 TABLET, FILM COATED ORAL AT BEDTIME
Qty: 90 TABLET | Refills: 1 | Status: SHIPPED | OUTPATIENT
Start: 2020-04-06 | End: 2020-09-17

## 2020-04-06 RX ORDER — FENOFIBRATE 160 MG/1
TABLET ORAL
Qty: 90 TABLET | Refills: 1 | Status: SHIPPED | OUTPATIENT
Start: 2020-04-06 | End: 2020-08-31

## 2020-04-06 RX ORDER — OMEPRAZOLE 40 MG/1
CAPSULE, DELAYED RELEASE ORAL
Qty: 90 CAPSULE | Refills: 1 | Status: SHIPPED | OUTPATIENT
Start: 2020-04-06 | End: 2020-10-05

## 2020-04-06 RX ORDER — ATORVASTATIN CALCIUM 80 MG/1
80 TABLET, FILM COATED ORAL DAILY
Qty: 90 TABLET | Refills: 1 | Status: SHIPPED | OUTPATIENT
Start: 2020-04-06 | End: 2020-10-06

## 2020-04-06 RX ORDER — CLOTRIMAZOLE 1 %
CREAM (GRAM) TOPICAL 2 TIMES DAILY
Qty: 113 G | Refills: 5 | Status: SHIPPED | OUTPATIENT
Start: 2020-04-06 | End: 2021-05-18

## 2020-04-06 RX ORDER — NYSTATIN AND TRIAMCINOLONE ACETONIDE 100000; 1 [USP'U]/G; MG/G
CREAM TOPICAL 2 TIMES DAILY PRN
Qty: 60 G | Refills: 2 | Status: SHIPPED | OUTPATIENT
Start: 2020-04-06 | End: 2021-04-16

## 2020-04-06 RX ORDER — HYDROCHLOROTHIAZIDE 12.5 MG/1
12.5 CAPSULE ORAL DAILY
Qty: 90 CAPSULE | Refills: 1 | Status: SHIPPED | OUTPATIENT
Start: 2020-04-06 | End: 2020-06-16

## 2020-04-06 RX ORDER — LISINOPRIL 40 MG/1
40 TABLET ORAL DAILY
Qty: 90 TABLET | Refills: 1 | Status: SHIPPED | OUTPATIENT
Start: 2020-04-06 | End: 2020-06-16

## 2020-04-06 RX ORDER — NITROGLYCERIN 0.4 MG/1
TABLET SUBLINGUAL
Qty: 25 TABLET | Refills: 0 | Status: SHIPPED | OUTPATIENT
Start: 2020-04-06 | End: 2020-06-16

## 2020-04-06 RX ORDER — BLOOD SUGAR DIAGNOSTIC
1 STRIP MISCELLANEOUS DAILY
Qty: 100 STRIP | Refills: 11 | Status: SHIPPED | OUTPATIENT
Start: 2020-04-06 | End: 2020-08-28

## 2020-04-06 RX ORDER — DIAPER,BRIEF,INFANT-TODD,DISP
EACH MISCELLANEOUS 2 TIMES DAILY
Qty: 1 TUBE | Refills: 3 | Status: SHIPPED | OUTPATIENT
Start: 2020-04-06 | End: 2020-07-14

## 2020-04-06 ASSESSMENT — ANXIETY QUESTIONNAIRES
GAD7 TOTAL SCORE: 0
5. BEING SO RESTLESS THAT IT IS HARD TO SIT STILL: NOT AT ALL
GAD7 TOTAL SCORE: 0
7. FEELING AFRAID AS IF SOMETHING AWFUL MIGHT HAPPEN: NOT AT ALL
2. NOT BEING ABLE TO STOP OR CONTROL WORRYING: NOT AT ALL
7. FEELING AFRAID AS IF SOMETHING AWFUL MIGHT HAPPEN: NOT AT ALL
6. BECOMING EASILY ANNOYED OR IRRITABLE: NOT AT ALL
1. FEELING NERVOUS, ANXIOUS, OR ON EDGE: NOT AT ALL
4. TROUBLE RELAXING: NOT AT ALL
3. WORRYING TOO MUCH ABOUT DIFFERENT THINGS: NOT AT ALL

## 2020-04-06 ASSESSMENT — PATIENT HEALTH QUESTIONNAIRE - PHQ9
SUM OF ALL RESPONSES TO PHQ QUESTIONS 1-9: 0
SUM OF ALL RESPONSES TO PHQ QUESTIONS 1-9: 0
10. IF YOU CHECKED OFF ANY PROBLEMS, HOW DIFFICULT HAVE THESE PROBLEMS MADE IT FOR YOU TO DO YOUR WORK, TAKE CARE OF THINGS AT HOME, OR GET ALONG WITH OTHER PEOPLE: NOT DIFFICULT AT ALL

## 2020-04-06 NOTE — TELEPHONE ENCOUNTER
Prescription approved per [facility].  Noah Oropeza RN, BSN

## 2020-04-06 NOTE — TELEPHONE ENCOUNTER
Routing refill request to provider for review/approval because:  Drug not on the [facility] refill protocol   Noah Oropeza RN, BSN

## 2020-04-07 ASSESSMENT — PATIENT HEALTH QUESTIONNAIRE - PHQ9: SUM OF ALL RESPONSES TO PHQ QUESTIONS 1-9: 0

## 2020-04-07 ASSESSMENT — ANXIETY QUESTIONNAIRES: GAD7 TOTAL SCORE: 0

## 2020-04-08 ENCOUNTER — VIRTUAL VISIT (OUTPATIENT)
Dept: FAMILY MEDICINE | Facility: CLINIC | Age: 55
End: 2020-04-08
Payer: MEDICARE

## 2020-04-08 VITALS
SYSTOLIC BLOOD PRESSURE: 136 MMHG | TEMPERATURE: 96.6 F | BODY MASS INDEX: 27.63 KG/M2 | DIASTOLIC BLOOD PRESSURE: 70 MMHG | WEIGHT: 227 LBS

## 2020-04-08 DIAGNOSIS — I10 ESSENTIAL HYPERTENSION, BENIGN: ICD-10-CM

## 2020-04-08 DIAGNOSIS — F33.42 MAJOR DEPRESSIVE DISORDER, RECURRENT EPISODE, IN FULL REMISSION (H): ICD-10-CM

## 2020-04-08 DIAGNOSIS — I24.0 ISCHEMIC HEART DISEASE DUE TO CORONARY ARTERY OBSTRUCTION (H): ICD-10-CM

## 2020-04-08 DIAGNOSIS — K21.9 GASTROESOPHAGEAL REFLUX DISEASE WITHOUT ESOPHAGITIS: ICD-10-CM

## 2020-04-08 DIAGNOSIS — E78.5 HYPERLIPIDEMIA LDL GOAL <100: ICD-10-CM

## 2020-04-08 DIAGNOSIS — E11.8 CONTROLLED TYPE 2 DIABETES MELLITUS WITH COMPLICATION, WITHOUT LONG-TERM CURRENT USE OF INSULIN (H): Primary | ICD-10-CM

## 2020-04-08 DIAGNOSIS — I25.9 ISCHEMIC HEART DISEASE DUE TO CORONARY ARTERY OBSTRUCTION (H): ICD-10-CM

## 2020-04-08 PROCEDURE — 99214 OFFICE O/P EST MOD 30 MIN: CPT | Mod: GT | Performed by: FAMILY MEDICINE

## 2020-04-08 NOTE — PROGRESS NOTES
"Edd Fong is a 54 year old male who is being evaluated via a billable video visit.      The patient has been notified of following:     \"This video visit will be conducted via a call between you and your physician/provider. We have found that certain health care needs can be provided without the need for an in-person physical exam.  This service lets us provide the care you need with a video conversation.  If a prescription is necessary we can send it directly to your pharmacy.  If lab work is needed we can place an order for that and you can then stop by our lab to have the test done at a later time.    Video visits are billed at different rates depending on your insurance coverage.  Please reach out to your insurance provider with any questions.    If during the course of the call the physician/provider feels a video visit is not appropriate, you will not be charged for this service.\"    Patient has given verbal consent for Video visit? Yes    Patient would like the video invitation sent by: Send to e-mail at: [email or web address]        Subjective     Edd Fong is a 54 year old male who presents to clinic today for the following health issues:    History of Present Illness        Diabetes:   He presents for follow up of diabetes.  He is checking home blood glucose two times daily. He checks blood glucose before meals.  Blood glucose is never over 200 and never under 70. When his blood glucose is low, the patient is asymptomatic for confusion, blurred vision, lethargy and reports not feeling dizzy, shaky, or weak.  He has no concerns regarding his diabetes at this time.  He is not experiencing numbness or burning in feet, excessive thirst, blurry vision, weight changes or redness, sores or blisters on feet.         He eats 2-3 servings of fruits and vegetables daily.He consumes 1 sweetened beverage(s) daily.He exercises with enough effort to increase his heart rate 30 to 60 minutes per day.  He " exercises with enough effort to increase his heart rate 4 days per week.   He is taking medications regularly.   113,139,118,112, no lows or highs. COVID     96.6 temp, weight is 227#, at [facility] was 136/70.    Patient presents for evaluation of hypertension.  He indicates that he is feeling well and denies any symptoms referable to his elevated blood pressure. Specifically denies chest pain, palpitations, dyspnea, orthopnea, PND or peripheral edema. Current medication regimen is as listed below. Patient denies any side effects of medication.   taking lisinopril and hydrochlorothiazide and metoprolol. No side effects      Patient presents for a recheck of hyperlipidemia.  The patient is currently following a low fat diet plan and is following a regular exercise plan.      GERD taking prilosec, working well, no acid reflex    Taking paxil and doing well.     Lab Results   Component Value Date    A1C 6.5 01/14/2020    A1C 5.9 09/17/2019    A1C 7.2 03/13/2019    A1C 6.5 08/27/2018    A1C 6.6 03/19/2018             Video Start Time: 11:52    PROBLEMS TO ADD ON...    Recent Labs   Lab Test 01/14/20  0815 09/17/19  1052 03/13/19  1145  08/01/18  0803  08/17/17  1034   A1C 6.5* 5.9* 7.2*   < >  --    < >  --    LDL 75  --  101*  --  126*  --   --    HDL 36*  --  30*  --  31*  --   --    TRIG 108  --  131  --  168*  --   --    ALT 24 29 31   < >  --    < > 29   CR 1.02 1.03 1.08   < >  --    < > 1.08   GFRESTIMATED 82 81 77   < >  --    < > 71   GFRESTBLACK >90 >90 89   < >  --    < > 86   POTASSIUM 4.1 3.9 4.3   < >  --    < > 4.3   TSH  --  1.59  --   --   --   --  2.30    < > = values in this interval not displayed.      BP Readings from Last 3 Encounters:   04/08/20 136/70   01/14/20 138/80   09/17/19 (!) 150/78    Wt Readings from Last 3 Encounters:   04/08/20 103 kg (227 lb)   09/17/19 103 kg (227 lb)   06/03/19 102.7 kg (226 lb 8 oz)                    PROBLEMS TO ADD ON...  Reviewed and updated as needed this  "visit by Provider  Tobacco         Review of Systems   ROS COMP: Constitutional, HEENT, cardiovascular, pulmonary, gi and gu systems are negative, except as otherwise noted.      Objective    /70   Temp 96.6  F (35.9  C)   Wt 103 kg (227 lb)   BMI 27.63 kg/m    Estimated body mass index is 27.63 kg/m  as calculated from the following:    Height as of 9/17/19: 1.93 m (6' 4\").    Weight as of this encounter: 103 kg (227 lb).  Physical Exam vital per pt done at home  GENERAL: healthy, alert and no distress  NEURO: Normal  mentation intact and speech normal  PSYCH: mentation appears normal, affect normal/bright    Diagnostic Test Results:  Labs reviewed in Epic        Assessment & Plan     1. Controlled type 2 diabetes mellitus with complication, without long-term current use of insulin (H)  Well controlled, just got refills, labs done in Jan. Recheck labs and appt in 6 months    2. Essential hypertension, benign  Well controlled at home, cont same meds doing well, cont same    3. Gastroesophageal reflux disease without esophagitis  No sx, doing well, cont same    4. Ischemic heart disease due to coronary artery obstruction (H)  No CP, stable, on aspirin daily. If he gets any URI during this covid pandemic he is at high risk, gave him [facility] info and he can also call the clinic if during office hours    5. Major depressive disorder, recurrent episode, in full remission (H)  Well controlled, cont paxil    6. Hyperlipidemia LDL goal <100  Doing well on high dose lipitor and fenofibrate, cont same, labs in the fall       BMI:   Estimated body mass index is 27.63 kg/m  as calculated from the following:    Height as of 9/17/19: 1.93 m (6' 4\").    Weight as of 9/17/19: 103 kg (227 lb).   Weight management plan: Discussed healthy diet and exercise guidelines    Blood sugar testing frequency justification:  Daily checking, doing well       Regular exercise    Return in about 6 months (around 10/8/2020) for wellness " exam, diabetes check, fasting labs.    Janis Marcial MD  [facility]      Video-Visit Details    Type of service:  Video Visit    Video End Time (time video stopped): 12:14    Originating Location (pt. Location): Home with mom present    Distant Location (provider location):  [facility]     Mode of Communication:  Video Conference via [employer]    Return in about 6 months (around 10/8/2020) for wellness exam, diabetes check, fasting labs.       Janis Marcial MD

## 2020-06-11 ENCOUNTER — TELEPHONE (OUTPATIENT)
Dept: FAMILY MEDICINE | Facility: CLINIC | Age: 55
End: 2020-06-11

## 2020-06-11 DIAGNOSIS — E11.8 CONTROLLED TYPE 2 DIABETES MELLITUS WITH COMPLICATION, WITHOUT LONG-TERM CURRENT USE OF INSULIN (H): Primary | ICD-10-CM

## 2020-06-11 RX ORDER — LANCETS
EACH MISCELLANEOUS
Qty: 100 EACH | Refills: 10 | Status: SHIPPED | OUTPATIENT
Start: 2020-06-11 | End: 2021-09-14

## 2020-06-11 NOTE — TELEPHONE ENCOUNTER
Prescription approved per [facility] [place].    Kiera Ramirez RN on 6/11/2020 at 2:48 PM

## 2020-06-11 NOTE — TELEPHONE ENCOUNTER
Patient uses the Accu-chek fastclix lancets and needs a new prescription for these kind of lancets.  Patient has Medicare part B and so we need a very specific prescription with that name of lancet.      If approved, please send for Accu-chek Fastclix Lancets.    Thanks,  Yamilet Caballero CPhT  [facility]  [phone]

## 2020-06-26 ENCOUNTER — TRANSFERRED RECORDS (OUTPATIENT)
Dept: HEALTH INFORMATION MANAGEMENT | Facility: CLINIC | Age: 55
End: 2020-06-26

## 2020-06-26 LAB — RETINOPATHY: NEGATIVE

## 2020-07-12 DIAGNOSIS — L30.9 DERMATITIS: ICD-10-CM

## 2020-07-14 RX ORDER — DIAPER,BRIEF,INFANT-TODD,DISP
EACH MISCELLANEOUS 2 TIMES DAILY
Qty: 30 G | Refills: 1 | Status: SHIPPED | OUTPATIENT
Start: 2020-07-14 | End: 2020-08-30

## 2020-08-27 DIAGNOSIS — E11.8 CONTROLLED TYPE 2 DIABETES MELLITUS WITH COMPLICATION, WITHOUT LONG-TERM CURRENT USE OF INSULIN (H): ICD-10-CM

## 2020-08-27 DIAGNOSIS — L30.9 DERMATITIS: ICD-10-CM

## 2020-08-27 NOTE — TELEPHONE ENCOUNTER
Patient no longer want to fill with [employer] mail order.This prescription is not transferable, please send a new Rx. Thanks

## 2020-08-28 RX ORDER — BLOOD SUGAR DIAGNOSTIC
1 STRIP MISCELLANEOUS DAILY
Qty: 100 STRIP | Refills: 2 | Status: SHIPPED | OUTPATIENT
Start: 2020-08-28 | End: 2021-06-08

## 2020-08-28 NOTE — TELEPHONE ENCOUNTER
Remaining refills sent due to mail order pharmacy unable to transfer Rx. Pt's original order had refills through April 2021    Sophia Monique RN on 8/28/2020 at 1:31 PM

## 2020-08-28 NOTE — TELEPHONE ENCOUNTER
Pt called again to let us know to send the Rx to [facility] pharmacy and that he is all out and needs them as soon as possible.    Gail Werner Patient Representative

## 2020-08-29 DIAGNOSIS — E78.5 HYPERLIPIDEMIA LDL GOAL <100: ICD-10-CM

## 2020-08-30 RX ORDER — DIAPER,BRIEF,INFANT-TODD,DISP
EACH MISCELLANEOUS 2 TIMES DAILY
Qty: 1 TUBE | Refills: 3 | Status: SHIPPED | OUTPATIENT
Start: 2020-08-30 | End: 2020-12-01

## 2020-08-31 RX ORDER — FENOFIBRATE 160 MG/1
TABLET ORAL
Qty: 90 TABLET | Refills: 1 | Status: SHIPPED | OUTPATIENT
Start: 2020-08-31 | End: 2021-03-12

## 2020-08-31 NOTE — TELEPHONE ENCOUNTER
Prescription approved per [facility] Refill Protocol.    Javier Mantilla RN

## 2020-09-09 DIAGNOSIS — F33.42 MAJOR DEPRESSIVE DISORDER, RECURRENT EPISODE, IN FULL REMISSION (H): ICD-10-CM

## 2020-09-17 RX ORDER — PAROXETINE 20 MG/1
TABLET, FILM COATED ORAL
Qty: 90 TABLET | Refills: 0 | Status: SHIPPED | OUTPATIENT
Start: 2020-09-17 | End: 2020-12-01

## 2020-09-17 NOTE — TELEPHONE ENCOUNTER
Pt has appointment scheduled  Prescription approved per [facility] Refill Protocol.  Anna Wayne RN, BSN  Message handled by CLINIC NURSE.

## 2020-09-17 NOTE — TELEPHONE ENCOUNTER
General Call:     Who is calling:  patient    Reason for Call:  Calling to check on status of refill, pharmacy requested on 9/9/20.    What are your questions or concerns:  none    Date of last appointment with provider: 4/8/20 with Dr. Aggie Burch to leave a detailed message:No at Home number on file [phone] (home)

## 2020-10-04 DIAGNOSIS — K21.9 GASTROESOPHAGEAL REFLUX DISEASE WITHOUT ESOPHAGITIS: ICD-10-CM

## 2020-10-05 RX ORDER — OMEPRAZOLE 40 MG/1
CAPSULE, DELAYED RELEASE ORAL
Qty: 90 CAPSULE | Refills: 1 | Status: SHIPPED | OUTPATIENT
Start: 2020-10-05 | End: 2020-10-05

## 2020-10-05 NOTE — TELEPHONE ENCOUNTER
Prescription approved per [facility] Refill Protocol.    Javier Mantilla RN

## 2020-10-05 NOTE — PROGRESS NOTES
Pre-Visit Planning     Future Appointments   Date Time Provider Department Center   10/6/2020  9:15 AM Janis Marcial MD [facility]     Arrival Time for this Appointment:  8:50 AM   Appointment Notes for this encounter:   physical and diabetes   DM eye exam done 06/26/2020    Questionnaires Reviewed/Assigned  Additional questionnaires assigned    Last OV with provider  041/08/2020 SD  Assessment & Plan   1. Controlled type 2 diabetes mellitus with complication, without long-term current use of insulin (H)  Well controlled, just got refills, labs done in Jan. Recheck labs and appt in 6 months  2. Essential hypertension, benign  Well controlled at home, cont same meds doing well, cont same  3. Gastroesophageal reflux disease without esophagitis  No sx, doing well, cont same  4. Ischemic heart disease due to coronary artery obstruction (H)  No CP, stable, on aspirin daily. If he gets any URI during this covid pandemic he is at high risk, gave him [facility] info and he can also call the clinic if during office hours  5. Major depressive disorder, recurrent episode, in full remission (H)  Well controlled, cont paxil  6. Hyperlipidemia LDL goal <100  Doing well on high dose lipitor and fenofibrate, cont same, labs in the fall     Hospital ER Visits  none    Specialty Visits      Imaging and Lab Review    Recent Procedures    Health Maintenance Due   Topic Date Due     ANNUAL REVIEW OF HM ORDERS  1965     HEPATITIS B IMMUNIZATION (1 of 3 - Risk 3-dose series) 07/27/1982     MICROALBUMIN  03/13/2020     DIABETIC FOOT EXAM  03/13/2020     A1C  07/14/2020     LIPID  07/14/2020     INFLUENZA VACCINE (1) 09/01/2020     MEDICARE ANNUAL WELLNESS VISIT  09/17/2020     PHQ-9  10/04/2020     Current Outpatient Medications   Medication     aspirin 81 MG chewable tablet     atorvastatin (LIPITOR) 80 MG tablet     blood glucose (ONETOUCH ULTRA) test strip     blood glucose (ONETOUCH ULTRA) test strip     blood glucose  (ONETOUCH VERIO IQ) test strip     blood glucose monitoring (ACCU-CHEK FASTCLIX) lancets     blood glucose monitoring (NO BRAND SPECIFIED) meter device kit     blood glucose monitoring (ONE TOUCH ULTRASOFT) lancets     Blood Glucose Monitoring Suppl (BLOOD GLUCOSE METER) KIT     clotrimazole (LOTRIMIN) 1 % external cream     fenofibrate (TRIGLIDE/LOFIBRA) 160 MG tablet     hydrochlorothiazide (MICROZIDE) 12.5 MG capsule     hydrocortisone (CORTAID) 1 % external ointment     lisinopril (ZESTRIL) 40 MG tablet     metFORMIN (GLUCOPHAGE) 500 MG tablet     metoprolol succinate ER (TOPROL-XL) 100 MG 24 hr tablet     nitroGLYcerin (NITROSTAT) 0.4 MG sublingual tablet     nystatin-triamcinolone (MYCOLOG II) 956316-7.1 UNIT/GM-% external cream     omeprazole (PRILOSEC) 40 MG DR capsule     PARoxetine (PAXIL) 20 MG tablet     polyethylene glycol (MIRALAX/GLYCOLAX) powder     psyllium (METAMUCIL/KONSYL) 58.6 % powder     No current facility-administered medications for this visit.        Refills due?Omeprazole      Patient is active on MyChart.   Yes  Spoke to pt no new notes added.  Chyna Dugan RN    Patient preferred phone number: [phone]

## 2020-10-06 ENCOUNTER — PATIENT OUTREACH (OUTPATIENT)
Dept: FAMILY MEDICINE | Facility: CLINIC | Age: 55
End: 2020-10-06

## 2020-10-06 ENCOUNTER — OFFICE VISIT (OUTPATIENT)
Dept: FAMILY MEDICINE | Facility: CLINIC | Age: 55
End: 2020-10-06
Payer: MEDICARE

## 2020-10-06 VITALS
BODY MASS INDEX: 28.01 KG/M2 | TEMPERATURE: 97.8 F | RESPIRATION RATE: 16 BRPM | DIASTOLIC BLOOD PRESSURE: 70 MMHG | WEIGHT: 230 LBS | HEART RATE: 58 BPM | HEIGHT: 76 IN | SYSTOLIC BLOOD PRESSURE: 126 MMHG | OXYGEN SATURATION: 97 %

## 2020-10-06 DIAGNOSIS — E11.8 CONTROLLED TYPE 2 DIABETES MELLITUS WITH COMPLICATION, WITHOUT LONG-TERM CURRENT USE OF INSULIN (H): ICD-10-CM

## 2020-10-06 DIAGNOSIS — D64.9 ANEMIA, UNSPECIFIED TYPE: ICD-10-CM

## 2020-10-06 DIAGNOSIS — K21.9 GASTROESOPHAGEAL REFLUX DISEASE WITHOUT ESOPHAGITIS: ICD-10-CM

## 2020-10-06 DIAGNOSIS — I25.9 ISCHEMIC HEART DISEASE DUE TO CORONARY ARTERY OBSTRUCTION (H): ICD-10-CM

## 2020-10-06 DIAGNOSIS — E78.5 HYPERLIPIDEMIA LDL GOAL <100: ICD-10-CM

## 2020-10-06 DIAGNOSIS — D64.9 ANEMIA, UNSPECIFIED TYPE: Primary | ICD-10-CM

## 2020-10-06 DIAGNOSIS — I24.0 ISCHEMIC HEART DISEASE DUE TO CORONARY ARTERY OBSTRUCTION (H): ICD-10-CM

## 2020-10-06 DIAGNOSIS — Z12.5 ENCOUNTER FOR SCREENING FOR MALIGNANT NEOPLASM OF PROSTATE: ICD-10-CM

## 2020-10-06 DIAGNOSIS — Z00.00 ENCOUNTER FOR MEDICARE ANNUAL WELLNESS EXAM: Primary | ICD-10-CM

## 2020-10-06 DIAGNOSIS — F33.42 MAJOR DEPRESSIVE DISORDER, RECURRENT EPISODE, IN FULL REMISSION (H): ICD-10-CM

## 2020-10-06 DIAGNOSIS — E66.3 OVER WEIGHT: ICD-10-CM

## 2020-10-06 LAB
ERYTHROCYTE [DISTWIDTH] IN BLOOD BY AUTOMATED COUNT: 13.2 % (ref 10–15)
HBA1C MFR BLD: 7 % (ref 0–5.6)
HCT VFR BLD AUTO: 35.4 % (ref 40–53)
HGB BLD-MCNC: 11.7 G/DL (ref 13.3–17.7)
MCH RBC QN AUTO: 29.9 PG (ref 26.5–33)
MCHC RBC AUTO-ENTMCNC: 33.1 G/DL (ref 31.5–36.5)
MCV RBC AUTO: 91 FL (ref 78–100)
PLATELET # BLD AUTO: 180 10E9/L (ref 150–450)
RBC # BLD AUTO: 3.91 10E12/L (ref 4.4–5.9)
WBC # BLD AUTO: 4.7 10E9/L (ref 4–11)

## 2020-10-06 PROCEDURE — 84443 ASSAY THYROID STIM HORMONE: CPT | Performed by: FAMILY MEDICINE

## 2020-10-06 PROCEDURE — 83036 HEMOGLOBIN GLYCOSYLATED A1C: CPT | Performed by: FAMILY MEDICINE

## 2020-10-06 PROCEDURE — 99214 OFFICE O/P EST MOD 30 MIN: CPT | Mod: 25 | Performed by: FAMILY MEDICINE

## 2020-10-06 PROCEDURE — 36415 COLL VENOUS BLD VENIPUNCTURE: CPT | Performed by: FAMILY MEDICINE

## 2020-10-06 PROCEDURE — G0103 PSA SCREENING: HCPCS | Performed by: FAMILY MEDICINE

## 2020-10-06 PROCEDURE — 99396 PREV VISIT EST AGE 40-64: CPT | Mod: GZ | Performed by: FAMILY MEDICINE

## 2020-10-06 PROCEDURE — 99207 PR FOOT EXAM NO CHARGE: CPT | Performed by: FAMILY MEDICINE

## 2020-10-06 PROCEDURE — 82043 UR ALBUMIN QUANTITATIVE: CPT | Performed by: FAMILY MEDICINE

## 2020-10-06 PROCEDURE — 85027 COMPLETE CBC AUTOMATED: CPT | Performed by: FAMILY MEDICINE

## 2020-10-06 PROCEDURE — 80061 LIPID PANEL: CPT | Performed by: FAMILY MEDICINE

## 2020-10-06 PROCEDURE — 80053 COMPREHEN METABOLIC PANEL: CPT | Performed by: FAMILY MEDICINE

## 2020-10-06 RX ORDER — OMEPRAZOLE 40 MG/1
40 CAPSULE, DELAYED RELEASE ORAL DAILY
Qty: 90 CAPSULE | Refills: 1 | Status: SHIPPED | OUTPATIENT
Start: 2020-10-06 | End: 2021-03-04

## 2020-10-06 RX ORDER — ATORVASTATIN CALCIUM 80 MG/1
80 TABLET, FILM COATED ORAL DAILY
Qty: 90 TABLET | Refills: 3 | Status: SHIPPED | OUTPATIENT
Start: 2020-10-06 | End: 2021-11-26

## 2020-10-06 ASSESSMENT — ANXIETY QUESTIONNAIRES
4. TROUBLE RELAXING: NOT AT ALL
5. BEING SO RESTLESS THAT IT IS HARD TO SIT STILL: NOT AT ALL
2. NOT BEING ABLE TO STOP OR CONTROL WORRYING: NOT AT ALL
1. FEELING NERVOUS, ANXIOUS, OR ON EDGE: NOT AT ALL
6. BECOMING EASILY ANNOYED OR IRRITABLE: NOT AT ALL
GAD7 TOTAL SCORE: 0
7. FEELING AFRAID AS IF SOMETHING AWFUL MIGHT HAPPEN: NOT AT ALL
GAD7 TOTAL SCORE: 0
3. WORRYING TOO MUCH ABOUT DIFFERENT THINGS: NOT AT ALL
GAD7 TOTAL SCORE: 0
7. FEELING AFRAID AS IF SOMETHING AWFUL MIGHT HAPPEN: NOT AT ALL

## 2020-10-06 ASSESSMENT — PATIENT HEALTH QUESTIONNAIRE - PHQ9
SUM OF ALL RESPONSES TO PHQ QUESTIONS 1-9: 0
10. IF YOU CHECKED OFF ANY PROBLEMS, HOW DIFFICULT HAVE THESE PROBLEMS MADE IT FOR YOU TO DO YOUR WORK, TAKE CARE OF THINGS AT HOME, OR GET ALONG WITH OTHER PEOPLE: NOT DIFFICULT AT ALL
SUM OF ALL RESPONSES TO PHQ QUESTIONS 1-9: 0

## 2020-10-06 ASSESSMENT — ENCOUNTER SYMPTOMS
DIZZINESS: 0
PALPITATIONS: 0
HEMATOCHEZIA: 0
SORE THROAT: 0
ABDOMINAL PAIN: 0
COUGH: 0
NERVOUS/ANXIOUS: 0
HEMATURIA: 0
ARTHRALGIAS: 0
NAUSEA: 0
CONSTIPATION: 0
PARESTHESIAS: 0
CHILLS: 0
WEAKNESS: 0
MYALGIAS: 0
JOINT SWELLING: 0
HEARTBURN: 0
DIARRHEA: 0
SHORTNESS OF BREATH: 0
FEVER: 0
DYSURIA: 0
EYE PAIN: 0
FREQUENCY: 0
HEADACHES: 0

## 2020-10-06 ASSESSMENT — MIFFLIN-ST. JEOR: SCORE: 1969.77

## 2020-10-06 ASSESSMENT — ACTIVITIES OF DAILY LIVING (ADL): CURRENT_FUNCTION: NO ASSISTANCE NEEDED

## 2020-10-06 NOTE — TELEPHONE ENCOUNTER
Message left with female that answered home phone to have patient return call to this RN PAL, direct number provided (pcp RN PAL is out of office for the day)     HI Wilmer -   Your anemia is just slightly worse than last time. I recommend you go back to see [place] [facility] for another look.   Your a1c is also slightly higher. Please try to loose that 5-10# we are talking about and keep up the exercise and not snacking at night.     [facility], MD Ivon Reynolds, Registered Nurse, PAL (Patient Advocate Liason)   [facility]   [phone]

## 2020-10-06 NOTE — PATIENT INSTRUCTIONS
Patient Education   Personalized Prevention Plan  You are due for the preventive services outlined below.  Your care team is available to assist you in scheduling these services.  If you have already completed any of these items, please share that information with your care team to update in your medical record.  Health Maintenance Due   Topic Date Due     ANNUAL REVIEW OF HM ORDERS  1965     Hepatitis B Vaccine (1 of 3 - Risk 3-dose series) 07/27/1982     Kidney Microalbumin Urine Test  03/13/2020     Diabetic Foot Exam  03/13/2020     A1C Lab  07/14/2020     Cholesterol Lab  07/14/2020     Depression Assessment  10/04/2020

## 2020-10-06 NOTE — PROGRESS NOTES
"SUBJECTIVE:   CC: Edd Fong is an 55 year old male who presents for preventative health visit.       Patient has been advised of split billing requirements and indicates understanding: Yes  Healthy Habits:     In general, how would you rate your overall health?  Good    Frequency of exercise:  2-3 days/week    Duration of exercise:  30-45 minutes    Do you usually eat at least 4 servings of fruit and vegetables a day, include whole grains    & fiber and avoid regularly eating high fat or \"junk\" foods?  Yes    Taking medications regularly:  Yes    Medication side effects:  None    Ability to successfully perform activities of daily living:  No assistance needed    Home Safety:  No safety concerns identified    Hearing Impairment:  No hearing concerns    In the past 6 months, have you been bothered by leaking of urine?  No    In general, how would you rate your overall mental or emotional health?  Good      PHQ-2 Total Score: 0    Additional concerns today:  No          Diabetes Follow-up    How often are you checking your blood sugar? One time daily, 124 THIS am  What time of day are you checking your blood sugars (select all that apply)?  Before meals  Have you had any blood sugars above 200?  No  Have you had any blood sugars below 70?  No    What symptoms do you notice when your blood sugar is low?  None    What concerns do you have today about your diabetes? None     Do you have any of these symptoms? (Select all that apply)  No numbness or tingling in feet.  No redness, sores or blisters on feet.  No complaints of excessive thirst.  No reports of blurry vision.  No significant changes to weight.    EYE EXAM NORMAL. 6/20, [facility] and normal      Hyperlipidemia Follow-Up      Are you regularly taking any medication or supplement to lower your cholesterol?   Yes- lipitor 80, an fenofibrate    Are you having muscle aches or other side effects that you think could be caused by your cholesterol " lowering medication?  No    Hypertension Follow-up      Do you check your blood pressure regularly outside of the clinic? Yes  At [facility] and it was 130/70    Are you following a low salt diet? Yes    Are your blood pressures ever more than 140 on the top number (systolic) OR more   than 90 on the bottom number (diastolic), for example 140/90? No    BP Readings from Last 2 Encounters:   10/06/20 126/70   04/08/20 136/70     Hemoglobin A1C (%)   Date Value   01/14/2020 6.5 (H)   09/17/2019 5.9 (H)     LDL Cholesterol Calculated (mg/dL)   Date Value   01/14/2020 75   03/13/2019 101 (H)       Vascular Disease Follow-up      How often do you take nitroglycerin? Never        Do you take an aspirin every day? Yes  Cardiology appt in Feb, last years was a stress test and it was normal.  Depression Followup    How are you doing with your depression since your last visit? No change    Are you having other symptoms that might be associated with depression? No    Have you had a significant life event?  No     Are you feeling anxious or having panic attacks?   No    Do you have any concerns with your use of alcohol or other drugs? No    Social History     Tobacco Use     Smoking status: Never Smoker     Smokeless tobacco: Never Used   Substance Use Topics     Alcohol use: Yes     Alcohol/week: 0.0 standard drinks     Comment: 1 beer a day     Drug use: No     PHQ 9/17/2019 4/6/2020 10/6/2020   PHQ-9 Total Score 0 0 0   Q9: Thoughts of better off dead/self-harm past 2 weeks Not at all Not at all Not at all     MCKENNA-7 SCORE 9/17/2019 4/6/2020 10/6/2020   Total Score - - -   Total Score - 0 (minimal anxiety) 0 (minimal anxiety)   Total Score 0 0 0     Last PHQ-9 10/6/2020   1.  Little interest or pleasure in doing things 0   2.  Feeling down, depressed, or hopeless 0   3.  Trouble falling or staying asleep, or sleeping too much 0   4.  Feeling tired or having little energy 0   5.  Poor appetite or overeating 0   6.  Feeling bad about  yourself 0   7.  Trouble concentrating 0   8.  Moving slowly or restless 0   Q9: Thoughts of better off dead/self-harm past 2 weeks 0   PHQ-9 Total Score 0   Difficulty at work, home, or with people -         Suicide Assessment Five-step Evaluation and Treatment (SAFE-T)      Today's PHQ-2 Score:   PHQ-2 ( 1999 Pf[employer]) 10/6/2020   Q1: Little interest or pleasure in doing things 0   Q2: Feeling down, depressed or hopeless 0   PHQ-2 Score 0   Q1: Little interest or pleasure in doing things Not at all   Q2: Feeling down, depressed or hopeless Not at all   PHQ-2 Score 0       Abuse: Current or Past(Physical, Sexual or Emotional)- No  Do you feel safe in your environment? Yes        Social History     Tobacco Use     Smoking status: Never Smoker     Smokeless tobacco: Never Used   Substance Use Topics     Alcohol use: Yes     Alcohol/week: 0.0 standard drinks     Comment: 1 beer a day     If you drink alcohol do you typically have >3 drinks per day or >7 drinks per week? No    Alcohol Use 10/6/2020   Prescreen: >3 drinks/day or >7 drinks/week? No   Prescreen: >3 drinks/day or >7 drinks/week? -       Last PSA:   PSA   Date Value Ref Range Status   11/06/2018 0.26 0 - 4 ug/L Final     Comment:     Assay Method:  Chemiluminescence using Siemens Vista analyzer       Reviewed orders with patient. Reviewed health maintenance and updated orders accordingly - Yes  BP Readings from Last 3 Encounters:   10/06/20 126/70   04/08/20 136/70   01/14/20 138/80    Wt Readings from Last 3 Encounters:   10/06/20 104.3 kg (230 lb)   04/08/20 103 kg (227 lb)   09/17/19 103 kg (227 lb)                  Recent Labs   Lab Test 01/14/20  0815 09/17/19  1052 03/13/19  1145 08/01/18  0803 08/01/18  0803 08/17/17  1034 08/17/17  1034   A1C 6.5* 5.9* 7.2*   < >  --    < >  --    LDL 75  --  101*  --  126*  --   --    HDL 36*  --  30*  --  31*  --   --    TRIG 108  --  131  --  168*  --   --    ALT 24 29 31   < >  --    < > 29   CR 1.02 1.03 1.08   <  ">  --    < > 1.08   GFRESTIMATED 82 81 77   < >  --    < > 71   GFRESTBLACK >90 >90 89   < >  --    < > 86   POTASSIUM 4.1 3.9 4.3   < >  --    < > 4.3   TSH  --  1.59  --   --   --   --  2.30    < > = values in this interval not displayed.        Reviewed and updated as needed this visit by clinical staff  Tobacco  Allergies  Meds  Problems  Med Hx  Surg Hx  Fam Hx  Soc Hx          Reviewed and updated as needed this visit by Provider   Allergies  Meds  Problems            Flu was done 1 week ago, at [facility]    Review of Systems   Constitutional: Negative for chills and fever.   HENT: Negative for congestion, ear pain, hearing loss and sore throat.    Eyes: Negative for pain and visual disturbance.   Respiratory: Negative for cough and shortness of breath.    Cardiovascular: Negative for chest pain, palpitations and peripheral edema.   Gastrointestinal: Negative for abdominal pain, constipation, diarrhea, heartburn, hematochezia and nausea.   Genitourinary: Negative for discharge, dysuria, frequency, genital sores, hematuria, impotence and urgency.   Musculoskeletal: Negative for arthralgias, joint swelling and myalgias.   Skin: Negative for rash.   Neurological: Negative for dizziness, weakness, headaches and paresthesias.   Psychiatric/Behavioral: Negative for mood changes. The patient is not nervous/anxious.          OBJECTIVE:   /70 (BP Location: Right arm, Patient Position: Left side, Cuff Size: Adult Regular)   Pulse 58   Temp 97.8  F (36.6  C) (Oral)   Resp 16   Ht 1.93 m (6' 4\")   Wt 104.3 kg (230 lb)   SpO2 97%   BMI 28.00 kg/m      Physical Exam  GENERAL: healthy, alert and no distress  EYES: Eyes grossly normal to inspection,  and conjunctivae and sclerae normal  HENT: hearing aides noted  NECK: no adenopathy, no asymmetry, masses, or scars and thyroid normal to palpation  RESP: lungs clear to auscultation - no rales, rhonchi or wheezes  CV: regular rate and rhythm, normal S1 S2, " no S3 or S4, no murmur, click or rub, no peripheral edema and peripheral pulses strong  ABDOMEN: soft, nontender, no hepatosplenomegaly, no masses and bowel sounds normal  MS: no gross musculoskeletal defects noted, no edema    Diagnostic Test Results:  Labs reviewed in Epic    ASSESSMENT/PLAN:   1. Encounter for Medicare annual wellness exam  Doing well today, recommend weight loss 5-10#  - Lipid panel reflex to direct LDL Fasting  - [facility] ANNUAL WELLNESS VISIT, PPS, SUBSEQUENT  - Comprehensive metabolic panel  - CBC with platelets  - TSH with free T4 reflex  - PSA, screen    2. Controlled type 2 diabetes mellitus with complication, without long-term current use of insulin (H)  Doing well with diet and exercise, rechecking lab work, has been under good control  - HEMOGLOBIN A1C  - FOOT EXAM  - metFORMIN (GLUCOPHAGE) 500 MG tablet; TAKE 1 TABLET TWICE DAILY WITH MEALS  Dispense: 180 tablet; Refill: 1  - OFFICE/OUTPT VISIT,EST,LEVL III    3. Ischemic heart disease due to coronary artery obstruction (H)  Stable, cont seeing cardiology  - Albumin Random Urine Quantitative with Creat Ratio  - OFFICE/OUTPT VISIT,EST,LEVL III    4. Major depressive disorder, recurrent episode, in full remission (H)  Well controlled, cont paxil  - OFFICE/OUTPT VISIT,EST,LEVL III    5. Hyperlipidemia LDL goal <100  Doing well on high dose lipitor and febofibrate  - Lipid panel reflex to direct LDL Fasting  - atorvastatin (LIPITOR) 80 MG tablet; Take 1 tablet (80 mg) by mouth daily  Dispense: 90 tablet; Refill: 3  - OFFICE/OUTPT VISIT,EST,LEVL III    6. Gastroesophageal reflux disease without esophagitis  Refilled, stable  - omeprazole (PRILOSEC) 40 MG DR capsule; Take 1 capsule (40 mg) by mouth daily  Dispense: 90 capsule; Refill: 1  - OFFICE/OUTPT VISIT,EST,LEVL III    7. Over weight  Avoid snacking and rid bike daily  - OFFICE/OUTPT VISIT,EST,LEVL III    8. Encounter for screening for malignant neoplasm of prostate   No sx  - PSA, screen  -  "OFFICE/OUTPT VISIT,ESTLEVL III    9. Anemia, unspecified type  Mild, seen by [facility] in the past, told to follow up if this continues, hgb decreasing from last time      Patient has been advised of split billing requirements and indicates understanding: Yes  COUNSELING:   Reviewed preventive health counseling, as reflected in patient instructions       Regular exercise       Healthy diet/nutrition    Estimated body mass index is 28 kg/m  as calculated from the following:    Height as of this encounter: 1.93 m (6' 4\").    Weight as of this encounter: 104.3 kg (230 lb).     Weight management plan: Discussed healthy diet and exercise guidelines    He reports that he has never smoked. He has never used smokeless tobacco.      Counseling Resources:  ATP IV Guidelines  Pooled Cohorts Equation Calculator  FRAX Risk Assessment  ICSI Preventive Guidelines  Dietary Guidelines for Americans, 2010  USDA's MyPlate  ASA Prophylaxis  Lung CA Screening    Janis Marcial MD  [facility]  Answers for HPI/ROS submitted by the patient on 10/6/2020   Annual Exam:  If you checked off any problems, how difficult have these problems made it for you to do your work, take care of things at home, or get along with other people?: Not difficult at all  PHQ9 TOTAL SCORE: 0  MCKENNA 7 TOTAL SCORE: 0    "

## 2020-10-07 LAB
ALBUMIN SERPL-MCNC: 4 G/DL (ref 3.4–5)
ALP SERPL-CCNC: 54 U/L (ref 40–150)
ALT SERPL W P-5'-P-CCNC: 28 U/L (ref 0–70)
ANION GAP SERPL CALCULATED.3IONS-SCNC: 9 MMOL/L (ref 3–14)
AST SERPL W P-5'-P-CCNC: 16 U/L (ref 0–45)
BILIRUB SERPL-MCNC: 0.5 MG/DL (ref 0.2–1.3)
BUN SERPL-MCNC: 23 MG/DL (ref 7–30)
CALCIUM SERPL-MCNC: 9.1 MG/DL (ref 8.5–10.1)
CHLORIDE SERPL-SCNC: 108 MMOL/L (ref 94–109)
CHOLEST SERPL-MCNC: 164 MG/DL
CO2 SERPL-SCNC: 23 MMOL/L (ref 20–32)
CREAT SERPL-MCNC: 1.19 MG/DL (ref 0.66–1.25)
GFR SERPL CREATININE-BSD FRML MDRD: 67 ML/MIN/{1.73_M2}
GLUCOSE SERPL-MCNC: 116 MG/DL (ref 70–99)
HDLC SERPL-MCNC: 38 MG/DL
LDLC SERPL CALC-MCNC: 88 MG/DL
NONHDLC SERPL-MCNC: 126 MG/DL
POTASSIUM SERPL-SCNC: 4.1 MMOL/L (ref 3.4–5.3)
PROT SERPL-MCNC: 6.8 G/DL (ref 6.8–8.8)
PSA SERPL-ACNC: 0.29 UG/L (ref 0–4)
SODIUM SERPL-SCNC: 140 MMOL/L (ref 133–144)
TRIGL SERPL-MCNC: 190 MG/DL
TSH SERPL DL<=0.005 MIU/L-ACNC: 2.27 MU/L (ref 0.4–4)

## 2020-10-07 ASSESSMENT — ANXIETY QUESTIONNAIRES: GAD7 TOTAL SCORE: 0

## 2020-10-07 ASSESSMENT — PATIENT HEALTH QUESTIONNAIRE - PHQ9: SUM OF ALL RESPONSES TO PHQ QUESTIONS 1-9: 0

## 2020-10-07 NOTE — TELEPHONE ENCOUNTER
This RN attempted to contact patient to discuss message below as RN SONYA is out of the office for the rest of the afternoon    RN called twice, both times phone was picked up and hung up     RN PAL can try to call patient next business day     Ivon Reynolds Registered Nurse, PAL (Patient Advocate Liason)   [facility]   [phone]

## 2020-10-07 NOTE — TELEPHONE ENCOUNTER
Pt called back he never saw hematology/onc.  He would like a new referral would like to go to [place].  Chyna Dugan RN

## 2020-10-07 NOTE — TELEPHONE ENCOUNTER
He did see them 10/18,please see chart, [facility] and they discussed his mild anemia.  Please pull up notes and read to patient if needed. He can get confused about things, so this is why I have assigned him a pal.  I did place the new referral to back to [facility] in [place], please let pt know.    Thank you!

## 2020-10-07 NOTE — TELEPHONE ENCOUNTER
LM on  to call this RN back if he should call the main clinic please forward to me [phone]  Chyna Dugan RN

## 2020-10-07 NOTE — TELEPHONE ENCOUNTER
Spoke to patient. He now does remember going to [facility].     He will call them to make an appointment. Advised that referral is placed and ready.     Patient expressed understanding and acceptance of the plan and had no further questions at this time. Advised to call back if worsening symptoms or no improvement noted.     Carina Barba RN Flex

## 2020-10-08 LAB
CREAT UR-MCNC: 184 MG/DL
MICROALBUMIN UR-MCNC: 10 MG/L
MICROALBUMIN/CREAT UR: 5.49 MG/G CR (ref 0–17)

## 2020-11-30 DIAGNOSIS — L30.9 DERMATITIS: ICD-10-CM

## 2020-11-30 NOTE — TELEPHONE ENCOUNTER
Routing refill request to provider for review/approval because:  Drug not on the [facility] refill protocol     Dulce Maria Rogers RN   [facility] -- Triage Nurse

## 2020-12-01 RX ORDER — DIAPER,BRIEF,INFANT-TODD,DISP
EACH MISCELLANEOUS
Qty: 56 G | Refills: 1 | Status: SHIPPED | OUTPATIENT
Start: 2020-12-01 | End: 2021-04-26

## 2020-12-28 ENCOUNTER — MYC REFILL (OUTPATIENT)
Dept: FAMILY MEDICINE | Facility: CLINIC | Age: 55
End: 2020-12-28

## 2020-12-28 DIAGNOSIS — I10 HTN, GOAL BELOW 140/90: ICD-10-CM

## 2020-12-30 RX ORDER — HYDROCHLOROTHIAZIDE 12.5 MG/1
CAPSULE ORAL
Qty: 90 CAPSULE | Refills: 0 | OUTPATIENT
Start: 2020-12-30

## 2021-03-01 ENCOUNTER — MYC REFILL (OUTPATIENT)
Dept: FAMILY MEDICINE | Facility: CLINIC | Age: 56
End: 2021-03-01

## 2021-03-01 DIAGNOSIS — I10 ESSENTIAL HYPERTENSION, BENIGN: ICD-10-CM

## 2021-03-01 DIAGNOSIS — I10 HTN, GOAL BELOW 140/90: ICD-10-CM

## 2021-03-02 ENCOUNTER — MYC REFILL (OUTPATIENT)
Dept: FAMILY MEDICINE | Facility: CLINIC | Age: 56
End: 2021-03-02

## 2021-03-02 DIAGNOSIS — I25.9 ISCHEMIC HEART DISEASE DUE TO CORONARY ARTERY OBSTRUCTION (H): ICD-10-CM

## 2021-03-02 DIAGNOSIS — I24.0 ISCHEMIC HEART DISEASE DUE TO CORONARY ARTERY OBSTRUCTION (H): ICD-10-CM

## 2021-03-02 RX ORDER — HYDROCHLOROTHIAZIDE 12.5 MG/1
CAPSULE ORAL
Qty: 90 CAPSULE | Refills: 1 | Status: SHIPPED | OUTPATIENT
Start: 2021-03-02 | End: 2021-09-14

## 2021-03-02 RX ORDER — METOPROLOL SUCCINATE 100 MG/1
TABLET, EXTENDED RELEASE ORAL
Qty: 90 TABLET | Refills: 1 | Status: SHIPPED | OUTPATIENT
Start: 2021-03-02 | End: 2021-09-01

## 2021-03-02 NOTE — TELEPHONE ENCOUNTER
Prescription approved per [facility] Refill Protocol.  Yissel Mcdonald RN

## 2021-03-04 RX ORDER — NITROGLYCERIN 0.4 MG/1
TABLET SUBLINGUAL
Qty: 25 TABLET | Refills: 0 | Status: SHIPPED | OUTPATIENT
Start: 2021-03-04

## 2021-03-12 DIAGNOSIS — E78.5 HYPERLIPIDEMIA LDL GOAL <100: ICD-10-CM

## 2021-03-12 RX ORDER — FENOFIBRATE 160 MG/1
TABLET ORAL
Qty: 90 TABLET | Refills: 1 | Status: SHIPPED | OUTPATIENT
Start: 2021-03-12 | End: 2021-08-02

## 2021-03-12 NOTE — TELEPHONE ENCOUNTER
Prescription approved per [facility] Refill Protocol.  Yissel Mcdonald RN

## 2021-03-24 ENCOUNTER — IMMUNIZATION (OUTPATIENT)
Dept: NURSING | Facility: CLINIC | Age: 56
End: 2021-03-24
Payer: MEDICARE

## 2021-03-24 PROCEDURE — 0001A PR COVID VAC PFIZER DIL RECON 30 MCG/0.3 ML IM: CPT

## 2021-03-24 PROCEDURE — 91300 PR COVID VAC PFIZER DIL RECON 30 MCG/0.3 ML IM: CPT

## 2021-04-06 ENCOUNTER — OFFICE VISIT (OUTPATIENT)
Dept: FAMILY MEDICINE | Facility: CLINIC | Age: 56
End: 2021-04-06
Payer: MEDICARE

## 2021-04-06 VITALS
TEMPERATURE: 97.5 F | RESPIRATION RATE: 16 BRPM | OXYGEN SATURATION: 98 % | WEIGHT: 231 LBS | HEART RATE: 58 BPM | HEIGHT: 75 IN | DIASTOLIC BLOOD PRESSURE: 74 MMHG | BODY MASS INDEX: 28.72 KG/M2 | SYSTOLIC BLOOD PRESSURE: 134 MMHG

## 2021-04-06 DIAGNOSIS — E78.5 HYPERLIPIDEMIA LDL GOAL <100: ICD-10-CM

## 2021-04-06 DIAGNOSIS — I10 ESSENTIAL HYPERTENSION, BENIGN: ICD-10-CM

## 2021-04-06 DIAGNOSIS — I24.0 ISCHEMIC HEART DISEASE DUE TO CORONARY ARTERY OBSTRUCTION (H): ICD-10-CM

## 2021-04-06 DIAGNOSIS — K21.9 GASTROESOPHAGEAL REFLUX DISEASE WITHOUT ESOPHAGITIS: ICD-10-CM

## 2021-04-06 DIAGNOSIS — I10 HTN, GOAL BELOW 140/90: ICD-10-CM

## 2021-04-06 DIAGNOSIS — G47.33 OSA (OBSTRUCTIVE SLEEP APNEA): ICD-10-CM

## 2021-04-06 DIAGNOSIS — I25.9 ISCHEMIC HEART DISEASE DUE TO CORONARY ARTERY OBSTRUCTION (H): ICD-10-CM

## 2021-04-06 DIAGNOSIS — F33.42 MAJOR DEPRESSIVE DISORDER, RECURRENT EPISODE, IN FULL REMISSION (H): ICD-10-CM

## 2021-04-06 DIAGNOSIS — E11.8 CONTROLLED TYPE 2 DIABETES MELLITUS WITH COMPLICATION, WITHOUT LONG-TERM CURRENT USE OF INSULIN (H): Primary | ICD-10-CM

## 2021-04-06 LAB
ALBUMIN SERPL-MCNC: 4.1 G/DL (ref 3.4–5)
ALP SERPL-CCNC: 58 U/L (ref 40–150)
ALT SERPL W P-5'-P-CCNC: 37 U/L (ref 0–70)
ANION GAP SERPL CALCULATED.3IONS-SCNC: 3 MMOL/L (ref 3–14)
AST SERPL W P-5'-P-CCNC: 25 U/L (ref 0–45)
BILIRUB SERPL-MCNC: 0.5 MG/DL (ref 0.2–1.3)
BUN SERPL-MCNC: 30 MG/DL (ref 7–30)
CALCIUM SERPL-MCNC: 9.5 MG/DL (ref 8.5–10.1)
CHLORIDE SERPL-SCNC: 108 MMOL/L (ref 94–109)
CHOLEST SERPL-MCNC: 186 MG/DL
CO2 SERPL-SCNC: 29 MMOL/L (ref 20–32)
CREAT SERPL-MCNC: 1.26 MG/DL (ref 0.66–1.25)
ERYTHROCYTE [DISTWIDTH] IN BLOOD BY AUTOMATED COUNT: 12.8 % (ref 10–15)
GFR SERPL CREATININE-BSD FRML MDRD: 63 ML/MIN/{1.73_M2}
GLUCOSE SERPL-MCNC: 143 MG/DL (ref 70–99)
HBA1C MFR BLD: 7.2 % (ref 0–5.6)
HCT VFR BLD AUTO: 36.9 % (ref 40–53)
HDLC SERPL-MCNC: 37 MG/DL
HGB BLD-MCNC: 12.1 G/DL (ref 13.3–17.7)
LDLC SERPL CALC-MCNC: 113 MG/DL
MCH RBC QN AUTO: 30 PG (ref 26.5–33)
MCHC RBC AUTO-ENTMCNC: 32.8 G/DL (ref 31.5–36.5)
MCV RBC AUTO: 92 FL (ref 78–100)
NONHDLC SERPL-MCNC: 149 MG/DL
PLATELET # BLD AUTO: 179 10E9/L (ref 150–450)
POTASSIUM SERPL-SCNC: 4.5 MMOL/L (ref 3.4–5.3)
PROT SERPL-MCNC: 6.7 G/DL (ref 6.8–8.8)
RBC # BLD AUTO: 4.03 10E12/L (ref 4.4–5.9)
SODIUM SERPL-SCNC: 140 MMOL/L (ref 133–144)
TRIGL SERPL-MCNC: 179 MG/DL
WBC # BLD AUTO: 4.5 10E9/L (ref 4–11)

## 2021-04-06 PROCEDURE — 80061 LIPID PANEL: CPT | Performed by: FAMILY MEDICINE

## 2021-04-06 PROCEDURE — 36415 COLL VENOUS BLD VENIPUNCTURE: CPT | Performed by: FAMILY MEDICINE

## 2021-04-06 PROCEDURE — 83036 HEMOGLOBIN GLYCOSYLATED A1C: CPT | Performed by: FAMILY MEDICINE

## 2021-04-06 PROCEDURE — 85027 COMPLETE CBC AUTOMATED: CPT | Performed by: FAMILY MEDICINE

## 2021-04-06 PROCEDURE — 80053 COMPREHEN METABOLIC PANEL: CPT | Performed by: FAMILY MEDICINE

## 2021-04-06 PROCEDURE — 99214 OFFICE O/P EST MOD 30 MIN: CPT | Performed by: FAMILY MEDICINE

## 2021-04-06 RX ORDER — LISINOPRIL 40 MG/1
40 TABLET ORAL DAILY
Qty: 90 TABLET | Refills: 1 | Status: SHIPPED | OUTPATIENT
Start: 2021-04-06 | End: 2021-09-23

## 2021-04-06 RX ORDER — OMEPRAZOLE 40 MG/1
CAPSULE, DELAYED RELEASE ORAL
Qty: 90 CAPSULE | Refills: 0 | Status: SHIPPED | OUTPATIENT
Start: 2021-04-06 | End: 2021-06-24

## 2021-04-06 RX ORDER — PAROXETINE 20 MG/1
20 TABLET, FILM COATED ORAL AT BEDTIME
Qty: 90 TABLET | Refills: 1 | Status: SHIPPED | OUTPATIENT
Start: 2021-04-06 | End: 2021-10-06

## 2021-04-06 ASSESSMENT — MIFFLIN-ST. JEOR: SCORE: 1962.4

## 2021-04-06 ASSESSMENT — ANXIETY QUESTIONNAIRES
2. NOT BEING ABLE TO STOP OR CONTROL WORRYING: NOT AT ALL
6. BECOMING EASILY ANNOYED OR IRRITABLE: NOT AT ALL
IF YOU CHECKED OFF ANY PROBLEMS ON THIS QUESTIONNAIRE, HOW DIFFICULT HAVE THESE PROBLEMS MADE IT FOR YOU TO DO YOUR WORK, TAKE CARE OF THINGS AT HOME, OR GET ALONG WITH OTHER PEOPLE: NOT DIFFICULT AT ALL
7. FEELING AFRAID AS IF SOMETHING AWFUL MIGHT HAPPEN: NOT AT ALL
3. WORRYING TOO MUCH ABOUT DIFFERENT THINGS: NOT AT ALL
GAD7 TOTAL SCORE: 0
5. BEING SO RESTLESS THAT IT IS HARD TO SIT STILL: NOT AT ALL
1. FEELING NERVOUS, ANXIOUS, OR ON EDGE: NOT AT ALL

## 2021-04-06 ASSESSMENT — PATIENT HEALTH QUESTIONNAIRE - PHQ9
5. POOR APPETITE OR OVEREATING: NOT AT ALL
SUM OF ALL RESPONSES TO PHQ QUESTIONS 1-9: 0

## 2021-04-06 NOTE — PROGRESS NOTES
"    Assessment & Plan     Controlled type 2 diabetes mellitus with complication, without long-term current use of insulin (H)  Due for labs, cont same for now, weight is up some, recommend getting more exercise  - HEMOGLOBIN A1C  - Lipid panel reflex to direct LDL Fasting  - metFORMIN (GLUCOPHAGE) 500 MG tablet; TAKE 1 TABLET TWICE DAILY WITH MEALS  - Comprehensive metabolic panel  - CBC with platelets    Essential hypertension, benign  Controlled, cont same    Gastroesophageal reflux disease without esophagitis  Stable, cont same  - omeprazole (PRILOSEC) 40 MG DR capsule; TAKE 1 CAPSULE EVERY DAY    Hyperlipidemia LDL goal <100  Due for labs,no side effects   - Lipid panel reflex to direct LDL Fasting    Ischemic heart disease due to coronary artery obstruction (H)  stable    Major depressive disorder, recurrent episode, in full remission (H)  Well controlled  - PARoxetine (PAXIL) 20 MG tablet; Take 1 tablet (20 mg) by mouth At Bedtime    TERESSA (obstructive sleep apnea)  Using mask, stable    HTN, goal below 140/90  controlled  - lisinopril (ZESTRIL) 40 MG tablet; Take 1 tablet (40 mg) by mouth daily             BMI:   Estimated body mass index is 28.68 kg/m  as calculated from the following:    Height as of this encounter: 1.911 m (6' 3.25\").    Weight as of this encounter: 104.8 kg (231 lb).   Weight management plan: Discussed healthy diet and exercise guidelines    Work on weight loss  Regular exercise    Return in about 6 months (around 10/6/2021) for Preventive Visit, Diabetes check, In-Clinic Visit.    Janis Marcial MD  [facility] GLEN Guillen is a 55 year old who presents for the following health issues      HPI     Diabetes Follow-up    How often are you checking your blood sugar? One time daily  What time of day are you checking your blood sugars (select all that apply)?  Before meals  Have you had any blood sugars above 200?  No  140-150, 132 one morning, rare for it " to be over 200, once in the past 6 months  Have you had any blood sugars below 70?  No    What symptoms do you notice when your blood sugar is low?  None    What concerns do you have today about your diabetes? None     Do you have any of these symptoms? (Select all that apply)  No numbness or tingling in feet.  No redness, sores or blisters on feet.  No complaints of excessive thirst.  No reports of blurry vision.  No significant changes to weight.    Exercise has dropped off due to covid, did shovel snow this winter.    Got his first covid vaccine pfizer, 2nd shot is next week. Asking about about masks.    Sleep apnea is stable, sleeping well, good energy, using cpap    Anemia and seeing specialists, but was told it was not a problem.    Hyperlipidemia Follow-Up      Are you regularly taking any medication or supplement to lower your cholesterol?   Yes- Atorvastatin    Are you having muscle aches or other side effects that you think could be caused by your cholesterol lowering medication?  No    Hypertension Follow-up      Do you check your blood pressure regularly outside of the clinic? No     Are you following a low salt diet? Yes     Up today, taking meds daily      BP Readings from Last 2 Encounters:   04/06/21 134/74   10/06/20 126/70     Hemoglobin A1C (%)   Date Value   04/06/2021 7.2 (H)   10/06/2020 7.0 (H)     LDL Cholesterol Calculated (mg/dL)   Date Value   04/06/2021 113 (H)   10/06/2020 88         How many servings of fruits and vegetables do you eat daily?  2-3    On average, how many sweetened beverages do you drink each day (Examples: soda, juice, sweet tea, etc.  Do NOT count diet or artificially sweetened beverages)?   1    How many days per week do you exercise enough to make your heart beat faster? 3 or less    How many minutes a day do you exercise enough to make your heart beat faster? 30 - 60    How many days per week do you miss taking your medication? 0        Review of Systems  "  CONSTITUTIONAL: NEGATIVE for fever, chills, change in weight  ENT/MOUTH: NEGATIVE for ear, mouth and throat problems  RESP: NEGATIVE for significant cough or SOB  CV: NEGATIVE for chest pain, palpitations or peripheral edema      Objective    /74   Pulse 58   Temp 97.5  F (36.4  C) (Oral)   Resp 16   Ht 1.911 m (6' 3.25\")   Wt 104.8 kg (231 lb)   SpO2 98%   BMI 28.68 kg/m    Body mass index is 28.68 kg/m .  Physical Exam   GENERAL: healthy, alert and no distress  EYES: Eyes grossly normal to inspection,  and conjunctivae and sclerae normal  HENT: ear canals and TM's normal, nose and mouth without ulcers or lesions  NECK: no adenopathy, no asymmetry, masses, or scars and thyroid normal to palpation  RESP: lungs clear to auscultation - no rales, rhonchi or wheezes  CV: regular rate and rhythm, normal S1 S2, no S3 or S4, no murmur, click or rub, no peripheral edema and peripheral pulses strong  ABDOMEN: soft, nontender, no hepatosplenomegaly, no masses and bowel sounds normal  MS: no gross musculoskeletal defects noted, no edema  SKIN: no suspicious lesions or rashes  NEURO: Normal strength and tone, mentation intact and speech normal  PSYCH: mentation appears normal, affect normal/bright  Diabetic foot exam: normal DP and PT pulses, no trophic changes or ulcerative lesions, normal sensory exam and normal monofilament exam                "

## 2021-04-07 ASSESSMENT — ANXIETY QUESTIONNAIRES: GAD7 TOTAL SCORE: 0

## 2021-04-14 ENCOUNTER — OFFICE VISIT (OUTPATIENT)
Dept: NURSING | Facility: CLINIC | Age: 56
End: 2021-04-14
Attending: INTERNAL MEDICINE
Payer: MEDICARE

## 2021-04-14 PROCEDURE — 91300 PR COVID VAC PFIZER DIL RECON 30 MCG/0.3 ML IM: CPT

## 2021-04-14 PROCEDURE — 0002A PR COVID VAC PFIZER DIL RECON 30 MCG/0.3 ML IM: CPT

## 2021-04-15 DIAGNOSIS — B35.6 TINEA CRURIS: ICD-10-CM

## 2021-04-15 NOTE — TELEPHONE ENCOUNTER
nystatin-triamcinolone (MYCOLOG II) 411694-5.1 UNIT/GM-% external cream      Last Written Prescription Date:  4/6/2020  Last Fill Quantity: 60g,   # refills: 2  Last Office Visit: 4/6/2021  Future Office visit:       Routing refill request to provider for review/approval because:  Drug not on the [facility] or [facility] refill protocol or controlled substance.    Yissel Mcdonald RN

## 2021-04-16 RX ORDER — NYSTATIN AND TRIAMCINOLONE ACETONIDE 100000; 1 [USP'U]/G; MG/G
CREAM TOPICAL
Qty: 60 G | Refills: 2 | Status: SHIPPED | OUTPATIENT
Start: 2021-04-16

## 2021-04-25 DIAGNOSIS — L30.9 DERMATITIS: ICD-10-CM

## 2021-04-26 RX ORDER — DIAPER,BRIEF,INFANT-TODD,DISP
EACH MISCELLANEOUS
Qty: 56 G | Refills: 4 | Status: SHIPPED | OUTPATIENT
Start: 2021-04-26 | End: 2021-08-18

## 2021-04-26 NOTE — TELEPHONE ENCOUNTER
hydrocortisone (CORTAID) 1 % external ointment      Last Written Prescription Date:  12/1/2020  Last Fill Quantity: 56g,   # refills: 1  Last Office Visit: 4/6/2021  Future Office visit:       Routing refill request to provider for review/approval because:  Drug not on the [facility], [facility] or [facility] refill protocol or controlled substance.    Yissel Mcdonald RN

## 2021-05-06 DIAGNOSIS — I10 HTN, GOAL BELOW 140/90: ICD-10-CM

## 2021-05-06 RX ORDER — LISINOPRIL 40 MG/1
40 TABLET ORAL DAILY
Qty: 90 TABLET | Refills: 1
Start: 2021-05-06

## 2021-05-15 DIAGNOSIS — B35.3 TINEA PEDIS OF BOTH FEET: ICD-10-CM

## 2021-05-17 NOTE — TELEPHONE ENCOUNTER
clotrimazole (LOTRIMIN) 1 % external cream  Last Written Prescription Date:  4/6/20  Last Fill Quantity: 113g ,   # refills: 5  Last Office Visit: 4/6/21  Future Office visit:       Routing refill request to provider for review/approval because:  Drug not on the [facility], [facility] or [facility] refill protocol or controlled substance    Tanja Salcedo RN on 5/17/2021 at 11:46 AM

## 2021-05-18 RX ORDER — CLOTRIMAZOLE 1 %
CREAM (GRAM) TOPICAL 2 TIMES DAILY
Qty: 90 G | Refills: 1 | Status: SHIPPED | OUTPATIENT
Start: 2021-05-18 | End: 2021-10-12

## 2021-06-24 DIAGNOSIS — K21.9 GASTROESOPHAGEAL REFLUX DISEASE WITHOUT ESOPHAGITIS: ICD-10-CM

## 2021-06-24 DIAGNOSIS — I10 ESSENTIAL HYPERTENSION, BENIGN: ICD-10-CM

## 2021-06-24 RX ORDER — OMEPRAZOLE 40 MG/1
CAPSULE, DELAYED RELEASE ORAL
Qty: 90 CAPSULE | Refills: 2 | Status: SHIPPED | OUTPATIENT
Start: 2021-06-24 | End: 2022-02-23

## 2021-06-24 RX ORDER — METOPROLOL SUCCINATE 100 MG/1
TABLET, EXTENDED RELEASE ORAL
Qty: 90 TABLET | Refills: 1 | OUTPATIENT
Start: 2021-06-24

## 2021-06-30 ENCOUNTER — TRANSFERRED RECORDS (OUTPATIENT)
Dept: HEALTH INFORMATION MANAGEMENT | Facility: CLINIC | Age: 56
End: 2021-06-30

## 2021-06-30 LAB — RETINOPATHY: NEGATIVE

## 2021-07-22 DIAGNOSIS — E78.5 HYPERLIPIDEMIA LDL GOAL <100: ICD-10-CM

## 2021-07-22 RX ORDER — ATORVASTATIN CALCIUM 80 MG/1
TABLET, FILM COATED ORAL
Qty: 90 TABLET | Refills: 3
Start: 2021-07-22

## 2021-08-02 DIAGNOSIS — E78.5 HYPERLIPIDEMIA LDL GOAL <100: ICD-10-CM

## 2021-08-02 RX ORDER — FENOFIBRATE 160 MG/1
TABLET ORAL
Qty: 90 TABLET | Refills: 1 | Status: SHIPPED | OUTPATIENT
Start: 2021-08-02 | End: 2022-03-09

## 2021-08-18 DIAGNOSIS — L30.9 DERMATITIS: ICD-10-CM

## 2021-08-18 RX ORDER — DIAPER,BRIEF,INFANT-TODD,DISP
EACH MISCELLANEOUS
Qty: 56 G | Refills: 0 | Status: SHIPPED | OUTPATIENT
Start: 2021-08-18 | End: 2021-09-14

## 2021-08-18 NOTE — TELEPHONE ENCOUNTER
hydrocortisone (CORTAID) 1 % external ointment      Last Written Prescription Date:  4/26/2021  Last Fill Quantity: 56g,   # refills: 4  Last Office Visit: 4/6/2021  Future Office visit:    Next 5 appointments (look out 90 days)    Oct 12, 2021 10:40 AM  PHYSICAL with Janis Marcial MD  [facility] ([facility] ) [address] [place] [address] [phone]           Routing refill request to provider for review/approval because:  Drug not on the [facility], [facility] or [facility] refill protocol or controlled substance    Yissel Mcdonald RN

## 2021-08-28 DIAGNOSIS — E78.5 HYPERLIPIDEMIA LDL GOAL <100: ICD-10-CM

## 2021-08-30 RX ORDER — ATORVASTATIN CALCIUM 80 MG/1
TABLET, FILM COATED ORAL
Qty: 90 TABLET | Refills: 3 | OUTPATIENT
Start: 2021-08-30

## 2021-08-30 NOTE — TELEPHONE ENCOUNTER
Denied Atorvastatin- too soon.   Ordered 10/6/20 90, 3   [employer] for both.     Stephanie DICKSON RN

## 2021-08-31 DIAGNOSIS — I10 ESSENTIAL HYPERTENSION, BENIGN: ICD-10-CM

## 2021-09-01 RX ORDER — METOPROLOL SUCCINATE 100 MG/1
TABLET, EXTENDED RELEASE ORAL
Qty: 90 TABLET | Refills: 0 | Status: SHIPPED | OUTPATIENT
Start: 2021-09-01 | End: 2021-11-05

## 2021-09-01 NOTE — TELEPHONE ENCOUNTER
Prescription approved per [facility] Refill Protocol.  Yissel Mcdonald RN

## 2021-09-10 DIAGNOSIS — E11.8 CONTROLLED TYPE 2 DIABETES MELLITUS WITH COMPLICATION, WITHOUT LONG-TERM CURRENT USE OF INSULIN (H): ICD-10-CM

## 2021-09-12 DIAGNOSIS — I10 HTN, GOAL BELOW 140/90: ICD-10-CM

## 2021-09-12 DIAGNOSIS — L30.9 DERMATITIS: ICD-10-CM

## 2021-09-14 RX ORDER — LANCETS
EACH MISCELLANEOUS
Qty: 100 EACH | Refills: 10 | Status: SHIPPED | OUTPATIENT
Start: 2021-09-14 | End: 2022-10-07

## 2021-09-14 RX ORDER — DIAPER,BRIEF,INFANT-TODD,DISP
EACH MISCELLANEOUS
Qty: 60 G | Refills: 1 | Status: SHIPPED | OUTPATIENT
Start: 2021-09-14

## 2021-09-14 RX ORDER — HYDROCHLOROTHIAZIDE 12.5 MG/1
CAPSULE ORAL
Qty: 90 CAPSULE | Refills: 1 | Status: SHIPPED | OUTPATIENT
Start: 2021-09-14 | End: 2022-02-08

## 2021-09-22 DIAGNOSIS — I10 HTN, GOAL BELOW 140/90: ICD-10-CM

## 2021-09-23 RX ORDER — LISINOPRIL 40 MG/1
TABLET ORAL
Qty: 90 TABLET | Refills: 1 | Status: SHIPPED | OUTPATIENT
Start: 2021-09-23 | End: 2022-03-09

## 2021-09-23 NOTE — TELEPHONE ENCOUNTER
Routing refill request to provider for review/approval because:  Labs out of range:    Creatinine   Date Value Ref Range Status   04/06/2021 1.26 (H) 0.66 - 1.25 mg/dL Final      ACE Inhibitors (Including Combos) Protocol [place]1709/22/2021 03:26 AM   Normal serum creatinine on file in past 12 months     Next 5 appointments (look out 90 days)      Oct 12, 2021 10:40 AM  PHYSICAL with Janis Marcial MD  [facility] ([facility] ) [address] [facility] [address] [phone]          Nat Orlando RN

## 2021-10-04 DIAGNOSIS — E11.8 CONTROLLED TYPE 2 DIABETES MELLITUS WITH COMPLICATION, WITHOUT LONG-TERM CURRENT USE OF INSULIN (H): ICD-10-CM

## 2021-10-04 DIAGNOSIS — F33.42 MAJOR DEPRESSIVE DISORDER, RECURRENT EPISODE, IN FULL REMISSION (H): ICD-10-CM

## 2021-10-06 RX ORDER — PAROXETINE 20 MG/1
TABLET, FILM COATED ORAL
Qty: 90 TABLET | Refills: 0 | Status: SHIPPED | OUTPATIENT
Start: 2021-10-06 | End: 2021-12-21

## 2021-10-06 NOTE — TELEPHONE ENCOUNTER
Medication is being filled for 1 time refill only due to:  Patient needs to be seen because due for diabetic visit, lab and questionnaires.     Next 5 appointments (look out 90 days)    Dec 30, 2021  9:20 AM  PHYSICAL with Janis Marcial MD  [facility] ([facility] ) [address] [facility] [address]  [phone]        Nat Orlando RN

## 2021-10-11 DIAGNOSIS — B35.3 TINEA PEDIS OF BOTH FEET: ICD-10-CM

## 2021-10-11 NOTE — TELEPHONE ENCOUNTER
Clotrimazole 1%       Last Written Prescription Date:  5/18/2021  Last Fill Quantity: 90 g,   # refills: 1  Last Office Visit: 4/06/2021  Future Office visit:    Next 5 appointments (look out 90 days)    Dec 30, 2021  9:20 AM  PHYSICAL with Janis Marcial MD  [facility] ([facility] ) [address] [facility] [address]  [phone]           Routing refill request to provider for review/approval because:  Drug not on the [facility], [facility] or [facility] refill protocol or controlled substance    Stephanie DICKSON RN

## 2021-10-12 RX ORDER — CLOTRIMAZOLE 1 %
CREAM (GRAM) TOPICAL 2 TIMES DAILY
Qty: 90 G | Refills: 1 | Status: SHIPPED | OUTPATIENT
Start: 2021-10-12 | End: 2021-12-01

## 2021-10-23 ENCOUNTER — HEALTH MAINTENANCE LETTER (OUTPATIENT)
Age: 56
End: 2021-10-23

## 2021-10-28 DIAGNOSIS — L30.9 DERMATITIS: ICD-10-CM

## 2021-10-29 RX ORDER — DIAPER,BRIEF,INFANT-TODD,DISP
EACH MISCELLANEOUS
Qty: 60 G | Refills: 0 | OUTPATIENT
Start: 2021-10-29

## 2021-11-05 ENCOUNTER — MYC REFILL (OUTPATIENT)
Dept: FAMILY MEDICINE | Facility: CLINIC | Age: 56
End: 2021-11-05
Payer: MEDICARE

## 2021-11-05 DIAGNOSIS — E11.8 CONTROLLED TYPE 2 DIABETES MELLITUS WITH COMPLICATION, WITHOUT LONG-TERM CURRENT USE OF INSULIN (H): ICD-10-CM

## 2021-11-05 DIAGNOSIS — F33.42 MAJOR DEPRESSIVE DISORDER, RECURRENT EPISODE, IN FULL REMISSION (H): ICD-10-CM

## 2021-11-05 DIAGNOSIS — I10 ESSENTIAL HYPERTENSION, BENIGN: ICD-10-CM

## 2021-11-09 RX ORDER — METOPROLOL SUCCINATE 100 MG/1
100 TABLET, EXTENDED RELEASE ORAL DAILY
Qty: 90 TABLET | Refills: 0 | Status: SHIPPED | OUTPATIENT
Start: 2021-11-09 | End: 2022-03-09

## 2021-11-09 RX ORDER — METOPROLOL SUCCINATE 100 MG/1
TABLET, EXTENDED RELEASE ORAL
Qty: 90 TABLET | Refills: 0 | OUTPATIENT
Start: 2021-11-09

## 2021-11-09 NOTE — TELEPHONE ENCOUNTER
Prescription approved per [facility] Refill Protocol.  Metoprolol     Stephanie DICKSON RN

## 2021-11-09 NOTE — TELEPHONE ENCOUNTER
Denied metoprolol  3 encounters on this.   Ordered in different encounter.     Stephanie DICKSON RN

## 2021-11-10 RX ORDER — PAROXETINE 20 MG/1
20 TABLET, FILM COATED ORAL AT BEDTIME
Qty: 90 TABLET | Refills: 0
Start: 2021-11-10

## 2021-11-22 DIAGNOSIS — E78.5 HYPERLIPIDEMIA LDL GOAL <100: ICD-10-CM

## 2021-11-26 RX ORDER — ATORVASTATIN CALCIUM 80 MG/1
80 TABLET, FILM COATED ORAL DAILY
Qty: 90 TABLET | Refills: 1 | Status: SHIPPED | OUTPATIENT
Start: 2021-11-26 | End: 2022-03-09

## 2021-11-26 NOTE — TELEPHONE ENCOUNTER
Medication refilled per [facility] protocol    Sophia Monique RN

## 2021-11-27 DIAGNOSIS — B35.3 TINEA PEDIS OF BOTH FEET: ICD-10-CM

## 2021-11-30 NOTE — TELEPHONE ENCOUNTER
Routing refill request to provider for review/approval because:  Drug not on the [facility] refill protocol for lotrimin - should pt be out already?

## 2021-12-01 RX ORDER — CLOTRIMAZOLE 1 %
CREAM (GRAM) TOPICAL 2 TIMES DAILY
Qty: 90 G | Refills: 1 | Status: SHIPPED | OUTPATIENT
Start: 2021-12-01 | End: 2022-01-20

## 2021-12-18 ENCOUNTER — HEALTH MAINTENANCE LETTER (OUTPATIENT)
Age: 56
End: 2021-12-18

## 2021-12-18 DIAGNOSIS — E11.8 CONTROLLED TYPE 2 DIABETES MELLITUS WITH COMPLICATION, WITHOUT LONG-TERM CURRENT USE OF INSULIN (H): ICD-10-CM

## 2021-12-18 DIAGNOSIS — F33.42 MAJOR DEPRESSIVE DISORDER, RECURRENT EPISODE, IN FULL REMISSION (H): ICD-10-CM

## 2021-12-21 RX ORDER — PAROXETINE 20 MG/1
TABLET, FILM COATED ORAL
Qty: 90 TABLET | Refills: 0 | Status: SHIPPED | OUTPATIENT
Start: 2021-12-21 | End: 2022-03-09

## 2021-12-21 NOTE — TELEPHONE ENCOUNTER
Routing refill request to provider for review/approval because:  Failed protocol for paxil - due for a phq9, failed for metformin - due for an A1C  - has upcoming appt scheduled

## 2021-12-27 DIAGNOSIS — E78.5 HYPERLIPIDEMIA LDL GOAL <100: ICD-10-CM

## 2021-12-27 RX ORDER — FENOFIBRATE 160 MG/1
TABLET ORAL
Qty: 90 TABLET | Refills: 1 | OUTPATIENT
Start: 2021-12-27

## 2022-01-15 DIAGNOSIS — B35.3 TINEA PEDIS OF BOTH FEET: ICD-10-CM

## 2022-01-20 RX ORDER — CLOTRIMAZOLE 1 %
CREAM (GRAM) TOPICAL 2 TIMES DAILY
Qty: 90 G | Refills: 1 | Status: SHIPPED | OUTPATIENT
Start: 2022-01-20 | End: 2022-05-05

## 2022-02-07 DIAGNOSIS — I10 HTN, GOAL BELOW 140/90: ICD-10-CM

## 2022-02-08 RX ORDER — HYDROCHLOROTHIAZIDE 12.5 MG/1
CAPSULE ORAL
Qty: 90 CAPSULE | Refills: 1 | Status: SHIPPED | OUTPATIENT
Start: 2022-02-08 | End: 2023-02-03

## 2022-02-08 NOTE — TELEPHONE ENCOUNTER
Routing refill request to provider for review/approval because:  Labs out of range:  creatinine    Creatinine   Date Value Ref Range Status   04/06/2021 1.26 (H) 0.66 - 1.25 mg/dL Julius ANDERSON RN

## 2022-02-17 DIAGNOSIS — I10 HTN, GOAL BELOW 140/90: ICD-10-CM

## 2022-02-17 RX ORDER — LISINOPRIL 40 MG/1
TABLET ORAL
Qty: 90 TABLET | Refills: 1
Start: 2022-02-17

## 2022-02-23 DIAGNOSIS — K21.9 GASTROESOPHAGEAL REFLUX DISEASE WITHOUT ESOPHAGITIS: ICD-10-CM

## 2022-02-23 RX ORDER — OMEPRAZOLE 40 MG/1
CAPSULE, DELAYED RELEASE ORAL
Qty: 90 CAPSULE | Refills: 0 | Status: SHIPPED | OUTPATIENT
Start: 2022-02-23 | End: 2022-03-09

## 2022-02-23 NOTE — TELEPHONE ENCOUNTER
Prescription approved per [facility] Refill Protocol.  Yissel Mcdonald RN

## 2022-03-06 ASSESSMENT — ENCOUNTER SYMPTOMS
HEMATURIA: 0
MYALGIAS: 0
CHILLS: 0
PARESTHESIAS: 0
SORE THROAT: 0
DIARRHEA: 0
WEAKNESS: 0
HEADACHES: 0
CONSTIPATION: 0
HEMATOCHEZIA: 0
NAUSEA: 0
JOINT SWELLING: 0
SHORTNESS OF BREATH: 0
PALPITATIONS: 0
EYE PAIN: 0
NERVOUS/ANXIOUS: 0
FEVER: 0
DYSURIA: 0
ABDOMINAL PAIN: 0
FREQUENCY: 0
DIZZINESS: 0
COUGH: 0
HEARTBURN: 0
ARTHRALGIAS: 0

## 2022-03-06 ASSESSMENT — ACTIVITIES OF DAILY LIVING (ADL): CURRENT_FUNCTION: NO ASSISTANCE NEEDED

## 2022-03-07 DIAGNOSIS — F33.42 MAJOR DEPRESSIVE DISORDER, RECURRENT EPISODE, IN FULL REMISSION (H): ICD-10-CM

## 2022-03-07 DIAGNOSIS — E11.8 CONTROLLED TYPE 2 DIABETES MELLITUS WITH COMPLICATION, WITHOUT LONG-TERM CURRENT USE OF INSULIN (H): ICD-10-CM

## 2022-03-09 ENCOUNTER — OFFICE VISIT (OUTPATIENT)
Dept: FAMILY MEDICINE | Facility: CLINIC | Age: 57
End: 2022-03-09
Payer: MEDICARE

## 2022-03-09 VITALS
HEIGHT: 75 IN | BODY MASS INDEX: 28.57 KG/M2 | SYSTOLIC BLOOD PRESSURE: 148 MMHG | HEART RATE: 54 BPM | OXYGEN SATURATION: 100 % | TEMPERATURE: 98 F | DIASTOLIC BLOOD PRESSURE: 72 MMHG | RESPIRATION RATE: 20 BRPM | WEIGHT: 229.8 LBS

## 2022-03-09 DIAGNOSIS — E11.8 CONTROLLED TYPE 2 DIABETES MELLITUS WITH COMPLICATION, WITHOUT LONG-TERM CURRENT USE OF INSULIN (H): ICD-10-CM

## 2022-03-09 DIAGNOSIS — I10 HTN, GOAL BELOW 140/90: ICD-10-CM

## 2022-03-09 DIAGNOSIS — E22.0 ACROMEGALY AND GIGANTISM (H): ICD-10-CM

## 2022-03-09 DIAGNOSIS — Z00.00 ENCOUNTER FOR MEDICARE ANNUAL WELLNESS EXAM: Primary | ICD-10-CM

## 2022-03-09 DIAGNOSIS — K21.9 GASTROESOPHAGEAL REFLUX DISEASE WITHOUT ESOPHAGITIS: ICD-10-CM

## 2022-03-09 DIAGNOSIS — F33.0 MILD EPISODE OF RECURRENT MAJOR DEPRESSIVE DISORDER (H): ICD-10-CM

## 2022-03-09 DIAGNOSIS — I25.10 CORONARY ARTERY DISEASE INVOLVING NATIVE HEART WITHOUT ANGINA PECTORIS, UNSPECIFIED VESSEL OR LESION TYPE: ICD-10-CM

## 2022-03-09 DIAGNOSIS — Z12.5 SCREENING PSA (PROSTATE SPECIFIC ANTIGEN): ICD-10-CM

## 2022-03-09 DIAGNOSIS — E78.5 HYPERLIPIDEMIA LDL GOAL <100: ICD-10-CM

## 2022-03-09 LAB
ERYTHROCYTE [DISTWIDTH] IN BLOOD BY AUTOMATED COUNT: 12.3 % (ref 10–15)
HBA1C MFR BLD: 7.4 % (ref 0–5.6)
HCT VFR BLD AUTO: 37.1 % (ref 40–53)
HGB BLD-MCNC: 12.1 G/DL (ref 13.3–17.7)
MCH RBC QN AUTO: 29.8 PG (ref 26.5–33)
MCHC RBC AUTO-ENTMCNC: 32.6 G/DL (ref 31.5–36.5)
MCV RBC AUTO: 91 FL (ref 78–100)
PLATELET # BLD AUTO: 189 10E3/UL (ref 150–450)
RBC # BLD AUTO: 4.06 10E6/UL (ref 4.4–5.9)
WBC # BLD AUTO: 5.8 10E3/UL (ref 4–11)

## 2022-03-09 PROCEDURE — G0438 PPPS, INITIAL VISIT: HCPCS | Performed by: FAMILY MEDICINE

## 2022-03-09 PROCEDURE — 84443 ASSAY THYROID STIM HORMONE: CPT | Performed by: FAMILY MEDICINE

## 2022-03-09 PROCEDURE — 80053 COMPREHEN METABOLIC PANEL: CPT | Performed by: FAMILY MEDICINE

## 2022-03-09 PROCEDURE — 83036 HEMOGLOBIN GLYCOSYLATED A1C: CPT | Performed by: FAMILY MEDICINE

## 2022-03-09 PROCEDURE — 80061 LIPID PANEL: CPT | Performed by: FAMILY MEDICINE

## 2022-03-09 PROCEDURE — 99207 PR FOOT EXAM NO CHARGE: CPT | Mod: 25 | Performed by: FAMILY MEDICINE

## 2022-03-09 PROCEDURE — 82043 UR ALBUMIN QUANTITATIVE: CPT | Performed by: FAMILY MEDICINE

## 2022-03-09 PROCEDURE — 99214 OFFICE O/P EST MOD 30 MIN: CPT | Mod: 25 | Performed by: FAMILY MEDICINE

## 2022-03-09 PROCEDURE — 36415 COLL VENOUS BLD VENIPUNCTURE: CPT | Performed by: FAMILY MEDICINE

## 2022-03-09 PROCEDURE — 85027 COMPLETE CBC AUTOMATED: CPT | Performed by: FAMILY MEDICINE

## 2022-03-09 PROCEDURE — G0103 PSA SCREENING: HCPCS | Performed by: FAMILY MEDICINE

## 2022-03-09 RX ORDER — PAROXETINE 20 MG/1
TABLET, FILM COATED ORAL
Qty: 90 TABLET | Refills: 0 | OUTPATIENT
Start: 2022-03-09

## 2022-03-09 RX ORDER — LISINOPRIL 40 MG/1
TABLET ORAL
Qty: 90 TABLET | Refills: 1 | Status: SHIPPED | OUTPATIENT
Start: 2022-03-09 | End: 2022-08-04

## 2022-03-09 RX ORDER — OMEPRAZOLE 40 MG/1
40 CAPSULE, DELAYED RELEASE ORAL DAILY
Qty: 90 CAPSULE | Refills: 3 | Status: SHIPPED | OUTPATIENT
Start: 2022-03-09 | End: 2023-02-03

## 2022-03-09 RX ORDER — PAROXETINE 20 MG/1
20 TABLET, FILM COATED ORAL AT BEDTIME
Qty: 90 TABLET | Refills: 1 | Status: SHIPPED | OUTPATIENT
Start: 2022-03-09 | End: 2022-08-04

## 2022-03-09 RX ORDER — HYDROCHLOROTHIAZIDE 12.5 MG/1
CAPSULE ORAL
Qty: 90 CAPSULE | Refills: 1 | Status: CANCELLED | OUTPATIENT
Start: 2022-03-09

## 2022-03-09 RX ORDER — ATORVASTATIN CALCIUM 80 MG/1
80 TABLET, FILM COATED ORAL DAILY
Qty: 90 TABLET | Refills: 3 | Status: SHIPPED | OUTPATIENT
Start: 2022-03-09 | End: 2022-04-25

## 2022-03-09 RX ORDER — SPIRONOLACTONE AND HYDROCHLOROTHIAZIDE 25; 25 MG/1; MG/1
1 TABLET ORAL DAILY
Qty: 90 TABLET | Refills: 1 | Status: SHIPPED | OUTPATIENT
Start: 2022-03-09 | End: 2022-08-30

## 2022-03-09 RX ORDER — FENOFIBRATE 160 MG/1
160 TABLET ORAL DAILY
Qty: 90 TABLET | Refills: 3 | Status: SHIPPED | OUTPATIENT
Start: 2022-03-09 | End: 2023-02-03

## 2022-03-09 RX ORDER — METOPROLOL SUCCINATE 100 MG/1
100 TABLET, EXTENDED RELEASE ORAL DAILY
Qty: 90 TABLET | Refills: 0 | Status: SHIPPED | OUTPATIENT
Start: 2022-03-09 | End: 2022-06-09

## 2022-03-09 ASSESSMENT — ENCOUNTER SYMPTOMS
SHORTNESS OF BREATH: 0
SORE THROAT: 0
MYALGIAS: 0
HEADACHES: 0
NAUSEA: 0
HEARTBURN: 0
DIARRHEA: 0
ABDOMINAL PAIN: 0
CONSTIPATION: 0
PALPITATIONS: 0
DYSURIA: 0
HEMATURIA: 0
NERVOUS/ANXIOUS: 0
COUGH: 0
FEVER: 0
JOINT SWELLING: 0
PARESTHESIAS: 0
HEMATOCHEZIA: 0
FREQUENCY: 0
ARTHRALGIAS: 0
DIZZINESS: 0
WEAKNESS: 0
CHILLS: 0
EYE PAIN: 0

## 2022-03-09 ASSESSMENT — ANXIETY QUESTIONNAIRES
6. BECOMING EASILY ANNOYED OR IRRITABLE: NOT AT ALL
2. NOT BEING ABLE TO STOP OR CONTROL WORRYING: NOT AT ALL
5. BEING SO RESTLESS THAT IT IS HARD TO SIT STILL: NOT AT ALL
3. WORRYING TOO MUCH ABOUT DIFFERENT THINGS: NOT AT ALL
1. FEELING NERVOUS, ANXIOUS, OR ON EDGE: NOT AT ALL
7. FEELING AFRAID AS IF SOMETHING AWFUL MIGHT HAPPEN: NOT AT ALL
GAD7 TOTAL SCORE: 0

## 2022-03-09 ASSESSMENT — PATIENT HEALTH QUESTIONNAIRE - PHQ9
5. POOR APPETITE OR OVEREATING: NOT AT ALL
SUM OF ALL RESPONSES TO PHQ QUESTIONS 1-9: 0

## 2022-03-09 ASSESSMENT — PAIN SCALES - GENERAL: PAINLEVEL: NO PAIN (0)

## 2022-03-09 ASSESSMENT — ACTIVITIES OF DAILY LIVING (ADL): CURRENT_FUNCTION: NO ASSISTANCE NEEDED

## 2022-03-09 NOTE — PROGRESS NOTES
"SUBJECTIVE:   Edd Fong is a 56 year old male who presents for Preventive Visit.      Patient has been advised of split billing requirements and indicates understanding: Yes  Are you in the first 12 months of your Medicare coverage?  No    Healthy Habits:     In general, how would you rate your overall health?  Good    Frequency of exercise:  2-3 days/week    Duration of exercise:  30-45 minutes    Do you usually eat at least 4 servings of fruit and vegetables a day, include whole grains    & fiber and avoid regularly eating high fat or \"junk\" foods?  Yes    Taking medications regularly:  Yes    Medication side effects:  None    Ability to successfully perform activities of daily living:  No assistance needed    Home Safety:  No safety concerns identified    Hearing Impairment:  No hearing concerns    In the past 6 months, have you been bothered by leaking of urine?  No    In general, how would you rate your overall mental or emotional health?  Excellent      PHQ-2 Total Score: 0    Additional concerns today:  No    Do you feel safe in your environment? YES    Have you ever done Advance Care Planning? (For example, a Health Directive, POLST, or a discussion with a medical provider or your loved ones about your wishes): No, advance care planning information given to patient to review.  Patient declined advance care planning discussion at this time.       Fall risk  Fallen 2 or more times in the past year?: No  Any fall with injury in the past year?: No    Cognitive Screening   1) Repeat 3 items (Leader, Season, Table)    2) Clock draw: NORMAL  3) 3 item recall: Recalls 3 objects  Results: 3 items recalled: COGNITIVE IMPAIRMENT LESS LIKELY    Mini-CogTM Copyright MELCHOR Angulo. Licensed by the author for use in [facility]; reprinted with permission (atvon@.[facility]). All rights reserved.      Do you have sleep apnea, excessive snoring or daytime drowsiness?: yes-wears a C-PAP machine     Reviewed and updated " as needed this visit by clinical staff   Tobacco  Allergies  Meds  Problems  Med Hx  Surg Hx  Fam Hx  Soc   Hx      Mood is good but wants to stay on paxil.    gerd-controlled, taking miralax every other day and that helps.    Weight is stable, trying to stop snacking at night, exercise as much as he can, in the house. With better weather, will do something daily.    Lab Results   Component Value Date    A1C 7.2 04/06/2021    A1C 7.0 10/06/2020    A1C 6.5 01/14/2020    A1C 5.9 09/17/2019    A1C 7.2 03/13/2019     152 BS yesterday and 140 the day before, this is his average, checks daily    Patient presents for evaluation of hypertension.  He indicates that he is feeling well and denies any symptoms referable to his elevated blood pressure. Specifically denies chest pain, palpitations, dyspnea, orthopnea, PND or peripheral edema. Current medication regimen is as listed below. Patient denies any side effects of medication.  Taking his meds daily, has cuff at home, but not checking.      Reviewed and updated as needed this visit by Provider                 Social History     Tobacco Use     Smoking status: Never Smoker     Smokeless tobacco: Never Used   Substance Use Topics     Alcohol use: Yes     Alcohol/week: 0.0 standard drinks     Comment: 1 beer a day     If you drink alcohol do you typically have >3 drinks per day or >7 drinks per week? No    Alcohol Use 3/6/2022   Prescreen: >3 drinks/day or >7 drinks/week? No   Prescreen: >3 drinks/day or >7 drinks/week? -   No flowsheet data found.            Current providers sharing in care for this patient include:   Patient Care Team:  Janis Marcial MD as PCP - General (Family Practice)  Janis Marcial MD as Assigned PCP    The following health maintenance items are reviewed in Epic and correct as of today:  Health Maintenance Due   Topic Date Due     HEPATITIS B IMMUNIZATION (1 of 3 - Risk 3-dose series) Never done     A1C  10/06/2021     LIPID  " 10/06/2021     MICROALBUMIN  10/06/2021     ANNUAL REVIEW OF HM ORDERS  10/06/2021     PHQ-9  10/06/2021     CMP  04/06/2022     DIABETIC FOOT EXAM  04/06/2022     BP Readings from Last 3 Encounters:   03/09/22 (!) 148/72   04/06/21 134/74   10/06/20 126/70    Wt Readings from Last 3 Encounters:   03/09/22 104.2 kg (229 lb 12.8 oz)   04/06/21 104.8 kg (231 lb)   10/06/20 104.3 kg (230 lb)                  Recent Labs   Lab Test 03/09/22  1139 04/06/21  1133 10/06/20  0956   A1C 7.4* 7.2* 7.0*   * 113* 88   HDL 36* 37* 38*   TRIG 155* 179* 190*   ALT 31 37 28   CR 1.33* 1.26* 1.19   GFRESTIMATED 62 63 67   GFRESTBLACK  --  73 78   POTASSIUM 4.4 4.5 4.1   TSH 1.81  --  2.27              Review of Systems   Constitutional: Negative for chills and fever.   HENT: Negative for congestion, ear pain, hearing loss and sore throat.    Eyes: Negative for pain and visual disturbance.   Respiratory: Negative for cough and shortness of breath.    Cardiovascular: Negative for chest pain, palpitations and peripheral edema.   Gastrointestinal: Negative for abdominal pain, constipation, diarrhea, heartburn, hematochezia and nausea.   Genitourinary: Negative for dysuria, frequency, genital sores, hematuria, impotence, penile discharge and urgency.   Musculoskeletal: Negative for arthralgias, joint swelling and myalgias.   Skin: Negative for rash.   Neurological: Negative for dizziness, weakness, headaches and paresthesias.   Psychiatric/Behavioral: Negative for mood changes. The patient is not nervous/anxious.          OBJECTIVE:   BP (!) 182/78 (BP Location: Right arm, Patient Position: Sitting, Cuff Size: Adult Large)   Pulse 54   Temp 98  F (36.7  C) (Oral)   Resp 20   Ht 1.911 m (6' 3.25\")   Wt 104.2 kg (229 lb 12.8 oz)   SpO2 100%   BMI 28.53 kg/m   Estimated body mass index is 28.53 kg/m  as calculated from the following:    Height as of this encounter: 1.911 m (6' 3.25\").    Weight as of this encounter: 104.2 kg " (229 lb 12.8 oz).  Physical Exam  GENERAL: healthy, alert and no distress  EYES: Eyes grossly normal to inspection,  and conjunctivae and sclerae normal  HENT: ear canals and TM's normal, nose and mouth without ulcers or lesions  NECK: no adenopathy, no asymmetry, masses, or scars and thyroid normal to palpation  RESP: lungs clear to auscultation - no rales, rhonchi or wheezes  CV: regular rate and rhythm, normal S1 S2, no S3 or S4, no murmur, click or rub, no peripheral edema and peripheral pulses strong  ABDOMEN: soft, nontender, no hepatosplenomegaly, no masses and bowel sounds normal  MS: no gross musculoskeletal defects noted, no edema  SKIN: no suspicious lesions or rashes  NEURO: Normal strength and tone, mentation intact and speech normal  PSYCH: mentation appears normal, affect normal/bright  Diabetic foot exam: normal DP and PT pulses, no trophic changes or ulcerative lesions, normal sensory exam and normal monofilament exam  Small intact Corn on bottom of foot, metatarsal pad area, recommend corn pad    Diagnostic Test Results:  Labs reviewed in Epic    ASSESSMENT / PLAN:   (Z00.00) Encounter for Medicare annual wellness exam  (primary encounter diagnosis)  Comment: normal exam, due for labs, use corn pad for foot, let me know if worsening  Plan: HEMOGLOBIN A1C, Lipid panel reflex to direct         LDL Fasting, Albumin Random Urine Quantitative         with Creat Ratio, COMPREHENSIVE METABOLIC         PANEL, FOOT EXAM, TSH with free T4 reflex, CBC         with platelets            (E78.5) Hyperlipidemia LDL goal <100  Comment: fasting labs  Plan: atorvastatin (LIPITOR) 80 MG tablet,         fenofibrate (TRIGLIDE/LOFIBRA) 160 MG tablet            (I10) HTN, goal below 140/90  Comment: very high, rechecked and still a bit high, recheck in 2 weeks   Plan: lisinopril (ZESTRIL) 40 MG tablet, metoprolol         succinate ER (TOPROL-XL) 100 MG 24 hr tablet,         spironolactone-HCTZ (ALDACTAZIDE) 25-25 MG       "   tablet        Will increase diuretic and change to combo, see above    (E11.8) Controlled type 2 diabetes mellitus with complication, without long-term current use of insulin (H)  Comment: due for labs, has slowly worsening, recommend more exercise  Plan: HEMOGLOBIN A1C, Albumin Random Urine         Quantitative with Creat Ratio, metFORMIN         (GLUCOPHAGE) 500 MG tablet        Weight loss    (I25.10) Coronary artery disease involving native heart without angina pectoris, unspecified vessel or lesion type  Comment: due for cardiology appt  Plan: he will make appt, no sx, but has been 2 yrs    (K21.9) Gastroesophageal reflux disease without esophagitis  Comment: refilled  Plan: omeprazole (PRILOSEC) 40 MG DR capsule            (F33.0) Mild episode of recurrent major depressive disorder (H)  Comment: pt does not want to stop this  Plan: PARoxetine (PAXIL) 20 MG tablet            (E22.0) Acromegaly and gigantism (H)  Comment: stable  Plan:     (Z12.5) Screening PSA (prostate specific antigen)  Comment: agrees  Plan: PSA, screen              Patient has been advised of split billing requirements and indicates understanding: Yes    COUNSELING:  Reviewed preventive health counseling, as reflected in patient instructions       Regular exercise    Estimated body mass index is 28.53 kg/m  as calculated from the following:    Height as of this encounter: 1.911 m (6' 3.25\").    Weight as of this encounter: 104.2 kg (229 lb 12.8 oz).    Weight management plan: Discussed healthy diet and exercise guidelines    He reports that he has never smoked. He has never used smokeless tobacco.      Appropriate preventive services were discussed with this patient, including applicable screening as appropriate for cardiovascular disease, diabetes, osteopenia/osteoporosis, and glaucoma.  As appropriate for age/gender, discussed screening for colorectal cancer, prostate cancer, breast cancer, and cervical cancer. Checklist reviewing " preventive services available has been given to the patient.    Reviewed patients plan of care and provided an AVS. The Complex Care Plan (for patients with higher acuity and needing more deliberate coordination of services) for Edd meets the Care Plan requirement. This Care Plan has been established and reviewed with the Patient.    Counseling Resources:  ATP IV Guidelines  Pooled Cohorts Equation Calculator  Breast Cancer Risk Calculator  Breast Cancer: Medication to Reduce Risk  FRAX Risk Assessment  ICSI Preventive Guidelines  Dietary Guidelines for Americans, 2010  USDA's MyPlate  ASA Prophylaxis  Lung CA Screening    Janis Marcial MD  [employer]    Identified Health Risks:

## 2022-03-09 NOTE — PATIENT INSTRUCTIONS
Patient Education   Personalized Prevention Plan  You are due for the preventive services outlined below.  Your care team is available to assist you in scheduling these services.  If you have already completed any of these items, please share that information with your care team to update in your medical record.  Health Maintenance Due   Topic Date Due     Hepatitis B Vaccine (1 of 3 - Risk 3-dose series) Never done     A1C Lab  10/06/2021     Cholesterol Lab  10/06/2021     Kidney Microalbumin Urine Test  10/06/2021     ANNUAL REVIEW OF HM ORDERS  10/06/2021     Depression Assessment  10/06/2021     Comprehensive Metabolic Panel  04/06/2022     Diabetic Foot Exam  04/06/2022         The Mediterranean Diet can reduce your risk of Heart Disease and Stroke    Recommended:     Olive oil         >4 Tbs/day  Tree nuts       >3 handfuls/wk, prefer once daily 1/4 cup, Almonds, Walnuts, Hazelnuts preferred   Fresh Fruits   >3/day  Vegetables     >2/day  Fish, seafood >3/week  Legumes        >3/week  White meat     Instead of red meat  Wine with meals, optional, only for those who wish to drink, up to 7 drinks per week    Discouraged; Limit the following    Soda drinks                 <1/day  Commercial baked goods, sweets, pastries  <3/week  Spread fats                 <1/day  Red meat                    <1/day  Or processed meats  <1/day     Taken from [employer] [place] [employer] Feb 2013    Follow up with cardiology for recheck

## 2022-03-10 LAB
ALBUMIN SERPL-MCNC: 4 G/DL (ref 3.4–5)
ALP SERPL-CCNC: 51 U/L (ref 40–150)
ALT SERPL W P-5'-P-CCNC: 31 U/L (ref 0–70)
ANION GAP SERPL CALCULATED.3IONS-SCNC: 8 MMOL/L (ref 3–14)
AST SERPL W P-5'-P-CCNC: 17 U/L (ref 0–45)
BILIRUB SERPL-MCNC: 0.6 MG/DL (ref 0.2–1.3)
BUN SERPL-MCNC: 26 MG/DL (ref 7–30)
CALCIUM SERPL-MCNC: 9.5 MG/DL (ref 8.5–10.1)
CHLORIDE BLD-SCNC: 106 MMOL/L (ref 94–109)
CHOLEST SERPL-MCNC: 176 MG/DL
CO2 SERPL-SCNC: 25 MMOL/L (ref 20–32)
CREAT SERPL-MCNC: 1.33 MG/DL (ref 0.66–1.25)
CREAT UR-MCNC: 201 MG/DL
FASTING STATUS PATIENT QL REPORTED: YES
GFR SERPL CREATININE-BSD FRML MDRD: 62 ML/MIN/1.73M2
GLUCOSE BLD-MCNC: 146 MG/DL (ref 70–99)
HDLC SERPL-MCNC: 36 MG/DL
LDLC SERPL CALC-MCNC: 109 MG/DL
MICROALBUMIN UR-MCNC: 19 MG/L
MICROALBUMIN/CREAT UR: 9.45 MG/G CR (ref 0–17)
NONHDLC SERPL-MCNC: 140 MG/DL
POTASSIUM BLD-SCNC: 4.4 MMOL/L (ref 3.4–5.3)
PROT SERPL-MCNC: 6.9 G/DL (ref 6.8–8.8)
PSA SERPL-MCNC: 0.22 UG/L (ref 0–4)
SODIUM SERPL-SCNC: 139 MMOL/L (ref 133–144)
TRIGL SERPL-MCNC: 155 MG/DL
TSH SERPL DL<=0.005 MIU/L-ACNC: 1.81 MU/L (ref 0.4–4)

## 2022-03-10 ASSESSMENT — ANXIETY QUESTIONNAIRES: GAD7 TOTAL SCORE: 0

## 2022-03-31 DIAGNOSIS — E11.8 CONTROLLED TYPE 2 DIABETES MELLITUS WITH COMPLICATION, WITHOUT LONG-TERM CURRENT USE OF INSULIN (H): ICD-10-CM

## 2022-04-01 RX ORDER — BLOOD SUGAR DIAGNOSTIC
STRIP MISCELLANEOUS
Qty: 100 STRIP | Refills: 1 | Status: SHIPPED | OUTPATIENT
Start: 2022-04-01 | End: 2022-10-07

## 2022-04-01 NOTE — TELEPHONE ENCOUNTER
Prescription approved per [facility] Refill Protocol.    Kiera Rmairez RN on 4/1/2022 at 10:43 AM

## 2022-04-20 ENCOUNTER — IMMUNIZATION (OUTPATIENT)
Dept: NURSING | Facility: CLINIC | Age: 57
End: 2022-04-20
Payer: MEDICARE

## 2022-04-20 PROCEDURE — 91305 COVID-19,PF,PFIZER (12+ YRS): CPT

## 2022-04-20 PROCEDURE — 0054A COVID-19,PF,PFIZER (12+ YRS): CPT

## 2022-04-23 DIAGNOSIS — E78.5 HYPERLIPIDEMIA LDL GOAL <100: ICD-10-CM

## 2022-04-25 RX ORDER — ATORVASTATIN CALCIUM 80 MG/1
80 TABLET, FILM COATED ORAL DAILY
Qty: 90 TABLET | Refills: 1 | Status: SHIPPED | OUTPATIENT
Start: 2022-04-25 | End: 2023-02-03

## 2022-05-05 DIAGNOSIS — B35.3 TINEA PEDIS OF BOTH FEET: ICD-10-CM

## 2022-05-05 RX ORDER — CLOTRIMAZOLE 1 %
CREAM (GRAM) TOPICAL
Qty: 90 G | Refills: 1 | Status: SHIPPED | OUTPATIENT
Start: 2022-05-05 | End: 2022-10-11

## 2022-05-17 ENCOUNTER — TELEPHONE (OUTPATIENT)
Dept: FAMILY MEDICINE | Facility: CLINIC | Age: 57
End: 2022-05-17
Payer: MEDICARE

## 2022-05-17 DIAGNOSIS — I25.9 ISCHEMIC HEART DISEASE DUE TO CORONARY ARTERY OBSTRUCTION (H): Primary | ICD-10-CM

## 2022-05-17 DIAGNOSIS — I24.0 ISCHEMIC HEART DISEASE DUE TO CORONARY ARTERY OBSTRUCTION (H): Primary | ICD-10-CM

## 2022-05-17 DIAGNOSIS — Z95.1 S/P CABG (CORONARY ARTERY BYPASS GRAFT): ICD-10-CM

## 2022-05-17 NOTE — TELEPHONE ENCOUNTER
Reason for call:  Other   Patient called regarding (reason for call): call back  Additional comments: Pt has an appointment with a cardiologist at [facility], needs a referral. Please call patient back    Phone number to reach patient:  Home number on file [phone] (home)    Best Time:  any    Can we leave a detailed message on this number?  YES    Travel screening: Not Applicable

## 2022-06-08 DIAGNOSIS — I10 HTN, GOAL BELOW 140/90: ICD-10-CM

## 2022-06-08 NOTE — TELEPHONE ENCOUNTER
Routing refill request to provider for review/approval because:  Labs out of range:  BP  BP Readings from Last 3 Encounters:   03/09/22 (!) 148/72   04/06/21 134/74   10/06/20 126/70     Aldo ANDERSON RN

## 2022-06-09 RX ORDER — METOPROLOL SUCCINATE 100 MG/1
TABLET, EXTENDED RELEASE ORAL
Qty: 90 TABLET | Refills: 0 | Status: SHIPPED | OUTPATIENT
Start: 2022-06-09 | End: 2022-12-07

## 2022-06-24 ENCOUNTER — TELEPHONE (OUTPATIENT)
Dept: FAMILY MEDICINE | Facility: CLINIC | Age: 57
End: 2022-06-24

## 2022-06-24 NOTE — TELEPHONE ENCOUNTER
Reason for call:  Other   Patient called regarding (reason for call): call back  Additional comments: Patient is requesting a referral to see a cardiologist be faxed to [facility]. He would like to be referred to Dr. Renato Gibson, at [facility], Fax: [phone]. Please fax referral.    Phone number to reach patient:  Home number on file [phone] (home)    Best Time:  Asap    Can we leave a detailed message on this number?  YES    Travel screening: Not Applicable

## 2022-07-12 ENCOUNTER — TELEPHONE (OUTPATIENT)
Dept: FAMILY MEDICINE | Facility: CLINIC | Age: 57
End: 2022-07-12

## 2022-07-12 NOTE — TELEPHONE ENCOUNTER
Sister and pt calling. Their mom is sick and they are needing to go to [place] from 7/21/22-12/6/22.   He had appt w/ Dr. Marcial in Sept, but will be unable to make it.     Not sure if you want something done sooner, labs in [place], or be seen when he gets back.    Please advise     Call Wilmer # [phone]  Ok to leave detailed message     Stephanie DICKSON RN

## 2022-08-12 ENCOUNTER — TELEPHONE (OUTPATIENT)
Dept: FAMILY MEDICINE | Facility: CLINIC | Age: 57
End: 2022-08-12

## 2022-08-12 NOTE — TELEPHONE ENCOUNTER
Patient response to BP reading for refill of BP meds.    Patient is out of state for several months. Informed that reported BP reading not in range.    Will take further readings over next week with home monitor and [facility]    Nat Orlando RN

## 2022-08-12 NOTE — TELEPHONE ENCOUNTER
Patient calls to BP readings    8-: 140/79 pulse 53  Nothing additional to report     Best number to call back [phone]

## 2022-08-15 NOTE — TELEPHONE ENCOUNTER
Received call from pt.    /63 taken 8/14.    Pt wondering how often and how long he should be calling in with BP readings.  Routing to Dr Marcial. See also refill encounter from 8/3/22.      Please advise.    Pauly FLORES RN

## 2022-08-16 ENCOUNTER — ALLIED HEALTH/NURSE VISIT (OUTPATIENT)
Dept: FAMILY MEDICINE | Facility: CLINIC | Age: 57
End: 2022-08-16
Payer: MEDICARE

## 2022-08-16 VITALS — SYSTOLIC BLOOD PRESSURE: 126 MMHG | DIASTOLIC BLOOD PRESSURE: 63 MMHG

## 2022-08-16 DIAGNOSIS — I10 HTN (HYPERTENSION): Primary | ICD-10-CM

## 2022-08-16 PROCEDURE — 99207 PR NO CHARGE NURSE ONLY: CPT

## 2022-08-16 NOTE — TELEPHONE ENCOUNTER
COLIN Rodriguez helped me enter self reported vitals in the chart.  A nurse only encounter was opened to enter the vital signs.

## 2022-08-27 DIAGNOSIS — I10 HTN, GOAL BELOW 140/90: ICD-10-CM

## 2022-08-29 NOTE — TELEPHONE ENCOUNTER
Routing refill request to provider for review/approval because:  Labs out of range:  creatinine  Creatinine   Date Value Ref Range Status   03/09/2022 1.33 (H) 0.66 - 1.25 mg/dL Final   04/06/2021 1.26 (H) 0.66 - 1.25 mg/dL Final       Mirella Garcia RN

## 2022-08-30 RX ORDER — SPIRONOLACTONE AND HYDROCHLOROTHIAZIDE 25; 25 MG/1; MG/1
TABLET ORAL
Qty: 90 TABLET | Refills: 1 | Status: SHIPPED | OUTPATIENT
Start: 2022-08-30 | End: 2023-02-03

## 2022-10-06 DIAGNOSIS — E11.8 CONTROLLED TYPE 2 DIABETES MELLITUS WITH COMPLICATION, WITHOUT LONG-TERM CURRENT USE OF INSULIN (H): ICD-10-CM

## 2022-10-07 RX ORDER — LANCETS
EACH MISCELLANEOUS
Qty: 100 EACH | Refills: 10 | Status: SHIPPED | OUTPATIENT
Start: 2022-10-07 | End: 2023-03-20

## 2022-10-07 RX ORDER — BLOOD SUGAR DIAGNOSTIC
STRIP MISCELLANEOUS
Qty: 100 STRIP | Refills: 1 | Status: SHIPPED | OUTPATIENT
Start: 2022-10-07

## 2022-10-07 NOTE — TELEPHONE ENCOUNTER
Prescription approved per [facility] Refill Protocol.  Keyonna Maharaj RN, BSN, PHN  [facility]    Pt has scheduled appointment with PCP in December.

## 2022-10-09 ENCOUNTER — HEALTH MAINTENANCE LETTER (OUTPATIENT)
Age: 57
End: 2022-10-09

## 2022-10-09 DIAGNOSIS — B35.3 TINEA PEDIS OF BOTH FEET: ICD-10-CM

## 2022-10-11 RX ORDER — CLOTRIMAZOLE 1 %
CREAM (GRAM) TOPICAL
Qty: 90 G | Refills: 1 | Status: SHIPPED | OUTPATIENT
Start: 2022-10-11 | End: 2023-03-13

## 2022-10-12 ENCOUNTER — TELEPHONE (OUTPATIENT)
Dept: FAMILY MEDICINE | Facility: CLINIC | Age: 57
End: 2022-10-12

## 2022-10-12 NOTE — TELEPHONE ENCOUNTER
Pt and sister calls, helping pt fill out social security disability forms, already disabled but has to fill out forms every couple of years, will make cardiology appointment now, health appears stable, will not need PCP signature, main question is if pt discussed inability to work at last couple of visits, appears was not discussed as pt did not inquire, no action needed    Anna Wayne RN, BSN  [facility]

## 2022-10-18 ENCOUNTER — TELEPHONE (OUTPATIENT)
Dept: FAMILY MEDICINE | Facility: CLINIC | Age: 57
End: 2022-10-18

## 2022-10-18 NOTE — TELEPHONE ENCOUNTER
Forwarded to Dr Marcial, please review and advise how we can help since pt is out of state currently.    Received call from pt and his sister, Harriet.    Hoping to speak Dr Marcial.  States family is currently in crisis.  Mother was hospitalized in May and is having memory issues.  Pt lived with mother.  Due to these issues, Harriet had pt and their mother come live with her in [place].      Harriet is needing an explanation of pt's disability so Harriet can take care of him. She is planning to bring pt and mom back to [place] in December.    On social security/disability for ASHLEY's syndrome, received a letter from social security/disabilty that she needs to fill out and send in for pt.  Due to mom's memory issues, she is unable to help Harriet fill these forms out.    Questions on the form she needs help with:  From 10/2020 until 10/2022 need dates and reasons for visit. Also, needs Dr Marcial re: has status of pt's disability change at all. Harriet has tried looking in My Chart but is getting confused trying to find answers.    Pauly HOLM RN, BSN  [facility]

## 2022-10-19 NOTE — TELEPHONE ENCOUNTER
Call placed to patient's sister carlitos. Fax number provided to sister to have forms sent for Dr. Marcial to review.    Manohar Solomon RN on 10/19/2022 at 4:54 PM

## 2022-10-19 NOTE — TELEPHONE ENCOUNTER
Patient calling please call sister to let her know you received the forms   Harriet at [phone]      Kalee Junior

## 2022-10-24 ENCOUNTER — TELEPHONE (OUTPATIENT)
Dept: FAMILY MEDICINE | Facility: CLINIC | Age: 57
End: 2022-10-24

## 2022-10-24 NOTE — TELEPHONE ENCOUNTER
Patient's sister calling follow-up on the forms from last week (see telephone encounter from 10/18). Confirmed that the forms are in PCP in basket, to be addressed when provider in clinic.     Sister advises of fax number if able to send to them as they are out of state at the moment.     Fax number - [phone]    Routed to PCP for further review.    Manohar Solomon RN on 10/24/2022 at 8:48 AM

## 2022-11-01 NOTE — TELEPHONE ENCOUNTER
This is duplicate encounter, addressed in original encounter, forms faxed 10/27    Brianna Sellers

## 2022-12-05 DIAGNOSIS — I10 HTN, GOAL BELOW 140/90: ICD-10-CM

## 2022-12-07 RX ORDER — METOPROLOL SUCCINATE 100 MG/1
TABLET, EXTENDED RELEASE ORAL
Qty: 90 TABLET | Refills: 0 | Status: SHIPPED | OUTPATIENT
Start: 2022-12-07 | End: 2022-12-16

## 2022-12-16 DIAGNOSIS — I10 HTN, GOAL BELOW 140/90: ICD-10-CM

## 2022-12-16 RX ORDER — METOPROLOL SUCCINATE 100 MG/1
100 TABLET, EXTENDED RELEASE ORAL DAILY
Qty: 7 TABLET | Refills: 0 | Status: SHIPPED | OUTPATIENT
Start: 2022-12-16 | End: 2023-02-03

## 2022-12-16 NOTE — TELEPHONE ENCOUNTER
vEa calls on behalf of the patient. She asks for a weeks worth of meds as his mail order pharmacy is taking too long.

## 2022-12-16 NOTE — TELEPHONE ENCOUNTER
Prescription approved per [facility] Refill Protocol.    Jenifer PIMENTEL RN, BSN, PHN  [facility]

## 2022-12-28 DIAGNOSIS — E11.8 CONTROLLED TYPE 2 DIABETES MELLITUS WITH COMPLICATION, WITHOUT LONG-TERM CURRENT USE OF INSULIN (H): ICD-10-CM

## 2022-12-28 DIAGNOSIS — I10 HTN, GOAL BELOW 140/90: ICD-10-CM

## 2022-12-28 DIAGNOSIS — E78.5 HYPERLIPIDEMIA LDL GOAL <100: ICD-10-CM

## 2022-12-28 DIAGNOSIS — F33.0 MILD EPISODE OF RECURRENT MAJOR DEPRESSIVE DISORDER (H): ICD-10-CM

## 2022-12-28 RX ORDER — PAROXETINE 20 MG/1
TABLET, FILM COATED ORAL
Qty: 90 TABLET | Refills: 1 | OUTPATIENT
Start: 2022-12-28

## 2022-12-28 RX ORDER — FENOFIBRATE 160 MG/1
TABLET ORAL
Qty: 90 TABLET | Refills: 3 | OUTPATIENT
Start: 2022-12-28

## 2022-12-28 RX ORDER — LISINOPRIL 40 MG/1
TABLET ORAL
Qty: 90 TABLET | Refills: 1 | OUTPATIENT
Start: 2022-12-28

## 2023-02-03 ENCOUNTER — OFFICE VISIT (OUTPATIENT)
Dept: FAMILY MEDICINE | Facility: CLINIC | Age: 58
End: 2023-02-03
Payer: MEDICARE

## 2023-02-03 VITALS
DIASTOLIC BLOOD PRESSURE: 82 MMHG | TEMPERATURE: 97.6 F | HEIGHT: 76 IN | OXYGEN SATURATION: 98 % | BODY MASS INDEX: 23.47 KG/M2 | HEART RATE: 48 BPM | SYSTOLIC BLOOD PRESSURE: 124 MMHG | RESPIRATION RATE: 16 BRPM | WEIGHT: 192.7 LBS

## 2023-02-03 DIAGNOSIS — I24.0 ISCHEMIC HEART DISEASE DUE TO CORONARY ARTERY OBSTRUCTION (H): ICD-10-CM

## 2023-02-03 DIAGNOSIS — F33.0 MILD EPISODE OF RECURRENT MAJOR DEPRESSIVE DISORDER (H): ICD-10-CM

## 2023-02-03 DIAGNOSIS — D64.9 LOW HEMOGLOBIN: ICD-10-CM

## 2023-02-03 DIAGNOSIS — E11.9 CONTROLLED TYPE 2 DIABETES MELLITUS WITHOUT COMPLICATION, WITHOUT LONG-TERM CURRENT USE OF INSULIN (H): Primary | ICD-10-CM

## 2023-02-03 DIAGNOSIS — K21.9 GASTROESOPHAGEAL REFLUX DISEASE WITHOUT ESOPHAGITIS: ICD-10-CM

## 2023-02-03 DIAGNOSIS — I25.9 ISCHEMIC HEART DISEASE DUE TO CORONARY ARTERY OBSTRUCTION (H): ICD-10-CM

## 2023-02-03 DIAGNOSIS — I10 HTN, GOAL BELOW 140/90: ICD-10-CM

## 2023-02-03 LAB
ANION GAP SERPL CALCULATED.3IONS-SCNC: 13 MMOL/L (ref 7–15)
BUN SERPL-MCNC: 29.9 MG/DL (ref 8–23)
CALCIUM SERPL-MCNC: 9.8 MG/DL (ref 8.6–10)
CHLORIDE SERPL-SCNC: 106 MMOL/L (ref 98–107)
CREAT SERPL-MCNC: 1.42 MG/DL (ref 0.67–1.17)
CREAT UR-MCNC: 161 MG/DL
DEPRECATED HCO3 PLAS-SCNC: 22 MMOL/L (ref 22–29)
ERYTHROCYTE [DISTWIDTH] IN BLOOD BY AUTOMATED COUNT: 11.9 % (ref 10–15)
FERRITIN SERPL-MCNC: 148 NG/ML (ref 31–409)
GFR SERPL CREATININE-BSD FRML MDRD: 57 ML/MIN/1.73M2
GLUCOSE SERPL-MCNC: 95 MG/DL (ref 70–99)
HBA1C MFR BLD: 6.3 % (ref 0–5.6)
HCT VFR BLD AUTO: 37.8 % (ref 40–53)
HGB BLD-MCNC: 11.8 G/DL (ref 13.3–17.7)
IRON BINDING CAPACITY (ROCHE): 351 UG/DL (ref 240–430)
IRON SATN MFR SERPL: 29 % (ref 15–46)
IRON SERPL-MCNC: 101 UG/DL (ref 61–157)
MCH RBC QN AUTO: 30.2 PG (ref 26.5–33)
MCHC RBC AUTO-ENTMCNC: 31.2 G/DL (ref 31.5–36.5)
MCV RBC AUTO: 97 FL (ref 78–100)
MICROALBUMIN UR-MCNC: <12 MG/L
MICROALBUMIN/CREAT UR: NORMAL MG/G{CREAT}
PLATELET # BLD AUTO: 212 10E3/UL (ref 150–450)
POTASSIUM SERPL-SCNC: 4.9 MMOL/L (ref 3.4–5.3)
RBC # BLD AUTO: 3.91 10E6/UL (ref 4.4–5.9)
SODIUM SERPL-SCNC: 141 MMOL/L (ref 136–145)
WBC # BLD AUTO: 5.7 10E3/UL (ref 4–11)

## 2023-02-03 PROCEDURE — 82728 ASSAY OF FERRITIN: CPT | Performed by: NURSE PRACTITIONER

## 2023-02-03 PROCEDURE — 80048 BASIC METABOLIC PNL TOTAL CA: CPT | Performed by: NURSE PRACTITIONER

## 2023-02-03 PROCEDURE — 83550 IRON BINDING TEST: CPT | Performed by: NURSE PRACTITIONER

## 2023-02-03 PROCEDURE — 82570 ASSAY OF URINE CREATININE: CPT | Performed by: NURSE PRACTITIONER

## 2023-02-03 PROCEDURE — 85027 COMPLETE CBC AUTOMATED: CPT | Performed by: NURSE PRACTITIONER

## 2023-02-03 PROCEDURE — 83540 ASSAY OF IRON: CPT | Performed by: NURSE PRACTITIONER

## 2023-02-03 PROCEDURE — 82043 UR ALBUMIN QUANTITATIVE: CPT | Performed by: NURSE PRACTITIONER

## 2023-02-03 PROCEDURE — 83036 HEMOGLOBIN GLYCOSYLATED A1C: CPT | Performed by: NURSE PRACTITIONER

## 2023-02-03 PROCEDURE — 36415 COLL VENOUS BLD VENIPUNCTURE: CPT | Performed by: NURSE PRACTITIONER

## 2023-02-03 PROCEDURE — 99214 OFFICE O/P EST MOD 30 MIN: CPT | Performed by: NURSE PRACTITIONER

## 2023-02-03 RX ORDER — PAROXETINE 20 MG/1
20 TABLET, FILM COATED ORAL AT BEDTIME
Qty: 90 TABLET | Refills: 1 | Status: SHIPPED | OUTPATIENT
Start: 2023-02-03 | End: 2023-07-11

## 2023-02-03 RX ORDER — SPIRONOLACTONE AND HYDROCHLOROTHIAZIDE 25; 25 MG/1; MG/1
1 TABLET ORAL DAILY
Qty: 90 TABLET | Refills: 1 | Status: SHIPPED | OUTPATIENT
Start: 2023-02-03 | End: 2023-07-18

## 2023-02-03 RX ORDER — LISINOPRIL 40 MG/1
TABLET ORAL
Qty: 90 TABLET | Refills: 1 | Status: SHIPPED | OUTPATIENT
Start: 2023-02-03 | End: 2023-07-11

## 2023-02-03 RX ORDER — ROSUVASTATIN CALCIUM 40 MG/1
TABLET, COATED ORAL
COMMUNITY
Start: 2023-01-31

## 2023-02-03 RX ORDER — OMEPRAZOLE 40 MG/1
40 CAPSULE, DELAYED RELEASE ORAL DAILY
Qty: 90 CAPSULE | Refills: 3 | Status: SHIPPED | OUTPATIENT
Start: 2023-02-03 | End: 2024-02-07

## 2023-02-03 ASSESSMENT — PAIN SCALES - GENERAL: PAINLEVEL: NO PAIN (0)

## 2023-02-03 NOTE — PROGRESS NOTES
Assessment & Plan     Controlled type 2 diabetes mellitus without complication, without long-term current use of insulin (H)  Chronic stable; no changes, continue current regimen; pend labs.     - HEMOGLOBIN A1C; Future  - Albumin Random Urine Quantitative with Creat Ratio; Future  - metFORMIN (GLUCOPHAGE) 500 MG tablet; Take 1 tablet (500 mg) by mouth 2 times daily (with meals)    Ischemic heart disease due to coronary artery obstruction (H)  Chronic stable; no changes, continue current regimen; routine labs.     - CBC with platelets; Future  - Basic metabolic panel  (Ca, Cl, CO2, Creat, Gluc, K, Na, BUN); Future      HTN, goal below 140/90  Chronic stable; no changes, continue current regimen.     - spironolactone-HCTZ (ALDACTAZIDE) 25-25 MG tablet; Take 1 tablet by mouth daily  - lisinopril (ZESTRIL) 40 MG tablet; TAKE 1 TABLET EVERY DAY    Mild episode of recurrent major depressive disorder (H)  Chronic stable; no changes, continue current regimen.     - PARoxetine (PAXIL) 20 MG tablet; Take 1 tablet (20 mg) by mouth At Bedtime    Gastroesophageal reflux disease without esophagitis  Chronic stable; no changes, continue current regimen.     - omeprazole (PRILOSEC) 40 MG DR capsule; Take 1 capsule (40 mg) by mouth daily      Return in about 6 months (around 8/3/2023) for Routine preventive.    MANAV Cerrato CNP [facility] GLEN Guillen is a 57 year old accompanied by his sister, presenting for the following health issues:  Hypertension      History of Present Illness       Hypertension: He presents for follow up of hypertension.  He does check blood pressure  regularly outside of the clinic. Outpatient blood pressures have not been over 140/90. He follows a low salt diet.      Today's PHQ-9         PHQ-9 Total Score: 0    PHQ-9 Q9 Thoughts of better off dead/self-harm past 2 weeks :   Not at all    How difficult have these problems made it for you to do your work, take care  "of things at home, or get along with other people: Not difficult at all       Medication Followup of medications    Taking Medication as prescribed: yes    Side Effects:  None    Medication Helping Symptoms:  NO      Patient presents to clinic for routine bp and bg follow up.    Patient typically lives with mother; mom has recently had health concerns therefore has been living with his sister from [place] for the past 5-6 months. Sister notes decrease in weight states this has been purposeful since she started caring for him to better manage his bg. Patient states he is feeling well and denies any concerns today. Sister plans to move patient to [place] once getting their mother settled.     Patient denies any concerns with his paxil dosing; states his moods have been stable and would like to continue with current dosing.     Wt Readings from Last 5 Encounters:   02/03/23 87.4 kg (192 lb 11.2 oz)   03/09/22 104.2 kg (229 lb 12.8 oz)   04/06/21 104.8 kg (231 lb)   10/06/20 104.3 kg (230 lb)   04/08/20 103 kg (227 lb)     Review of Systems   Constitutional, HEENT, cardiovascular, pulmonary, gi and gu systems are negative, except as otherwise noted.      Objective    /82 (BP Location: Right arm, Patient Position: Sitting, Cuff Size: Adult Regular)   Pulse (!) 48   Temp 97.6  F (36.4  C) (Oral)   Resp 16   Ht 1.93 m (6' 4\")   Wt 87.4 kg (192 lb 11.2 oz)   SpO2 98%   BMI 23.46 kg/m    Body mass index is 23.46 kg/m .  Physical Exam   GENERAL: healthy, alert and no distress  NECK: no adenopathy, no asymmetry, masses, or scars and thyroid normal to palpation  RESP: lungs clear to auscultation - no rales, rhonchi or wheezes  CV: regular rate and rhythm, normal S1 S2, no S3 or S4, no murmur, click or rub, no peripheral edema and peripheral pulses strong  MS: no gross musculoskeletal defects noted, no edema                "

## 2023-03-13 DIAGNOSIS — B35.3 TINEA PEDIS OF BOTH FEET: ICD-10-CM

## 2023-03-13 RX ORDER — CLOTRIMAZOLE 1 %
CREAM (GRAM) TOPICAL
Qty: 90 G | Refills: 1 | Status: SHIPPED | OUTPATIENT
Start: 2023-03-13

## 2023-03-17 DIAGNOSIS — E11.9 TYPE 2 DIABETES, HBA1C GOAL < 8% (H): ICD-10-CM

## 2023-03-17 DIAGNOSIS — E11.8 CONTROLLED TYPE 2 DIABETES MELLITUS WITH COMPLICATION, WITHOUT LONG-TERM CURRENT USE OF INSULIN (H): ICD-10-CM

## 2023-03-20 RX ORDER — LANCETS
EACH MISCELLANEOUS
Qty: 100 EACH | Refills: 5 | Status: SHIPPED | OUTPATIENT
Start: 2023-03-20

## 2023-03-20 NOTE — TELEPHONE ENCOUNTER
Prescription approved per [facility] Refill Protocol.  Anna Wayne RN, BSN  [facility]

## 2023-05-21 ENCOUNTER — HEALTH MAINTENANCE LETTER (OUTPATIENT)
Age: 58
End: 2023-05-21

## 2023-08-19 ENCOUNTER — HEALTH MAINTENANCE LETTER (OUTPATIENT)
Age: 58
End: 2023-08-19

## 2023-08-29 ENCOUNTER — TELEPHONE (OUTPATIENT)
Dept: FAMILY MEDICINE | Facility: CLINIC | Age: 58
End: 2023-08-29

## 2023-08-29 NOTE — LETTER
[facility]  [address] [address] [address]  [phone]       September 12, 2023    Edd Fong  [address] [place] [address]    Dear Wilmer,    We care about your health and have reviewed your health plan and are making recommendations based on this review, to optimize your health.    You are in particular need of attention regarding:  -Diabetes  -Wellness (Physical) Visit     We are recommending that you:  -schedule a WELLNESS (Physical) APPOINTMENT with me.   I will check fasting labs the same day - nothing to eat except water and meds for 8-10 hours prior.    -Diabetic Eye Exam is due.  If this was done within the last 12 months then please contact the clinic with the date and location so we can update your records.    In addition, here is a list of due or overdue Health Maintenance reminders.    Health Maintenance Due   Topic Date Due    Pneumococcal Vaccine (2 - PCV) 09/19/2015    COVID-19 Vaccine (5 - Pfizer series) 06/15/2022    Cholesterol Lab  09/09/2022    Annual Wellness Visit  03/09/2023    Comprehensive Metabolic Panel  03/09/2023    Diabetic Foot Exam  03/09/2023    ANNUAL REVIEW OF HM ORDERS  03/09/2023    Eye Exam  07/06/2023    A1C Lab  08/03/2023    Depression Assessment  08/03/2023    Flu Vaccine (1) 09/01/2023       To address the above recommendations, we encourage you to contact us at [phone], via [employer] or by contacting Central Scheduling toll free at [phone] 24 hours a day. They will assist you with finding the most convenient time and location.    Thank you for trusting [facility] and we appreciate the opportunity to serve you.  We look forward to supporting your healthcare needs in the future.    Healthy Regards,    Your [facility] Team

## 2023-08-29 NOTE — LETTER
[facility]  [address] [address] [id number] [phone]       December 11, 2023    Edd Fong  [address] [place] [address]    Dear Wilmer,    We care about your health and have reviewed your health plan and are making recommendations based on this review, to optimize your health.    You are in particular need of attention regarding:  -Diabetes  -Wellness (Physical) Visit     We are recommending that you:  -schedule a WELLNESS (Physical) APPOINTMENT with me.   I will check fasting labs the same day - nothing to eat except water and meds for 8-10 hours prior.      -Diabetic Eye Exam is due.  If this was done within the last 12 months then please contact the clinic with the date and location so we can update your records.    In addition, here is a list of due or overdue Health Maintenance reminders.    Health Maintenance Due   Topic Date Due    Pneumococcal Vaccine (2 - PCV) 09/19/2015    Cholesterol Lab  09/09/2022    Annual Wellness Visit  03/09/2023    Comprehensive Metabolic Panel  03/09/2023    Diabetic Foot Exam  03/09/2023    ANNUAL REVIEW OF HM ORDERS  03/09/2023    Eye Exam  07/06/2023    RSV VACCINE (Pregnancy & 60+) (1 - 1-dose 60+ series) Never done    A1C Lab  08/03/2023    Depression Assessment  08/03/2023    Flu Vaccine (1) 09/01/2023    COVID-19 Vaccine (5 - 2023-24 season) 09/01/2023       To address the above recommendations, we encourage you to contact us at [phone], via [employer] or by contacting Central Scheduling toll free at [phone] 24 hours a day. They will assist you with finding the most convenient time and location.    Thank you for trusting [facility] and we appreciate the opportunity to serve you.  We look forward to supporting your healthcare needs in the future.    Healthy Regards,    Your [facility] Team

## 2023-08-29 NOTE — CONFIDENTIAL NOTE
Patient Quality Outreach    Patient is due for the following:   Diabetes -  Eye Exam  Depression  -  PHQ-9 needed  Physical Preventive Adult Physical    Next Steps:   Schedule a Adult Preventative    Type of outreach:    Sent [employer] message.      Questions for provider review:    None           Dileep Sinclair MA

## 2023-09-12 NOTE — CONFIDENTIAL NOTE
Patient Quality Outreach    Patient is due for the following:   Diabetes -  Eye Exam  Physical Preventive Adult Physical    Next Steps:   Schedule a Adult Preventative    Type of outreach:    Sent letter.    Next Steps:  Reach out within 90 days via Letter.    Max number of attempts reached: No. Will try again in 90 days if patient still on fail list.    Questions for provider review:    None           Dileep Sinclair MA

## 2023-10-02 DIAGNOSIS — I10 HTN, GOAL BELOW 140/90: ICD-10-CM

## 2023-10-02 RX ORDER — METOPROLOL SUCCINATE 100 MG/1
100 TABLET, EXTENDED RELEASE ORAL DAILY
Qty: 90 TABLET | Refills: 0 | Status: SHIPPED | OUTPATIENT
Start: 2023-10-02

## 2023-10-16 NOTE — TELEPHONE ENCOUNTER
LVM requesting a call back for an appt. One more attempt will be made.     Karley Deluna  Lead     
Please CALL to schedule annual exam.  Not reading mychart.  Jane Araujo RN    
Pt moved out of state and called previously to advise     Niki Coppola     
Sent [employer] message requesting a call back for an appt. Two more attempts will be made.    Karley Deluna  Lead     
64
84

## 2023-12-11 NOTE — CONFIDENTIAL NOTE
Patient Quality Outreach    Patient is due for the following:   Diabetes -  Eye Exam  Physical Preventive Adult Physical    Next Steps:   Schedule a Adult Preventative    Type of outreach:    Sent letter.      Questions for provider review:    None           Dileep Sinclair MA

## 2024-02-07 DIAGNOSIS — K21.9 GASTROESOPHAGEAL REFLUX DISEASE WITHOUT ESOPHAGITIS: ICD-10-CM

## 2024-02-07 RX ORDER — OMEPRAZOLE 40 MG/1
40 CAPSULE, DELAYED RELEASE ORAL DAILY
Qty: 90 CAPSULE | Refills: 0 | Status: SHIPPED | OUTPATIENT
Start: 2024-02-07

## 2024-02-07 NOTE — TELEPHONE ENCOUNTER
Terrie refill sent. Needs appointment, note sent to pharmacy to inform patient per protocol.  Aldo ANDERSON RN 2/7/2024 at 7:51 AM

## 2024-03-16 ENCOUNTER — HEALTH MAINTENANCE LETTER (OUTPATIENT)
Age: 59
End: 2024-03-16

## 2024-05-06 DIAGNOSIS — I10 HTN, GOAL BELOW 140/90: ICD-10-CM

## 2024-05-06 RX ORDER — METOPROLOL SUCCINATE 100 MG/1
TABLET, EXTENDED RELEASE ORAL
Qty: 90 TABLET | Refills: 3 | OUTPATIENT
Start: 2024-05-06

## 2024-05-06 NOTE — TELEPHONE ENCOUNTER
It looks like patient has moved to [place]. Can we call him to verify this is the case and if he requires refills? Has he established care etc.? If he needs chauncey refill will provide if necessary to find a new PCP etc.     MANAV Kamara CNP (For Dr. Marcial)

## 2024-05-06 NOTE — TELEPHONE ENCOUNTER
Patient has moved to [place]. He has a new PCP and new cardiologist who he will reach out to for future refills on his medications.    Kailee Jain RN

## 2024-05-25 ENCOUNTER — HEALTH MAINTENANCE LETTER (OUTPATIENT)
Age: 59
End: 2024-05-25

## 2024-10-12 ENCOUNTER — HEALTH MAINTENANCE LETTER (OUTPATIENT)
Age: 59
End: 2024-10-12

## 2025-05-03 ENCOUNTER — HEALTH MAINTENANCE LETTER (OUTPATIENT)
Age: 60
End: 2025-05-03

## 2025-06-14 ENCOUNTER — HEALTH MAINTENANCE LETTER (OUTPATIENT)
Age: 60
End: 2025-06-14

## (undated) DEVICE — ENDO FORCEP BX CAPTURA JUMBO SPIKE 2.8MMX230CM G53042

## (undated) DEVICE — KIT ENDO TURNOVER/PROCEDURE W/CLEAN A SCOPE LINERS 103888

## (undated) RX ORDER — FENTANYL CITRATE 50 UG/ML
INJECTION, SOLUTION INTRAMUSCULAR; INTRAVENOUS
Status: DISPENSED
Start: 2018-09-12